# Patient Record
Sex: FEMALE | Race: BLACK OR AFRICAN AMERICAN | Employment: FULL TIME | URBAN - METROPOLITAN AREA
[De-identification: names, ages, dates, MRNs, and addresses within clinical notes are randomized per-mention and may not be internally consistent; named-entity substitution may affect disease eponyms.]

---

## 2017-07-14 ENCOUNTER — HOSPITAL ENCOUNTER (OUTPATIENT)
Dept: VASCULAR LAB | Age: 52
Discharge: HOME | End: 2017-07-14
Attending: EMERGENCY MEDICINE | Admitting: EMERGENCY MEDICINE

## 2017-07-15 PROBLEM — A41.9 SEPSIS (HCC): Status: ACTIVE | Noted: 2017-07-15

## 2018-09-13 ENCOUNTER — OFFICE VISIT (OUTPATIENT)
Dept: ORTHOPEDIC SURGERY | Age: 53
End: 2018-09-13

## 2018-09-13 VITALS
WEIGHT: 199 LBS | HEIGHT: 68 IN | SYSTOLIC BLOOD PRESSURE: 126 MMHG | DIASTOLIC BLOOD PRESSURE: 79 MMHG | HEART RATE: 65 BPM | RESPIRATION RATE: 16 BRPM | BODY MASS INDEX: 30.16 KG/M2 | TEMPERATURE: 98.2 F

## 2018-09-13 DIAGNOSIS — M25.561 ACUTE PAIN OF RIGHT KNEE: ICD-10-CM

## 2018-09-13 DIAGNOSIS — Z96.651 STATUS POST TOTAL RIGHT KNEE REPLACEMENT: Primary | ICD-10-CM

## 2018-09-13 PROCEDURE — 99213 OFFICE O/P EST LOW 20 MIN: CPT | Performed by: ORTHOPAEDIC SURGERY

## 2018-09-13 RX ORDER — METHYLPREDNISOLONE 4 MG/1
TABLET ORAL
Qty: 1 KIT | Refills: 0 | Status: SHIPPED | OUTPATIENT
Start: 2018-09-13 | End: 2018-09-19

## 2018-09-13 NOTE — PROGRESS NOTES
This dictation was done with [employer] dictation and may contain mechanical errors related to translation. Blood pressure 126/79, pulse 65, temperature 98.2 °F (36.8 °C), temperature source Temporal, resp. rate 16, height 5' 8\" (1.727 m), weight 199 lb (90.3 kg), not currently breastfeeding.

## 2018-09-17 NOTE — PROGRESS NOTES
Patient is a 48-year-old female who is now over 3 years postop right total knee arthroplasty. This patient had an uneventful postoperative recovery and at one point time states she was doing well without any significant issues. Recently she is complaining of increasing pain with weightbearing. She has no new history of injury. She has been using anti-inflammatories along with the oral narcotic agents for pain relief. She's here for further evaluation and follow-up. Patient Active Problem List   Diagnosis    Primary localized osteoarthrosis, lower leg    Status post total right knee replacement-8.19.2015-Dr. Marianna Santoyo    Edema    Excessive and frequent menstruation    Hyperlipidemia    Hypertrophy of uterus    Insomnia    Gonadoblastoma, female    Adynamic ileus ([facility] [address]    Other specified noninflammatory disorder of vagina    Sepsis ([facility] [address]     Prior to Visit Medications    Medication Sig Taking? Authorizing Provider   methylPREDNISolone (MEDROL, YONATHAN,) 4 MG tablet Pain, radicular Yes Cindy Monroy MD   ibuprofen (ADVIL;MOTRIN) 800 MG tablet Take 1 tablet by mouth every 8 hours as needed for Pain  Jeanie Nevarez PA-C   melatonin 3 MG TABS tablet Take 3 mg by mouth nightly  Historical Provider, MD   LORazepam (ATIVAN) 0.5 MG tablet Take 0.5 mg by mouth daily as needed for Anxiety  Historical Provider, MD   traMADol (ULTRAM) 50 MG tablet Take 1 tablet by mouth 2 times daily  Cindy Monroy MD   Compression Stockings MISC by Does not apply route Knee high 20-30 mmHg  Cindy Monroy MD   albuterol (VENTOLIN HFA) 108 (90 BASE) MCG/ACT inhaler Inhale 2 puffs into the lungs every 6 hours as needed for Wheezing  Historical Provider, MD     Physical examination 48-year-old female oriented ×3 temperature is 98.2. Examination of her back shows good range of motion no specific pain tenderness or instability.   Motor exam shows quadriceps hamstrings hip abductors and abductors foot plantar dorsiflexors are intact 4-5 over 5 to the right lower extremity. Sensation and perfusion are intact to the right foot and ankle. No other obvious cutaneous lesions lymphedema or cellulitic processes are noted in the right lower extremity. Examination of the right knee shows well-healed anterior scar full extension 130° of flexion with no obvious signs of instability or deep sepsis. Patient states that she has a knock knee deformity and on measuring of the eventual x-rays she had 5° of valgus measured on x-rays of her right total knee arthroplasty. X-rays obtained AP lateral patellofemoral view of the right knee show status post cemented right total knee arthroplasty. Stable overall orientation and alignment with no signs of acute failure or loosening. Good patellofemoral mechanics are noted and no other obvious fractures tumors or dislocations are seen on these x-rays. Impression 70-year-old female now over 3 years postop total knee arthroplasty. Patient is complaining of increasing pain with some swelling and at this point time we don't see any surgical indication. We would suggest continued conservative management. Plan we had a 15 minute face-to-face discussion with this patient of which greater than 50% time was spent on counseling her concerning her right knee arthroplasty. We suggest one a steroid Dosepak to see if we can't reduce some of the inflammation to physical therapy for strength increase in range of motion exercises. We did discuss with the patient her 5° of valgus that some seen both radiographically and on examination. We will follow her up in 4-6 weeks or p.r.n.

## 2018-11-26 ENCOUNTER — OFFICE VISIT (OUTPATIENT)
Dept: ORTHOPEDIC SURGERY | Age: 53
End: 2018-11-26
Payer: COMMERCIAL

## 2018-11-26 VITALS
BODY MASS INDEX: 30.01 KG/M2 | HEIGHT: 68 IN | DIASTOLIC BLOOD PRESSURE: 84 MMHG | HEART RATE: 77 BPM | WEIGHT: 198 LBS | SYSTOLIC BLOOD PRESSURE: 139 MMHG

## 2018-11-26 DIAGNOSIS — Z96.651 HISTORY OF TOTAL KNEE ARTHROPLASTY, RIGHT: Primary | ICD-10-CM

## 2018-11-26 PROCEDURE — 99203 OFFICE O/P NEW LOW 30 MIN: CPT | Performed by: ORTHOPAEDIC SURGERY

## 2019-05-08 ENCOUNTER — HOSPITAL ENCOUNTER (EMERGENCY)
Age: 54
Discharge: HOME | End: 2019-05-08
Payer: COMMERCIAL

## 2019-05-08 VITALS
HEART RATE: 69 BPM | OXYGEN SATURATION: 99 % | TEMPERATURE: 97.9 F | WEIGHT: 202.16 LBS | BODY MASS INDEX: 30.74 KG/M2 | SYSTOLIC BLOOD PRESSURE: 138 MMHG | DIASTOLIC BLOOD PRESSURE: 99 MMHG | RESPIRATION RATE: 16 BRPM

## 2019-05-08 DIAGNOSIS — S39.012A STRAIN OF LUMBAR REGION, INITIAL ENCOUNTER: Primary | ICD-10-CM

## 2019-05-08 PROCEDURE — 99283 EMERGENCY DEPT VISIT LOW MDM: CPT

## 2019-05-08 RX ORDER — IBUPROFEN 800 MG/1
800 TABLET ORAL EVERY 8 HOURS PRN
Qty: 20 TABLET | Refills: 0 | Status: SHIPPED | OUTPATIENT
Start: 2019-05-08 | End: 2019-09-09 | Stop reason: SDUPTHER

## 2019-05-08 RX ORDER — CYCLOBENZAPRINE HCL 10 MG
10 TABLET ORAL 3 TIMES DAILY PRN
Qty: 20 TABLET | Refills: 0 | Status: SHIPPED | OUTPATIENT
Start: 2019-05-08 | End: 2019-05-18

## 2019-05-08 ASSESSMENT — PAIN DESCRIPTION - PROGRESSION: CLINICAL_PROGRESSION: NOT CHANGED

## 2019-05-08 ASSESSMENT — PAIN DESCRIPTION - LOCATION: LOCATION: BACK

## 2019-05-08 ASSESSMENT — ENCOUNTER SYMPTOMS
NAUSEA: 0
BACK PAIN: 1

## 2019-05-08 ASSESSMENT — PAIN DESCRIPTION - FREQUENCY: FREQUENCY: CONTINUOUS

## 2019-05-08 ASSESSMENT — PAIN DESCRIPTION - ONSET: ONSET: GRADUAL

## 2019-05-08 ASSESSMENT — PAIN DESCRIPTION - ORIENTATION: ORIENTATION: LOWER

## 2019-05-08 ASSESSMENT — PAIN DESCRIPTION - DESCRIPTORS: DESCRIPTORS: ACHING

## 2019-05-08 ASSESSMENT — PAIN SCALES - GENERAL: PAINLEVEL_OUTOF10: 8

## 2019-05-08 ASSESSMENT — PAIN DESCRIPTION - PAIN TYPE: TYPE: ACUTE PAIN

## 2019-05-08 NOTE — LETTER
[facility] Emergency Department  [address] [place] [id number]  Phone: [phone]             May 8, 2019    Patient: Sudheer Hammond   YOB: 1965   Date of Visit: 5/8/2019       To Whom It May Concern:    Sudheer Hammond was seen and treated in our emergency department on 5/8/2019. She may return to work on 5/9/19.       Sincerely,             Signature:__________________________________

## 2019-05-09 NOTE — ED PROVIDER NOTES
[facility]  eMERGENCY dEPARTMENT eNCOUnter      Pt Name: Nancy Renteria  MRN: [id number]  [place] 1965  Date of evaluation: 5/8/2019  Provider: Marisol Brantley Dr       Chief Complaint   Patient presents with    Back Pain     low back pain \"spasms\" since yesterday         HISTORYOF PRESENT ILLNESS  (Location/Symptom, Timing/Onset, Context/Setting, Quality, Duration, Modifying Factors, Severity.)   Nancy Renteria is a 46 y.o. female who presents to the emergency department complaining of low back pain and spasming. Pain started yesterday after heavy lifting while at work. No direct trauma. Pain is constant and worse with movement. She applied ice, heat and icy hot with minimal relief. She rates pain 8 out of 10. It is nonradiating. No numbness, weakness, saddle anesthesia, incontinence or retention, fevers or chills or other symptoms. Nursing Notes were reviewedand I agree. REVIEW OF SYSTEMS    (2-9 systems for level 4, 10 or more forlevel 5)     Review of Systems   Constitutional: Negative for chills and fever. Gastrointestinal: Negative for nausea. Genitourinary: Negative for difficulty urinating. Musculoskeletal: Positive for back pain. Negative for arthralgias and neck pain. Skin: Negative for rash and wound. Neurological: Negative for weakness and numbness. All other systems reviewed and are negative. Except as noted above the remainder ofthe review of systems was reviewed and negative.        PAST MEDICALHISTORY         Diagnosis Date    Anxiety     Arthritis     Asthma        SURGICAL HISTORY           Procedure Laterality Date    HYSTERECTOMY  2012    KNEE ARTHROPLASTY Right 8-19-15    KNEE ARTHROSCOPY  2012    KNEE JOINT MANIPULATION Right 10/02/2015       CURRENT MEDICATIONS       Previous Medications    ALBUTEROL (VENTOLIN HFA) 108 (90 BASE) MCG/ACT INHALER    Inhale 2 puffs into the lungs every 6 hours as needed for Wheezing    COMPRESSION STOCKINGS MISC    by Does not apply route Knee high 20-30 mmHg    HOMEOPATHIC PRODUCTS (SPEEDGEL) GEL    Apply 1 inch topically daily    HOMEOPATHIC PRODUCTS (SPEEDGEL) GEL    Apply 1 inch topically daily    LORAZEPAM (ATIVAN) 0.5 MG TABLET    Take 0.5 mg by mouth daily as needed for Anxiety    MELATONIN 3 MG TABS TABLET    Take 3 mg by mouth nightly    TRAMADOL (ULTRAM) 50 MG TABLET    Take 1 tablet by mouth 2 times daily       ALLERGIES     Garlic; Penicillins; Demerol hcl [meperidine]; and Erythromycin    FAMILY HISTORY     History reviewed. No pertinent family history. No family status information on file. SOCIAL HISTORY    reports that she has never smoked. She has never used smokeless tobacco. She reports that she does not drink alcohol or use drugs. PHYSICAL EXAM    (up to 7 for level 4, 8 or more for level 5)     ED Triage Vitals   BP Temp Temp src Pulse Resp SpO2 Height Weight   05/08/19 2107 05/08/19 2107 -- 05/08/19 2107 05/08/19 2107 05/08/19 2107 -- 05/08/19 2106   (!) 138/99 97.9 °F (36.6 °C)  69 16 99 %  202 lb 2.6 oz (91.7 kg)       Physical Exam   Constitutional: She is oriented to person, place, and time. She appears well-developed and well-nourished. No distress. HENT:   Head: Normocephalic and atraumatic. Neck: Neck supple. Pulmonary/Chest: Effort normal. No respiratory distress. Musculoskeletal: Normal range of motion. Mild paraspinous musculature tenderness without midline tenderness bilateral lumbar spine   Neurological: She is alert and oriented to person, place, and time. Equal strength and sensation bilateral lower extremities   Skin: Skin is warm and dry. Psychiatric: She has a normal mood and affect. Her behavior is normal.   Nursing note and vitals reviewed.          EMERGENCY DEPARTMENT COURSE and DIFFERENTIAL DIAGNOSIS/MDM:   Vitals:    Vitals:    05/08/19 2106 05/08/19 2107   BP:  (!) 138/99   Pulse:  69   Resp:  16   Temp: toedit the dictations but occasionally words are mis-transcribed.)    JULIA Ramirez PA-C  05/08/19 2124

## 2019-08-08 ENCOUNTER — APPOINTMENT (OUTPATIENT)
Dept: GENERAL RADIOLOGY | Age: 54
End: 2019-08-08
Payer: COMMERCIAL

## 2019-08-08 ENCOUNTER — HOSPITAL ENCOUNTER (EMERGENCY)
Age: 54
Discharge: HOME | End: 2019-08-08
Payer: COMMERCIAL

## 2019-08-08 VITALS
DIASTOLIC BLOOD PRESSURE: 88 MMHG | HEIGHT: 68 IN | WEIGHT: 209.66 LBS | OXYGEN SATURATION: 99 % | HEART RATE: 60 BPM | BODY MASS INDEX: 31.78 KG/M2 | SYSTOLIC BLOOD PRESSURE: 145 MMHG | TEMPERATURE: 98.5 F | RESPIRATION RATE: 14 BRPM

## 2019-08-08 DIAGNOSIS — R07.9 CHEST PAIN, UNSPECIFIED TYPE: Primary | ICD-10-CM

## 2019-08-08 DIAGNOSIS — F41.1 ANXIETY STATE: ICD-10-CM

## 2019-08-08 LAB
A/G RATIO: 1.3 (ref 1.1–2.2)
ALBUMIN SERPL-MCNC: 4 G/DL (ref 3.4–5)
ALP BLD-CCNC: 67 U/L (ref 40–129)
ALT SERPL-CCNC: 12 U/L (ref 10–40)
ANION GAP SERPL CALCULATED.3IONS-SCNC: 12 MMOL/L (ref 3–16)
AST SERPL-CCNC: 15 U/L (ref 15–37)
BASOPHILS ABSOLUTE: 0 K/UL (ref 0–0.2)
BASOPHILS RELATIVE PERCENT: 0.7 %
BILIRUB SERPL-MCNC: <0.2 MG/DL (ref 0–1)
BUN BLDV-MCNC: 11 MG/DL (ref 7–20)
CALCIUM SERPL-MCNC: 9.5 MG/DL (ref 8.3–10.6)
CHLORIDE BLD-SCNC: 103 MMOL/L (ref 99–110)
CO2: 24 MMOL/L (ref 21–32)
CREAT SERPL-MCNC: 0.7 MG/DL (ref 0.6–1.1)
EKG ATRIAL RATE: 64 BPM
EKG DIAGNOSIS: NORMAL
EKG P AXIS: 36 DEGREES
EKG P-R INTERVAL: 144 MS
EKG Q-T INTERVAL: 394 MS
EKG QRS DURATION: 78 MS
EKG QTC CALCULATION (BAZETT): 406 MS
EKG R AXIS: 13 DEGREES
EKG T AXIS: 39 DEGREES
EKG VENTRICULAR RATE: 64 BPM
EOSINOPHILS ABSOLUTE: 0.2 K/UL (ref 0–0.6)
EOSINOPHILS RELATIVE PERCENT: 2.6 %
GFR AFRICAN AMERICAN: >60
GFR NON-AFRICAN AMERICAN: >60
GLOBULIN: 3.2 G/DL
GLUCOSE BLD-MCNC: 94 MG/DL (ref 70–99)
HCT VFR BLD CALC: 38.5 % (ref 36–48)
HEMOGLOBIN: 12.4 G/DL (ref 12–16)
LYMPHOCYTES ABSOLUTE: 2.5 K/UL (ref 1–5.1)
LYMPHOCYTES RELATIVE PERCENT: 40.6 %
MCH RBC QN AUTO: 26.7 PG (ref 26–34)
MCHC RBC AUTO-ENTMCNC: 32.3 G/DL (ref 31–36)
MCV RBC AUTO: 82.8 FL (ref 80–100)
MONOCYTES ABSOLUTE: 0.4 K/UL (ref 0–1.3)
MONOCYTES RELATIVE PERCENT: 6.7 %
NEUTROPHILS ABSOLUTE: 3 K/UL (ref 1.7–7.7)
NEUTROPHILS RELATIVE PERCENT: 49.4 %
PDW BLD-RTO: 14.4 % (ref 12.4–15.4)
PLATELET # BLD: 282 K/UL (ref 135–450)
PMV BLD AUTO: 7.8 FL (ref 5–10.5)
POTASSIUM SERPL-SCNC: 4.6 MMOL/L (ref 3.5–5.1)
RBC # BLD: 4.65 M/UL (ref 4–5.2)
SODIUM BLD-SCNC: 139 MMOL/L (ref 136–145)
TOTAL PROTEIN: 7.2 G/DL (ref 6.4–8.2)
TROPONIN: <0.01 NG/ML
WBC # BLD: 6.1 K/UL (ref 4–11)

## 2019-08-08 PROCEDURE — 99285 EMERGENCY DEPT VISIT HI MDM: CPT

## 2019-08-08 PROCEDURE — 80053 COMPREHEN METABOLIC PANEL: CPT

## 2019-08-08 PROCEDURE — 71046 X-RAY EXAM CHEST 2 VIEWS: CPT

## 2019-08-08 PROCEDURE — 84484 ASSAY OF TROPONIN QUANT: CPT

## 2019-08-08 PROCEDURE — 85025 COMPLETE CBC W/AUTO DIFF WBC: CPT

## 2019-08-08 PROCEDURE — 93005 ELECTROCARDIOGRAM TRACING: CPT | Performed by: PHYSICIAN ASSISTANT

## 2019-08-08 PROCEDURE — 93010 ELECTROCARDIOGRAM REPORT: CPT | Performed by: INTERNAL MEDICINE

## 2019-08-08 RX ORDER — HYDROXYZINE PAMOATE 25 MG/1
25-50 CAPSULE ORAL 3 TIMES DAILY PRN
Qty: 30 CAPSULE | Refills: 0 | Status: SHIPPED | OUTPATIENT
Start: 2019-08-08 | End: 2019-09-09

## 2019-08-08 ASSESSMENT — ENCOUNTER SYMPTOMS
VOMITING: 0
ABDOMINAL PAIN: 0
SHORTNESS OF BREATH: 0
NAUSEA: 0
CHOKING: 0
BACK PAIN: 0
FACIAL SWELLING: 0
EYE DISCHARGE: 0
EYE REDNESS: 0
APNEA: 0
SORE THROAT: 0

## 2019-08-08 ASSESSMENT — PAIN DESCRIPTION - PROGRESSION: CLINICAL_PROGRESSION: NOT CHANGED

## 2019-08-08 ASSESSMENT — HEART SCORE: ECG: 0

## 2019-08-08 ASSESSMENT — PAIN DESCRIPTION - PAIN TYPE: TYPE: ACUTE PAIN

## 2019-08-08 ASSESSMENT — PAIN DESCRIPTION - ORIENTATION: ORIENTATION: LEFT

## 2019-08-08 ASSESSMENT — PAIN DESCRIPTION - ONSET: ONSET: ON-GOING

## 2019-08-08 ASSESSMENT — PAIN DESCRIPTION - LOCATION: LOCATION: CHEST

## 2019-08-08 ASSESSMENT — PAIN DESCRIPTION - DESCRIPTORS: DESCRIPTORS: DULL

## 2019-08-08 ASSESSMENT — PAIN DESCRIPTION - FREQUENCY: FREQUENCY: INTERMITTENT

## 2019-08-08 ASSESSMENT — PAIN SCALES - GENERAL: PAINLEVEL_OUTOF10: 3

## 2019-08-08 NOTE — ED PROVIDER NOTES
**EVALUATED BY ADVANCED PRACTICE PROVIDER**        [address]      Pt Name: Shaun Richter  PEC:[id number]  [place] 1965  Date of evaluation: 8/8/2019  Provider: Adrien Mata PA-C      Chief Complaint:    Chief Complaint   Patient presents with    Chest Pain     started 3 weeks ago, pain comes and goes       Nursing Notes, Past Medical Hx, Past Surgical Hx, Social Hx, Allergies, and Family Hx were all reviewed and agreed with or any disagreements were addressed in the HPI.    HPI:  (Location, Duration, Timing, Severity,Quality, Assoc Sx, Context, Modifying factors)  This is a  46 y.o. female complain of chest pain for last 3 weeks. Says it comes and go. And she thinks is more related to anxiety from the stress she is been having at work. She denies diaphoresis, no abdominal pain, no radiation of pain down her arms or up in her neck. Pain does not radiate to her back. More to the upper left side chest wall. Says sometimes it feels sharp but dull. And she feels somewhat anxious. She says she has had this in the past and she is been on Lorazepam.  She denies nausea vomiting. No previous history of heart disease. Denies history of hypertension, diabetes, hyperlipidemia. Denies weakness. No other complaints.       PastMedical/Surgical History:      Diagnosis Date    Anxiety     Arthritis     Asthma          Procedure Laterality Date    HYSTERECTOMY  2012    KNEE ARTHROPLASTY Right 8-19-15    KNEE ARTHROSCOPY  2012    KNEE JOINT MANIPULATION Right 10/02/2015       Medications:  Previous Medications    ALBUTEROL (VENTOLIN HFA) 108 (90 BASE) MCG/ACT INHALER    Inhale 2 puffs into the lungs every 6 hours as needed for Wheezing    COMPRESSION STOCKINGS MISC    by Does not apply route Knee high 20-30 mmHg    HOMEOPATHIC PRODUCTS (SPEEDGEL) GEL    Apply 1 inch topically daily    HOMEOPATHIC PRODUCTS (SPEEDGEL) GEL    Apply 1 inch topically daily    IBUPROFEN (ADVIL;MOTRIN) 800 MG TABLET    Take 1 tablet by mouth every 8 hours as needed for Pain    LORAZEPAM (ATIVAN) 0.5 MG TABLET    Take 0.5 mg by mouth daily as needed for Anxiety    MELATONIN 3 MG TABS TABLET    Take 3 mg by mouth nightly    TRAMADOL (ULTRAM) 50 MG TABLET    Take 1 tablet by mouth 2 times daily         Review of Systems:  Review of Systems   Constitutional: Negative for chills and fever. HENT: Negative for congestion, facial swelling and sore throat. Eyes: Negative for discharge and redness. Respiratory: Negative for apnea, choking and shortness of breath. Cardiovascular: Positive for chest pain. Gastrointestinal: Negative for abdominal pain, nausea and vomiting. Genitourinary: Negative for dysuria. Musculoskeletal: Negative for back pain, neck pain and neck stiffness. Neurological: Negative for dizziness, tremors, seizures, weakness and headaches. Psychiatric/Behavioral: The patient is nervous/anxious. All other systems reviewed and are negative. Positives and Pertinent negatives as per HPI. Except as noted above in the ROS, problem specific ROS was completed and is negative. Physical Exam:  Physical Exam   Constitutional: She is oriented to person, place, and time. She appears well-developed and well-nourished. HENT:   Head: Normocephalic and atraumatic. Nose: Nose normal.   Eyes: Pupils are equal, round, and reactive to light. EOM are normal. Right eye exhibits no discharge. Left eye exhibits no discharge. Neck: Normal range of motion. Neck supple. Cardiovascular: Normal rate, regular rhythm and normal heart sounds. Exam reveals no gallop and no friction rub. No murmur heard. Pulmonary/Chest: Effort normal and breath sounds normal. No respiratory distress. She has no wheezes. She has no rales. She exhibits no tenderness. Abdominal: Soft. Bowel sounds are normal. She exhibits no distension and no mass. There is no tenderness. tenderness reproduced with palpation. Abdomen is soft nontender. No rebound or guarding noted. Normal bowel sounds all 4 quadrants. Bilateral lower extremities show no edema. Neurologically patient has no motor or sensory deficit noted. No focal deficits. She is alert and oriented x4. Does not appear to be in acute distress. Patient heart score is 1. Lab results from today CBC within normal limits with a white count 6.1. CMP sodium 139, potassium 4.6, chloride 103 with a BUN of 11 and creatinine 0.7. Normal transaminases. Troponin less than 0.01      X-ray chest on no acute abnormalities identified. At this time I did discuss patient discharge plan. I did not repeated out to try because his chest pain been off and on for 3 weeks now. If this was cardiac related I do believe it would have been elevated. She has a heart score 1. No risk factors. I discussed with her this could be her anxiety. I will recommend she follow back up with her primary care physician. Return for any worsening. Patient was okay with this plan. The patient tolerated their visit well. I evaluated the patient. The physician was available for consultation as needed. The patient and / or the family were informed of the results of anytests, a time was given to answer questions, a plan was proposed and they agreed with plan. CLINICAL IMPRESSION:  1. Chest pain, unspecified type    2.  Anxiety state        DISPOSITION  DISPOSITION Decision To Discharge 08/08/2019 03:50:19 PM          PATIENT REFERRED TO:  Macie Dewitt  [address] [address] [phone]    Call in 1 day        DISCHARGE MEDICATIONS:  New Prescriptions    HYDROXYZINE (VISTARIL) 25 MG CAPSULE    Take 1-2 capsules by mouth 3 times daily as needed for Anxiety       DISCONTINUED MEDICATIONS:  Discontinued Medications    No medications on file              (Please note the MDM and HPI

## 2019-08-20 ENCOUNTER — HOSPITAL ENCOUNTER (EMERGENCY)
Age: 54
Discharge: HOME | End: 2019-08-20
Payer: COMMERCIAL

## 2019-08-20 VITALS
OXYGEN SATURATION: 100 % | RESPIRATION RATE: 16 BRPM | SYSTOLIC BLOOD PRESSURE: 147 MMHG | TEMPERATURE: 98.9 F | HEART RATE: 58 BPM | DIASTOLIC BLOOD PRESSURE: 82 MMHG

## 2019-08-20 DIAGNOSIS — M25.561 ACUTE PAIN OF RIGHT KNEE: Primary | ICD-10-CM

## 2019-08-20 PROCEDURE — 99283 EMERGENCY DEPT VISIT LOW MDM: CPT

## 2019-08-20 ASSESSMENT — PAIN DESCRIPTION - PAIN TYPE
TYPE: CHRONIC PAIN
TYPE: ACUTE PAIN

## 2019-08-20 ASSESSMENT — PAIN DESCRIPTION - FREQUENCY
FREQUENCY: CONTINUOUS
FREQUENCY: CONTINUOUS

## 2019-08-20 ASSESSMENT — PAIN DESCRIPTION - PROGRESSION
CLINICAL_PROGRESSION: NOT CHANGED
CLINICAL_PROGRESSION: NOT CHANGED

## 2019-08-20 ASSESSMENT — PAIN DESCRIPTION - ORIENTATION
ORIENTATION: RIGHT
ORIENTATION: RIGHT

## 2019-08-20 ASSESSMENT — PAIN - FUNCTIONAL ASSESSMENT
PAIN_FUNCTIONAL_ASSESSMENT: 0-10
PAIN_FUNCTIONAL_ASSESSMENT: ACTIVITIES ARE NOT PREVENTED

## 2019-08-20 ASSESSMENT — PAIN DESCRIPTION - DESCRIPTORS
DESCRIPTORS: ACHING;CONSTANT
DESCRIPTORS: ACHING;CONSTANT

## 2019-08-20 ASSESSMENT — PAIN DESCRIPTION - LOCATION
LOCATION: KNEE
LOCATION: KNEE

## 2019-08-20 ASSESSMENT — PAIN SCALES - GENERAL
PAINLEVEL_OUTOF10: 10
PAINLEVEL_OUTOF10: 10

## 2019-08-20 ASSESSMENT — PAIN DESCRIPTION - ONSET
ONSET: ON-GOING
ONSET: ON-GOING

## 2019-08-20 NOTE — ED PROVIDER NOTES
[address]  [address] [address]  Dept: [phone]  Loc: [address] ENCOUNTER        This patient was not seen or evaluated by the attending physician. I evaluated this patient, the attending physician was available for consultation. CHIEF COMPLAINT    Chief Complaint   Patient presents with    Knee Pain     chronic knee pain  flaired up 3 days ago        HPI    Cherry aTvarez is a 46 y.o. female with history of chronic right knee pain who presents with right knee pain. Onset was 3 days ago. The duration has been constant since the onset. The context is that the patient is on her feet for 8 hours a day. The severity of the pain is 10/10. The pain worsens with ambulation. Patient was recently given prescription for Tramadol from her PCP, she has been taking this with minimal relief.     REVIEW OF SYSTEMS    General: No fever or chills  Skin: No Rashes or redness of the skin  Musculoskeletal: See HPI    PAST MEDICAL & SURGICAL HISTORY    Past Medical History:   Diagnosis Date    Anxiety     Arthritis     Asthma      Past Surgical History:   Procedure Laterality Date    HYSTERECTOMY  2012    KNEE ARTHROPLASTY Right 8-19-15    KNEE ARTHROSCOPY  2012    KNEE JOINT MANIPULATION Right 10/02/2015       CURRENT MEDICATIONS  (may include discharge medications prescribed in the ED)  Current Outpatient Rx   Medication Sig Dispense Refill    hydrOXYzine (VISTARIL) 25 MG capsule Take 1-2 capsules by mouth 3 times daily as needed for Anxiety 30 capsule 0    ibuprofen (ADVIL;MOTRIN) 800 MG tablet Take 1 tablet by mouth every 8 hours as needed for Pain 20 tablet 0    Homeopathic Products (SPEEDGEL) GEL Apply 1 inch topically daily 1 Tube 2    Homeopathic Products (SPEEDGEL) GEL Apply 1 inch topically daily 1 Tube 2    melatonin 3 MG TABS tablet Take 3 mg by mouth nightly      LORazepam Pulse 58   Temp 98.9 °F (37.2 °C) (Oral)   Resp 17   SpO2 100%   Constitutional:  Well developed, no acute distress  HENT:  Atraumatic, Moist mucous membranes  Neck: normal range of motion, supple   Respiratory:  No retractions   Cardiovascular:  No JVD   Musculoskeletal:  Exam of the affected knee reveals no tenderness to palpation, no edema or deformity. Extensor mechanism is intact (Patient is able to extend the leg against gravity, demonstrating that the quadriceps tendon and the patella tendon are intact.)  Vascular: DP pulses 2+ on the same side as the knee pain (distal to the affected knee). Integument:  Well hydrated, no erythema or warmth of the affected knee  Neurologic:  Awake and alert, no slurred speech   Psychiatric: Cooperative, pleasant affect    RADIOLOGY/PROCEDURES    Patient declined plain films. ED COURSE & MEDICAL DECISION MAKING    Pertinent Imaging studies reviewed and interpreted. (See EMR for details)  See electronic record for medications ordered. Differential diagnosis: includes but not limited to Meniscus tear, ACL tear, tibial plateau fracture, extensor mechanism rupture, dislocation, Arterial Injury/Ischemia, Infection, Neurologic Deficit/Injury. No evidence of neurovascular injury on exam.  Patient declined plain films. I explained to the patient that there may be ligamentous/meniscal injury not seen on plain films, and that they will require follow-up with orthopedics for further evaluation. She has seen Dr. Nita Maria in the past, but is requesting referral to a different orthopedist.    The patient was instructed to follow up as an outpatient in 3 days. The patient was instructed to return to the ED immediately for any new or worsening symptoms. The patient verbalized understanding. FINAL IMPRESSION    1.  Acute pain of right knee        PLAN  Discharge with close outpatient follow-up (see EMR)     (Please note that this note was completed with a voice recognition

## 2019-08-22 ENCOUNTER — OFFICE VISIT (OUTPATIENT)
Dept: ORTHOPEDIC SURGERY | Age: 54
End: 2019-08-22
Payer: COMMERCIAL

## 2019-08-22 VITALS
HEART RATE: 65 BPM | DIASTOLIC BLOOD PRESSURE: 77 MMHG | SYSTOLIC BLOOD PRESSURE: 123 MMHG | HEIGHT: 68 IN | BODY MASS INDEX: 31.4 KG/M2 | WEIGHT: 207.2 LBS | RESPIRATION RATE: 16 BRPM

## 2019-08-22 DIAGNOSIS — T84.84XA PAIN DUE TO TOTAL RIGHT KNEE REPLACEMENT, INITIAL ENCOUNTER (HCC): Primary | ICD-10-CM

## 2019-08-22 DIAGNOSIS — Z96.651 PAIN DUE TO TOTAL RIGHT KNEE REPLACEMENT, INITIAL ENCOUNTER (HCC): Primary | ICD-10-CM

## 2019-08-22 PROCEDURE — 99214 OFFICE O/P EST MOD 30 MIN: CPT | Performed by: ORTHOPAEDIC SURGERY

## 2019-08-22 NOTE — PROGRESS NOTES
NEW PATIENT ORTHOPAEDIC NOTE    Chief Complaint   Patient presents with    Knee Pain     right knee pain        HPI  46 y.o. female seen for evaluation of chronic right knee pain    Hx of R TKA with Dr Wang Panchal on 8/19/2015  Underwent right knee manipulation under anesthesia on 10/2/2015 for arthrofibrosis  She reports the knee never really improved after her surgery, has chronic pain in it  Denies wound healing problems postop  She takes tramadol chronically for this issue, prescribed by Dr Tiffany Almonte PCP  Las saw Dr Wang Panchal in Sept 2018  Then saw Dr Davey Guillen in November 2018, who recommended workup, patient declined    Onset chronic  Injury/trauma none  Pain is located diffuse, but mainly inferior and anterior  Worse with activity, pain is constant she reports  Better with tramadol  Associated with N/A        Review of Systems  Constitutional - denies fevers; + weight change  Cardiovascular - denies chest pain, palpitations, peripheral edema, blood clots  Respiratory - denies SOB, cough  Gastrointestinal - denies abdominal pain, nausea, vomiting  Genitourinary - denies dysuria, discharge  Musculoskeletal - per HPI  Integumentary - denies rash, sores  Neurologic - denies numbness, tingling, paresthesias  Hematologic - denies abnormal bleeding, blood clots  Allergic/Immunologic - denies metal allergies, recurrent infections    Allergies   Allergen Reactions    Garlic      Respiratory Distress    Penicillins Nausea Only     weak    Demerol Hcl [Meperidine] Nausea And Vomiting and Itching     weak    Erythromycin Nausea Only and Nausea And Vomiting        Current Outpatient Medications   Medication Sig Dispense Refill    hydrOXYzine (VISTARIL) 25 MG capsule Take 1-2 capsules by mouth 3 times daily as needed for Anxiety 30 capsule 0    ibuprofen (ADVIL;MOTRIN) 800 MG tablet Take 1 tablet by mouth every 8 hours as needed for Pain 20 tablet 0    Homeopathic Products (SPEEDGEL) GEL Apply 1 inch topically daily 1 Tube 2

## 2019-09-09 ENCOUNTER — OFFICE VISIT (OUTPATIENT)
Dept: PRIMARY CARE CLINIC | Age: 54
End: 2019-09-09
Payer: COMMERCIAL

## 2019-09-09 VITALS
SYSTOLIC BLOOD PRESSURE: 118 MMHG | BODY MASS INDEX: 31.67 KG/M2 | OXYGEN SATURATION: 99 % | HEIGHT: 68 IN | TEMPERATURE: 98.4 F | RESPIRATION RATE: 18 BRPM | WEIGHT: 209 LBS | DIASTOLIC BLOOD PRESSURE: 71 MMHG | HEART RATE: 60 BPM

## 2019-09-09 DIAGNOSIS — M25.561 CHRONIC PAIN OF RIGHT KNEE: ICD-10-CM

## 2019-09-09 DIAGNOSIS — F41.9 ANXIETY: Primary | ICD-10-CM

## 2019-09-09 DIAGNOSIS — M17.11 PRIMARY LOCALIZED OSTEOARTHROSIS OF RIGHT LOWER LEG: ICD-10-CM

## 2019-09-09 DIAGNOSIS — E78.5 HYPERLIPIDEMIA, UNSPECIFIED HYPERLIPIDEMIA TYPE: ICD-10-CM

## 2019-09-09 DIAGNOSIS — J45.20 MILD INTERMITTENT ASTHMA WITHOUT COMPLICATION: ICD-10-CM

## 2019-09-09 DIAGNOSIS — Z96.651 STATUS POST TOTAL RIGHT KNEE REPLACEMENT: ICD-10-CM

## 2019-09-09 DIAGNOSIS — G89.29 CHRONIC PAIN OF RIGHT KNEE: ICD-10-CM

## 2019-09-09 DIAGNOSIS — Z12.11 SCREENING FOR MALIGNANT NEOPLASM OF COLON: ICD-10-CM

## 2019-09-09 DIAGNOSIS — F51.01 PRIMARY INSOMNIA: ICD-10-CM

## 2019-09-09 PROBLEM — A41.9 SEPSIS (HCC): Status: RESOLVED | Noted: 2017-07-15 | Resolved: 2019-09-09

## 2019-09-09 PROCEDURE — 99203 OFFICE O/P NEW LOW 30 MIN: CPT | Performed by: FAMILY MEDICINE

## 2019-09-09 RX ORDER — LORAZEPAM 0.5 MG/1
0.5 TABLET ORAL DAILY PRN
Qty: 30 TABLET | Refills: 0 | Status: SHIPPED | OUTPATIENT
Start: 2019-09-09 | End: 2019-10-09

## 2019-09-09 RX ORDER — IBUPROFEN 800 MG/1
800 TABLET ORAL EVERY 8 HOURS PRN
Qty: 120 TABLET | Refills: 2 | Status: SHIPPED | OUTPATIENT
Start: 2019-09-09 | End: 2020-07-27 | Stop reason: ALTCHOICE

## 2019-09-09 ASSESSMENT — PATIENT HEALTH QUESTIONNAIRE - PHQ9
2. FEELING DOWN, DEPRESSED OR HOPELESS: 0
SUM OF ALL RESPONSES TO PHQ QUESTIONS 1-9: 0
SUM OF ALL RESPONSES TO PHQ9 QUESTIONS 1 & 2: 0
SUM OF ALL RESPONSES TO PHQ QUESTIONS 1-9: 0
DEPRESSION UNABLE TO ASSESS: FUNCTIONAL CAPACITY MOTIVATION LIMITS ACCURACY
1. LITTLE INTEREST OR PLEASURE IN DOING THINGS: 0

## 2019-09-09 NOTE — PATIENT INSTRUCTIONS
Incorporated. Care instructions adapted under license by [facility] ([facility]). If you have questions about a medical condition or this instruction, always ask your healthcare professional. [address] any warranty or liability for your use of this information. Patient Education        Colon Cancer Screening: Care Instructions  Your Care Instructions    Colorectal cancer occurs in the colon or rectum. That's the lower part of your digestive system. It is the second-leading cause of cancer deaths in the [place]. It often starts with small growths called polyps in the colon or rectum. Polyps are usually found with screening tests. Depending on the type of test, any polyps found may be removed during the tests. Colorectal cancer usually does not cause symptoms at first. But regular tests can help find it early, before it spreads and becomes harder to treat. Your risk for colorectal cancer gets higher as you get older. Some experts say that adults should start regular screening at age 48 and stop at age 76. Others say to start before age 48 or continue after age 76. Talk with your doctor about your risk and when to start and stop screening. You may have one of several tests. Follow-up care is a key part of your treatment and safety. Be sure to make and go to all appointments, and call your doctor if you are having problems. It's also a good idea to know your test results and keep a list of the medicines you take. What are the main screening tests for colon cancer? · Stool tests. These include the fecal immunochemical test (FIT) and the fecal occult blood test (FOBT). These tests check stool samples for signs of cancer. If your test is positive, you will need to have a colonoscopy. · Sigmoidoscopy. This test lets your doctor look at the lining of your rectum and the lowest part of your colon. Your doctor uses a lighted tube called a sigmoidoscope.  This test can't find cancers or polyps

## 2019-10-04 ENCOUNTER — TELEPHONE (OUTPATIENT)
Dept: PRIMARY CARE CLINIC | Age: 54
End: 2019-10-04

## 2019-10-04 DIAGNOSIS — J45.20 MILD INTERMITTENT ASTHMA WITHOUT COMPLICATION: Primary | ICD-10-CM

## 2019-10-04 RX ORDER — ALBUTEROL SULFATE 90 UG/1
2 AEROSOL, METERED RESPIRATORY (INHALATION) EVERY 6 HOURS PRN
Qty: 1 INHALER | Refills: 2 | Status: SHIPPED | OUTPATIENT
Start: 2019-10-04 | End: 2020-07-09 | Stop reason: SDUPTHER

## 2019-10-08 ENCOUNTER — TELEPHONE (OUTPATIENT)
Dept: PRIMARY CARE CLINIC | Age: 54
End: 2019-10-08

## 2019-10-09 ENCOUNTER — NURSE TRIAGE (OUTPATIENT)
Dept: OTHER | Facility: CLINIC | Age: 54
End: 2019-10-09

## 2019-10-10 ENCOUNTER — OFFICE VISIT (OUTPATIENT)
Dept: PRIMARY CARE CLINIC | Age: 54
End: 2019-10-10
Payer: COMMERCIAL

## 2019-10-10 VITALS
BODY MASS INDEX: 31.83 KG/M2 | DIASTOLIC BLOOD PRESSURE: 82 MMHG | WEIGHT: 210 LBS | HEIGHT: 68 IN | SYSTOLIC BLOOD PRESSURE: 150 MMHG | TEMPERATURE: 97.4 F | HEART RATE: 77 BPM | OXYGEN SATURATION: 98 %

## 2019-10-10 DIAGNOSIS — Z96.651 STATUS POST TOTAL RIGHT KNEE REPLACEMENT: ICD-10-CM

## 2019-10-10 DIAGNOSIS — M17.11 PRIMARY LOCALIZED OSTEOARTHROSIS OF RIGHT LOWER LEG: ICD-10-CM

## 2019-10-10 DIAGNOSIS — J45.20 MILD INTERMITTENT ASTHMA WITHOUT COMPLICATION: Primary | ICD-10-CM

## 2019-10-10 PROCEDURE — 99214 OFFICE O/P EST MOD 30 MIN: CPT | Performed by: INTERNAL MEDICINE

## 2019-10-10 RX ORDER — TRAMADOL HYDROCHLORIDE 50 MG/1
50 TABLET ORAL 2 TIMES DAILY
Qty: 60 TABLET | Refills: 0 | Status: SHIPPED | OUTPATIENT
Start: 2019-10-10 | End: 2019-11-09

## 2019-10-10 ASSESSMENT — ENCOUNTER SYMPTOMS
EYES NEGATIVE: 1
GASTROINTESTINAL NEGATIVE: 1
COUGH: 1

## 2019-11-22 ENCOUNTER — OFFICE VISIT (OUTPATIENT)
Dept: INTERNAL MEDICINE CLINIC | Age: 54
End: 2019-11-22
Payer: COMMERCIAL

## 2019-11-22 VITALS
WEIGHT: 212.2 LBS | HEART RATE: 78 BPM | OXYGEN SATURATION: 97 % | DIASTOLIC BLOOD PRESSURE: 76 MMHG | SYSTOLIC BLOOD PRESSURE: 117 MMHG | BODY MASS INDEX: 32.16 KG/M2 | HEIGHT: 68 IN

## 2019-11-22 DIAGNOSIS — M17.11 PRIMARY LOCALIZED OSTEOARTHROSIS OF RIGHT LOWER LEG: Primary | ICD-10-CM

## 2019-11-22 DIAGNOSIS — Z12.11 SCREEN FOR COLON CANCER: ICD-10-CM

## 2019-11-22 PROCEDURE — 99213 OFFICE O/P EST LOW 20 MIN: CPT | Performed by: NURSE PRACTITIONER

## 2019-11-22 RX ORDER — TRAMADOL HYDROCHLORIDE 50 MG/1
50 TABLET ORAL 2 TIMES DAILY
COMMUNITY
Start: 2019-10-09 | End: 2020-01-24 | Stop reason: SDUPTHER

## 2019-11-22 SDOH — SOCIAL STABILITY: SOCIAL INSECURITY: WITHIN THE LAST YEAR, HAVE YOU BEEN AFRAID OF YOUR PARTNER OR EX-PARTNER?: NO

## 2019-11-22 SDOH — SOCIAL STABILITY: SOCIAL INSECURITY: WITHIN THE LAST YEAR, HAVE YOU BEEN HUMILIATED OR EMOTIONALLY ABUSED IN OTHER WAYS BY YOUR PARTNER OR EX-PARTNER?: NO

## 2019-11-22 SDOH — SOCIAL STABILITY: SOCIAL INSECURITY
WITHIN THE LAST YEAR, HAVE TO BEEN RAPED OR FORCED TO HAVE ANY KIND OF SEXUAL ACTIVITY BY YOUR PARTNER OR EX-PARTNER?: NO

## 2019-11-22 SDOH — SOCIAL STABILITY: SOCIAL INSECURITY
WITHIN THE LAST YEAR, HAVE YOU BEEN KICKED, HIT, SLAPPED, OR OTHERWISE PHYSICALLY HURT BY YOUR PARTNER OR EX-PARTNER?: NO

## 2019-11-22 ASSESSMENT — ENCOUNTER SYMPTOMS
DIFFICULTY BREATHING: 1
SHORTNESS OF BREATH: 1

## 2019-11-26 ENCOUNTER — TELEPHONE (OUTPATIENT)
Dept: PAIN MANAGEMENT | Age: 54
End: 2019-11-26

## 2019-12-18 ENCOUNTER — HOSPITAL ENCOUNTER (OUTPATIENT)
Dept: MAMMOGRAPHY | Age: 54
Discharge: HOME | End: 2019-12-23
Payer: COMMERCIAL

## 2019-12-18 DIAGNOSIS — Z12.31 VISIT FOR SCREENING MAMMOGRAM: ICD-10-CM

## 2019-12-18 PROCEDURE — 77063 BREAST TOMOSYNTHESIS BI: CPT

## 2019-12-20 ENCOUNTER — OFFICE VISIT (OUTPATIENT)
Dept: INTERNAL MEDICINE CLINIC | Age: 54
End: 2019-12-20
Payer: COMMERCIAL

## 2019-12-20 VITALS
HEIGHT: 68 IN | HEART RATE: 55 BPM | OXYGEN SATURATION: 97 % | BODY MASS INDEX: 32.25 KG/M2 | DIASTOLIC BLOOD PRESSURE: 82 MMHG | SYSTOLIC BLOOD PRESSURE: 132 MMHG | WEIGHT: 212.8 LBS

## 2019-12-20 DIAGNOSIS — Z13.1 SCREENING FOR DIABETES MELLITUS: Primary | ICD-10-CM

## 2019-12-20 DIAGNOSIS — Z13.29 SCREENING FOR THYROID DISORDER: ICD-10-CM

## 2019-12-20 DIAGNOSIS — Z13.220 SCREENING FOR HYPERLIPIDEMIA: ICD-10-CM

## 2019-12-20 DIAGNOSIS — Z96.651 STATUS POST TOTAL RIGHT KNEE REPLACEMENT: ICD-10-CM

## 2019-12-20 DIAGNOSIS — Z12.11 SCREEN FOR COLON CANCER: ICD-10-CM

## 2019-12-20 DIAGNOSIS — J45.20 MILD INTERMITTENT ASTHMA WITHOUT COMPLICATION: ICD-10-CM

## 2019-12-20 LAB
CHOLESTEROL, TOTAL: 204 MG/DL (ref 0–199)
HDLC SERPL-MCNC: 73 MG/DL (ref 40–60)
LDL CHOLESTEROL CALCULATED: 112 MG/DL
TRIGL SERPL-MCNC: 95 MG/DL (ref 0–150)
TSH REFLEX: 1.2 UIU/ML (ref 0.27–4.2)
VLDLC SERPL CALC-MCNC: 19 MG/DL

## 2019-12-20 PROCEDURE — 99213 OFFICE O/P EST LOW 20 MIN: CPT | Performed by: NURSE PRACTITIONER

## 2019-12-20 SDOH — ECONOMIC STABILITY: TRANSPORTATION INSECURITY
IN THE PAST 12 MONTHS, HAS THE LACK OF TRANSPORTATION KEPT YOU FROM MEDICAL APPOINTMENTS OR FROM GETTING MEDICATIONS?: NO

## 2019-12-20 SDOH — ECONOMIC STABILITY: FOOD INSECURITY: WITHIN THE PAST 12 MONTHS, YOU WORRIED THAT YOUR FOOD WOULD RUN OUT BEFORE YOU GOT MONEY TO BUY MORE.: OFTEN TRUE

## 2019-12-20 SDOH — ECONOMIC STABILITY: FOOD INSECURITY: WITHIN THE PAST 12 MONTHS, THE FOOD YOU BOUGHT JUST DIDN'T LAST AND YOU DIDN'T HAVE MONEY TO GET MORE.: OFTEN TRUE

## 2019-12-20 SDOH — ECONOMIC STABILITY: TRANSPORTATION INSECURITY
IN THE PAST 12 MONTHS, HAS LACK OF TRANSPORTATION KEPT YOU FROM MEETINGS, WORK, OR FROM GETTING THINGS NEEDED FOR DAILY LIVING?: NO

## 2019-12-20 SDOH — ECONOMIC STABILITY: INCOME INSECURITY: HOW HARD IS IT FOR YOU TO PAY FOR THE VERY BASICS LIKE FOOD, HOUSING, MEDICAL CARE, AND HEATING?: SOMEWHAT HARD

## 2019-12-20 ASSESSMENT — ENCOUNTER SYMPTOMS
CHEST TIGHTNESS: 0
DIARRHEA: 0
SHORTNESS OF BREATH: 0
ABDOMINAL PAIN: 0
CONSTIPATION: 0

## 2019-12-21 LAB
ESTIMATED AVERAGE GLUCOSE: 134.1 MG/DL
HBA1C MFR BLD: 6.3 %

## 2020-01-22 ENCOUNTER — TELEPHONE (OUTPATIENT)
Dept: INTERNAL MEDICINE CLINIC | Age: 55
End: 2020-01-22

## 2020-01-22 RX ORDER — TRAMADOL HYDROCHLORIDE 50 MG/1
50 TABLET ORAL 2 TIMES DAILY PRN
Qty: 60 TABLET | Refills: 0 | Status: CANCELLED | OUTPATIENT
Start: 2020-01-22 | End: 2020-04-03

## 2020-01-22 NOTE — TELEPHONE ENCOUNTER
Would like to know if you can refill her tramadol. She takes this for her arthritis. Leg where knee was replaced. allergies penc. demerol and erythrocin,, [facility] [phone]. Any problems please call her back at [phone].

## 2020-01-24 ENCOUNTER — TELEPHONE (OUTPATIENT)
Dept: INTERNAL MEDICINE CLINIC | Age: 55
End: 2020-01-24

## 2020-01-24 RX ORDER — TRAMADOL HYDROCHLORIDE 50 MG/1
50 TABLET ORAL 2 TIMES DAILY
Qty: 30 TABLET | Refills: 0 | Status: SHIPPED | OUTPATIENT
Start: 2020-01-24 | End: 2020-02-21 | Stop reason: SDUPTHER

## 2020-01-24 NOTE — TELEPHONE ENCOUNTER
Has appt with Dr Martínez Berger on 01/28/2020. Mary sent RX for Tramadol. The patient has been notified of this information and all questions answered.

## 2020-01-28 ENCOUNTER — OFFICE VISIT (OUTPATIENT)
Dept: PAIN MANAGEMENT | Age: 55
End: 2020-01-28
Payer: COMMERCIAL

## 2020-01-28 VITALS
WEIGHT: 214 LBS | HEART RATE: 66 BPM | DIASTOLIC BLOOD PRESSURE: 89 MMHG | BODY MASS INDEX: 32.43 KG/M2 | SYSTOLIC BLOOD PRESSURE: 161 MMHG | HEIGHT: 68 IN

## 2020-01-28 PROBLEM — G89.4 CHRONIC PAIN SYNDROME: Status: ACTIVE | Noted: 2020-01-28

## 2020-01-28 PROBLEM — M79.7 FIBROMYALGIA: Status: ACTIVE | Noted: 2020-01-28

## 2020-01-28 PROBLEM — T84.84XA PAIN IN PROSTHETIC JOINT (HCC): Status: ACTIVE | Noted: 2020-01-28

## 2020-01-28 PROCEDURE — 99244 OFF/OP CNSLTJ NEW/EST MOD 40: CPT | Performed by: INTERNAL MEDICINE

## 2020-01-28 RX ORDER — LORAZEPAM 0.5 MG/1
0.5 TABLET ORAL EVERY 6 HOURS PRN
COMMUNITY
End: 2020-03-24

## 2020-01-31 NOTE — PROGRESS NOTES
information recorded by the MA was again reviewed on February 23, 2020   These forms have been scanned into the \"media\" tab of patients electronic medical record. PHYSICAL EXAM:  Please see the physical exam form for a detailed examination on this visit. This form has been completed and scanned into the  Media section of the chart  This is middle-aged female well-developed no apparent acute distress alert oriented x3 mood mood and affect is normal gait is normal gross motor coordination is normal is normal some Eren signs positive. Back and trunk exam shows tenderness paralumbar region SI joint region range of motion restricted pain on extension motor strength 4/5 sensory exam is grossly unremarkable reflexes show guarding of the right knee ankle is +1 the left knee is +2 and left ankle is +1. Straight leg raising is 60 degrees. Femoral stretch is negative Jessica's test is negative. BP (!) 161/89   Pulse 66   Ht 5' 8\" (1.727 m)   Wt 214 lb (97.1 kg)   BMI 32.54 kg/m²   Skin: Warm and dry. Good turgor. No rashes. No lesions or marks noted  Eyes:  PERRLA, cornea/ conjunctiva normal, no nystagmus  HENT:  Atraumatic, external ears normal, nose normal, oropharynx moist, no pharyngeal exudates. Neck- normal range of motion, no tenderness, supple. No bruit, no JVD, No lymph nodes appreciated. Thyroid is not enlarged  Respiratory: Lungs CTAP, No respiratory distress, normal breath sounds, no rales, no wheezing   Cardiovascular:  Normal S1,S2, Normal rate, normal rhythm, no murmurs, no gallops, no rubs   GI:  Soft, nondistended, normal bowel sounds, nontender, no organomegaly, no mass, no rebound, no guarding  :  No costovertebral angle tenderness   Musculoskeletal:  No clubbing, no edema, no tenderness, no deformities. There are no varicosities  Lymphatic:  No lymphadenopathy noted   Neurologic:  Alert & oriented x 3, CN 2-12 normal, normal motor function, normal sensory function, no focal deficits noted interfernce  of pain with ADL's. Ability to do home exercises independently. Ability to do household chores indoor and/or outdoor work and social and leisure activities. To increase flexibility/ROM, strength and endurance. Improve psychosocial and physical functioning. 2. Improving sleep to 6-7 hours a night. Improve mood/ anxiety and depression symptoms such as crying spells, low energy, problems with concentration, motivation. 3. Reduction of reliance on opioid analgesia/more appropriate opioid use. Risks and benefits of the medications and other alternative treatments have been/were  discussed with the patient. Any questions on the  common side effects of these medications were also answered. Informed verbal consent was obtained. The current treatment regimen is needed to decrease the patient's pain  symptoms, improve the quality of life and ability to function and improve the  sleep and mood symptoms. Patient was advised against drinking alcohol with the narcotic pain medicines, advised against driving or handling machinery when  starting or adjusting the dose of medicines, feeling groggy or drowsy, or if having any cognitive issues related to the current medications. Patient is fully aware of the risk of overdose and death, if medicines are misused and not taken as prescribed. If Patient develops new symptoms or if the symptoms worsen,  was told to call the office. Thank you for allowing me to participate in the care of this patient. Parag Hammond (Edmund Campa MD    Dragon disclaimer: This note was dictated utilizing voice recognition software. Minor errors in transcription may be present.     CC:  Vale Salazar, APRN - CNP

## 2020-02-05 ENCOUNTER — TELEPHONE (OUTPATIENT)
Dept: PAIN MANAGEMENT | Age: 55
End: 2020-02-05

## 2020-02-12 ENCOUNTER — TELEPHONE (OUTPATIENT)
Dept: PAIN MANAGEMENT | Age: 55
End: 2020-02-12

## 2020-02-21 ENCOUNTER — TELEPHONE (OUTPATIENT)
Dept: PAIN MANAGEMENT | Age: 55
End: 2020-02-21

## 2020-02-21 RX ORDER — TRAMADOL HYDROCHLORIDE 50 MG/1
50 TABLET ORAL DAILY
Qty: 6 TABLET | Refills: 0 | OUTPATIENT
Start: 2020-02-21 | End: 2020-02-26 | Stop reason: SDUPTHER

## 2020-02-21 NOTE — TELEPHONE ENCOUNTER
Patient states she needs refill for her Tramadol until comes in for her appt on Wen 2/26. Called to [facility] on [address]

## 2020-02-26 ENCOUNTER — OFFICE VISIT (OUTPATIENT)
Dept: PAIN MANAGEMENT | Age: 55
End: 2020-02-26
Payer: COMMERCIAL

## 2020-02-26 VITALS
SYSTOLIC BLOOD PRESSURE: 162 MMHG | HEART RATE: 55 BPM | WEIGHT: 214 LBS | DIASTOLIC BLOOD PRESSURE: 76 MMHG | BODY MASS INDEX: 32.54 KG/M2

## 2020-02-26 PROCEDURE — 99213 OFFICE O/P EST LOW 20 MIN: CPT | Performed by: INTERNAL MEDICINE

## 2020-02-26 RX ORDER — TRAMADOL HYDROCHLORIDE 50 MG/1
50 TABLET ORAL EVERY 6 HOURS PRN
Qty: 56 TABLET | Refills: 0 | Status: SHIPPED | OUTPATIENT
Start: 2020-02-26 | End: 2020-03-25 | Stop reason: SDUPTHER

## 2020-02-26 NOTE — PROGRESS NOTES
PLAN:  Informed verbal consent was obtained  -[place] record was obtained and reviewed  for the last one year and no indicators of drug misuse  were found. Any other controlled substance prescriptions  seen on the record have been accounted for, I am aware of the patient receiving these medications. Stan Barboza OAR[place] record will be rechecked as part of office protocol.    -continue with current opioid regimen, Ultram 1-2 per day along with Ibuprofen   -She was advised weight reduction, diet changes- 800-1200 matthew diet, diet diary, exercising, nutritional  consult increased physical activity as tolerated   -will f/u on lumbar spine xray  -Patient's urine drug screen results with GC/MS confirmation were obtained and reviewed and were negative for any illicit drugs. Prescribed medications were within acceptable range. Current Outpatient Medications   Medication Sig Dispense Refill    traMADol (ULTRAM) 50 MG tablet Take 1 tablet by mouth daily for 6 days. 6 tablet 0    LORazepam (ATIVAN) 0.5 MG tablet Take 0.5 mg by mouth every 6 hours as needed for Anxiety.  Fluticasone Propionate (FLONASE NA) by Nasal route      albuterol sulfate HFA (VENTOLIN HFA) 108 (90 Base) MCG/ACT inhaler Inhale 2 puffs into the lungs every 6 hours as needed for Wheezing 1 Inhaler 2    ibuprofen (ADVIL;MOTRIN) 800 MG tablet Take 1 tablet by mouth every 8 hours as needed for Pain 120 tablet 2    melatonin 3 MG TABS tablet Take 3 mg by mouth nightly       No current facility-administered medications for this visit. I will continue her current medication regimen  which is part of the above treatment schedule. It has been helping with Ms. 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    Risks and benefits of the medications and other alternative

## 2020-03-24 ENCOUNTER — TELEPHONE (OUTPATIENT)
Dept: INTERNAL MEDICINE CLINIC | Age: 55
End: 2020-03-24

## 2020-03-24 RX ORDER — LORAZEPAM 0.5 MG/1
0.5 TABLET ORAL 2 TIMES DAILY PRN
Qty: 60 TABLET | Refills: 0 | Status: SHIPPED | OUTPATIENT
Start: 2020-03-24 | End: 2020-04-23

## 2020-03-24 RX ORDER — LORAZEPAM 0.5 MG/1
0.5 TABLET ORAL EVERY 6 HOURS PRN
Status: CANCELLED | OUTPATIENT
Start: 2020-03-24

## 2020-03-25 ENCOUNTER — OFFICE VISIT (OUTPATIENT)
Dept: PAIN MANAGEMENT | Age: 55
End: 2020-03-25
Payer: COMMERCIAL

## 2020-03-25 VITALS
HEART RATE: 58 BPM | WEIGHT: 214 LBS | TEMPERATURE: 98.3 F | SYSTOLIC BLOOD PRESSURE: 155 MMHG | BODY MASS INDEX: 32.54 KG/M2 | DIASTOLIC BLOOD PRESSURE: 91 MMHG

## 2020-03-25 PROCEDURE — 99213 OFFICE O/P EST LOW 20 MIN: CPT | Performed by: INTERNAL MEDICINE

## 2020-03-25 RX ORDER — IBUPROFEN 800 MG/1
800 TABLET ORAL 2 TIMES DAILY PRN
Qty: 60 TABLET | Refills: 0 | Status: SHIPPED | OUTPATIENT
Start: 2020-03-25 | End: 2020-04-22 | Stop reason: SDUPTHER

## 2020-03-25 RX ORDER — TRAMADOL HYDROCHLORIDE 50 MG/1
50 TABLET ORAL EVERY 6 HOURS PRN
Qty: 56 TABLET | Refills: 0 | Status: SHIPPED | OUTPATIENT
Start: 2020-03-25 | End: 2020-04-22 | Stop reason: SDUPTHER

## 2020-03-25 NOTE — PROGRESS NOTES
Charan Chambers  1965  <[id number]>      HISTORY OF PRESENT ILLNESS:  Ms. Chu Conway is a 53 y.o. female returns for a follow up visit for pain management  She has a diagnosis of   1. Chronic pain syndrome    2. Fibromyalgia    3. 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 She complains of pain in the left shoulder, upper back, right hand, right knee, right leg, right foot She rates the pain 8/10 and describes it as aching, numbness, pins and needles. Current treatment regimen has helped relieve about 70% of the pain. She denies any side effects from the current pain regimen. Patient reports that since the last follow up visit the physical functioning is unchanged, family/social relationships are unchanged, mood is unchanged sleep patterns are unchanged, and that the overall functioning is unchanged. Patient denies misusing/abusing her narcotic pain medications or using any illegal drugs. There are No indicators for possible drug abuse, addiction or diversion problems. Ms. Chu Conway states she is having increase pain in the back and right hip and leg. She states she is using Ultram along with Motrin. Patient says she has not had any xray's done and refusing any injections. She says she does not want to take any other adjuvants. ALLERGIES: Patients list of allergies were reviewed     MEDICATIONS: Ms. Chu Conway list of medications were reviewed. Her current medications are   Outpatient Medications Prior to Visit   Medication Sig Dispense Refill    LORazepam (ATIVAN) 0.5 MG tablet Take 1 tablet by mouth 2 times daily as needed for Anxiety for up to 30 days. 60 tablet 0    traMADol (ULTRAM) 50 MG tablet Take 1 tablet by mouth every 6 hours as needed for Pain (Max 1-2 per day) for up to 28 days.  56 tablet 0    Fluticasone Propionate (FLONASE NA) by Nasal route      albuterol sulfate HFA (VENTOLIN HFA) 108 (90 Base) MCG/ACT inhaler Inhale 2 puffs into the lungs every 6 hours as needed for Wheezing 1 Inhaler 2    ibuprofen (ADVIL;MOTRIN) 800 MG tablet Take 1 tablet by mouth every 8 hours as needed for Pain 120 tablet 2    melatonin 3 MG TABS tablet Take 3 mg by mouth nightly       No facility-administered medications prior to visit. SOCIAL/FAMILY/PAST MEDICAL HISTORY: Ms. Craig Fonseca, family and past medical history was reviewed. REVIEW OF SYSTEMS:    Respiratory: Negative for apnea, chest tightness and shortness of breath or change in baseline breathing. Gastrointestinal: Negative for nausea, vomiting, abdominal pain, diarrhea, constipation, blood in stool and abdominal distention. PHYSICAL EXAM:   Nursing note and vitals reviewed. BP (!) 155/91   Pulse 58   Temp 98.3 °F (36.8 °C)   Wt 214 lb (97.1 kg)   BMI 32.54 kg/m²   Constitutional: She appears well-developed and well-nourished. No acute distress. Skin: Skin is warm and dry, good turgor. No rash noted. She is not diaphoretic. Cardiovascular: Normal rate, regular rhythm, normal heart sounds, and does not have murmur. Pulmonary/Chest: Effort normal. No respiratory distress. She does not have wheezes in the lung fields. She has no rales. Neurological/Psychiatric:She is alert and oriented to person, place, and time. Coordination is  normal.  Her mood isAppropriate and affect is Neutral/Euthymic(normal) . Her    IMPRESSION:   1. Chronic pain syndrome    2. Fibromyalgia    3. Primary localized osteoarthrosis of right lower leg    4.  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, sequela        PLAN:  Informed verbal consent was obtained  -continue with current opioid regimen, Ultram 1-2 per day  -She was advised to increase fluids ( 5-7  glasses of fluid daily), limit caffeine, avoid cheese products, increase dietary fiber, increase activity and exercise as tolerated and relax regularly and enjoy meals   -back stretching exercises as advised   -continue with Ibuprofen 800 BID PRN  -ORT score is 0 Current Outpatient Medications   Medication Sig Dispense Refill    LORazepam (ATIVAN) 0.5 MG tablet Take 1 tablet by mouth 2 times daily as needed for Anxiety for up to 30 days. 60 tablet 0    traMADol (ULTRAM) 50 MG tablet Take 1 tablet by mouth every 6 hours as needed for Pain (Max 1-2 per day) for up to 28 days. 56 tablet 0    Fluticasone Propionate (FLONASE NA) by Nasal route      albuterol sulfate HFA (VENTOLIN HFA) 108 (90 Base) MCG/ACT inhaler Inhale 2 puffs into the lungs every 6 hours as needed for Wheezing 1 Inhaler 2    ibuprofen (ADVIL;MOTRIN) 800 MG tablet Take 1 tablet by mouth every 8 hours as needed for Pain 120 tablet 2    melatonin 3 MG TABS tablet Take 3 mg by mouth nightly       No current facility-administered medications for this visit. I will continue her current medication regimen  which is part of the above treatment schedule. It has been helping with Ms. 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 Risks and benefits of the medications and other alternative treatments  including no treatment were discussed with the patient. The common side effects of these medications were also explained to the patient. Informed verbal consent was obtained. Goals of current treatment regimen include improvement in pain, restoration of functioning- with focus on improvement in physical performance, general activity, work or disability,emotional distress, health care utilization and  decreased medication consumption. Will continue to monitor progress towards achieving/maintaining therapeutic goals with special emphasis on  1. Improvement in perceived interfernce  of pain with ADL's. Ability to do home exercises independently.  Ability to do household chores indoor and/or outdoor work and social and leisure activities. Improve psychosocial and physical functioning. - she is showing progression towards this treatment goal with the current regimen. She was advised against drinking alcohol with the narcotic pain medicines, advised against driving or handling machinery while adjusting the dose of medicines or if having cognitive  issues related to the current medications. Risk of overdose and death, if medicines not taken as prescribed, were also discussed. If the patient develops new symptoms or if the symptoms worsen, the patient should call the office. While transcribing every attempt was made to maintain the accuracy of the note in terms of it's contents,there may have been some errors made inadvertently. Thank you for allowing me to participate in the care of this patient.     Rosaura Traore MD.    Cc: Roula Lopez, APRN - CNP

## 2020-04-22 ENCOUNTER — VIRTUAL VISIT (OUTPATIENT)
Dept: PAIN MANAGEMENT | Age: 55
End: 2020-04-22
Payer: COMMERCIAL

## 2020-04-22 PROCEDURE — 99213 OFFICE O/P EST LOW 20 MIN: CPT | Performed by: INTERNAL MEDICINE

## 2020-04-22 RX ORDER — TRAMADOL HYDROCHLORIDE 50 MG/1
50 TABLET ORAL EVERY 6 HOURS PRN
Qty: 56 TABLET | Refills: 0 | Status: SHIPPED | OUTPATIENT
Start: 2020-04-22 | End: 2020-06-01 | Stop reason: SDUPTHER

## 2020-04-22 RX ORDER — IBUPROFEN 800 MG/1
800 TABLET ORAL 2 TIMES DAILY PRN
Qty: 60 TABLET | Refills: 0 | Status: SHIPPED | OUTPATIENT
Start: 2020-04-22 | End: 2020-06-01 | Stop reason: SDUPTHER

## 2020-04-22 NOTE — PROGRESS NOTES
TELE HEALTH VISIT (AUDIO-VISUAL)    Pursuant to the emergency declaration under the [facility] waiver authority and the [employer] and [employer] Act, this Virtual  Visit was conducted, with patient's/legal guardian's consent, to reduce the patient's risk of exposure to COVID-19 and provide continuity of care for an established patient. Service is  provided through a video synchronous discussion virtually to substitute for in-person clinic visit. Due to this being a TeleHealth encounter (During HNZMS-14 public health emergency), evaluation of the following organ systems was limited: Vitals/Constitutional/EENT/Resp/CV/GI//MS/Neuro/Skin/Wbgc-Rruv-Tup. Gustavo Borges  1965  <[id number]>    Ms. Anne Hidalgo is being seen virtually for a follow up visit using one of the following techniques-Doxy. me/ATOMOOita Video visit/Google Duo or Face time. Informed verbal consent to the virtual visit was obtained from Ms. Anne Hidalgo. Risks associated with HIPPA compliance with the virtual visit was explained to the patient. Ms. Anne Hidalgo is at her residence and Dr. Angel Pérez is in his office. HISTORY OF PRESENT ILLNESS:  Ms. Anne Hidalgo is a 53 y.o. female  being assessed for a follow up visit for pain management for evaluation of ongoing care regarding her symptoms and monitoring of compliance with long term use high risk medications. She has a diagnosis of   1. Chronic pain syndrome    2. Fibromyalgia    3. 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 She complains of pain in the left leg  with radiation to the hips Left and knees Left . She rates the pain 6/10 and describes it as aching. Current treatment regimen has helped relieve about 20% of the pain. She denies any side effects from the current pain regimen.  Patient reports that since the last follow up visit the physical functioning is breath or change in baseline breathing. Gastrointestinal: Negative for nausea, vomiting, abdominal pain, diarrhea, constipation, blood in stool and abdominal distention. PHYSICAL EXAM:   Nursing note and vitals per patient reviewed. There were no vitals taken for this visit. Constitutional: She appears well-developed and well-nourished. No acute distress. No respiratory distress. Skin: Skin appears to be warm and dry. No rashes or any other marks noted. She is not diaphoretic. Neurological/Psychiatric:She is alert and oriented to person, place, and time. Coordination is  normal.  Her mood isAppropriate and affect is Neutral/Euthymic(normal). Musculoskeletal / Extremities: Range of motion is normal. Gait is normal, assistive devices use: none. IMPRESSION:   1. Chronic pain syndrome    2. Fibromyalgia    3. Primary localized osteoarthrosis of right lower leg    4. 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 Any other controlled substance prescriptions  seen on the record have been accounted for, I am aware of the patient receiving these medications. Kylie Lane OARRS record will be rechecked as part of office protocol. Current Outpatient Medications   Medication Sig Dispense Refill    traMADol (ULTRAM) 50 MG tablet Take 1 tablet by mouth every 6 hours as needed for Pain (Max 2 per day) for up to 28 days. 56 tablet 0    ibuprofen (ADVIL;MOTRIN) 800 MG tablet Take 1 tablet by mouth 2 times daily as needed for Pain 60 tablet 0    LORazepam (ATIVAN) 0.5 MG tablet Take 1 tablet by mouth 2 times daily as needed for Anxiety for up to 30 days.  60 tablet 0    Fluticasone Propionate (FLONASE NA) medications. Risk of overdose and death, if medicines not taken as prescribed, were also discussed. If the patient develops new symptoms or if the symptoms worsen, the patient should call the office. While transcribing every attempt was made to maintain the accuracy of the note in terms of it's contents,there may have been some errors made inadvertently. Thank you for allowing me to participate in the care of this patient.     Ev King MD.    Cc: MAGNOLIA Birmingham - CNP

## 2020-06-01 ENCOUNTER — VIRTUAL VISIT (OUTPATIENT)
Dept: PAIN MANAGEMENT | Age: 55
End: 2020-06-01
Payer: COMMERCIAL

## 2020-06-01 PROCEDURE — 99213 OFFICE O/P EST LOW 20 MIN: CPT | Performed by: INTERNAL MEDICINE

## 2020-06-01 RX ORDER — IBUPROFEN 800 MG/1
800 TABLET ORAL 2 TIMES DAILY PRN
Qty: 60 TABLET | Refills: 0 | Status: SHIPPED | OUTPATIENT
Start: 2020-06-01 | End: 2020-06-29 | Stop reason: SDUPTHER

## 2020-06-01 RX ORDER — TRAMADOL HYDROCHLORIDE 50 MG/1
50 TABLET ORAL EVERY 6 HOURS PRN
Qty: 56 TABLET | Refills: 0 | Status: SHIPPED | OUTPATIENT
Start: 2020-06-01 | End: 2020-06-29 | Stop reason: SDUPTHER

## 2020-06-01 NOTE — PROGRESS NOTES
TELE HEALTH VISIT (AUDIO-VISUAL)    Pursuant to the emergency declaration under the [facility] waiver authority and the [employer] and [employer] Act, this Virtual  Visit was conducted, with patient's/legal guardian's consent, to reduce the patient's risk of exposure to COVID-19 and provide continuity of care for an established patient. Service is  provided through a video synchronous discussion virtually to substitute for in-person clinic visit. Due to this being a TeleHealth encounter (During QJZIY-30 public health emergency), evaluation of the following organ systems was limited: Vitals/Constitutional/EENT/Resp/CV/GI//MS/Neuro/Skin/Dlif-Kqop-Pke. Ulysess Poster  1965  <[id number]>    Ms. Maye Fitzpatrick is being seen virtually for a follow up visit using one of the following techniques-Doxy. me/Res[employer] Video visit/Google Duo or Face time. Informed verbal consent to the virtual visit was obtained from Ms. Maye Fitzpatrick. Risks associated with HIPPA compliance with the virtual visit was explained to the patient. Ms. Maye Fitzpatrick is at her residence and Dr. Sheldon Weinberg is in his office. HISTORY OF PRESENT ILLNESS:  Ms. Maye Fitzpatrick is a 53 y.o. female  being assessed for a follow up visit for pain management for evaluation of ongoing care regarding her symptoms and monitoring of compliance with long term use high risk medications. She has a diagnosis of   1. Chronic pain syndrome    2. Fibromyalgia    3. Primary localized osteoarthrosis of right lower leg    . She complains of pain in the Neck, upper and low back   with radiation to the shoulders Bilateral, arms Bilateral, elbows Bilateral, hands Bilateral, buttocks, hips Bilateral, upper leg Bilateral, knees Bilateral, lower leg Bilateral, ankles Bilateral and feet Bilateral . She rates the pain 6/10 and describes it as sharp, aching, burning.  Current treatment regimen has helped relieve about were no vitals taken for this visit. Constitutional: She appears well-developed and well-nourished. No acute distress. No respiratory distress. Skin: Skin appears to be warm and dry. No rashes or any other marks noted. She is not diaphoretic. Respiratory/Pulmonary: NO conversational dyspnea, no accessory muscle use, no coughing during exam. She does not appear to be in labored breathing. Neurological/Psychiatric:She is alert and oriented to person, place, and time. Coordination is  normal.  Her mood isAppropriate and affect is Flat/blunted and Anxious. Musculoskeletal / Extremities: Range of motion is normal. Gait is normal, assistive devices use: none. IMPRESSION:   1. Chronic pain syndrome    2. Fibromyalgia    3. 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 Any other controlled substance prescriptions  seen on the record have been accounted for, I am aware of the patient receiving these medications. Ally Morrow OARRS record will be rechecked as part of office protocol.    -Continue with current opioid regimen Ultram 1-2 per day   -Adv Biofeedback, relaxation and meditation techniques.  Referral to psychologist for CBT was also discussed with patient   -She was advised to increase fluids ( 5-7  glasses of fluid daily), limit caffeine, avoid cheese products, increase dietary fiber, increase activity and exercise as tolerated and relax regularly and enjoy meals    Current Outpatient Medications   Medication Sig Dispense Refill    Fluticasone Propionate (FLONASE NA) by Nasal route      albuterol sulfate HFA (VENTOLIN HFA) 108 (90 Base) MCG/ACT inhaler Inhale 2 puffs into the lungs every 6 hours as needed for Wheezing 1 Inhaler 2    ibuprofen (ADVIL;MOTRIN) 800 MG tablet Take 1 tablet by

## 2020-06-29 ENCOUNTER — VIRTUAL VISIT (OUTPATIENT)
Dept: PAIN MANAGEMENT | Age: 55
End: 2020-06-29
Payer: COMMERCIAL

## 2020-06-29 PROCEDURE — 99213 OFFICE O/P EST LOW 20 MIN: CPT | Performed by: INTERNAL MEDICINE

## 2020-06-29 RX ORDER — IBUPROFEN 800 MG/1
800 TABLET ORAL 2 TIMES DAILY PRN
Qty: 60 TABLET | Refills: 0 | Status: SHIPPED | OUTPATIENT
Start: 2020-06-29 | End: 2020-07-27 | Stop reason: ALTCHOICE

## 2020-06-29 RX ORDER — TRAMADOL HYDROCHLORIDE 50 MG/1
50 TABLET ORAL EVERY 6 HOURS PRN
Qty: 70 TABLET | Refills: 0 | Status: SHIPPED | OUTPATIENT
Start: 2020-06-29 | End: 2020-07-27 | Stop reason: SDUPTHER

## 2020-07-08 ENCOUNTER — TELEPHONE (OUTPATIENT)
Dept: INTERNAL MEDICINE CLINIC | Age: 55
End: 2020-07-08

## 2020-07-09 RX ORDER — ALBUTEROL SULFATE 90 UG/1
2 AEROSOL, METERED RESPIRATORY (INHALATION) EVERY 6 HOURS PRN
Qty: 1 INHALER | Refills: 0 | Status: SHIPPED | OUTPATIENT
Start: 2020-07-09 | End: 2020-11-05 | Stop reason: SDUPTHER

## 2020-07-27 ENCOUNTER — VIRTUAL VISIT (OUTPATIENT)
Dept: PAIN MANAGEMENT | Age: 55
End: 2020-07-27
Payer: COMMERCIAL

## 2020-07-27 PROCEDURE — 99213 OFFICE O/P EST LOW 20 MIN: CPT | Performed by: INTERNAL MEDICINE

## 2020-07-27 RX ORDER — TRAMADOL HYDROCHLORIDE 50 MG/1
50 TABLET ORAL EVERY 6 HOURS PRN
Qty: 70 TABLET | Refills: 0 | Status: SHIPPED | OUTPATIENT
Start: 2020-07-27 | End: 2020-08-24 | Stop reason: SDUPTHER

## 2020-07-27 RX ORDER — CELECOXIB 200 MG/1
200 CAPSULE ORAL DAILY
Qty: 30 CAPSULE | Refills: 0 | Status: SHIPPED | OUTPATIENT
Start: 2020-07-27 | End: 2020-09-21

## 2020-07-27 NOTE — PROGRESS NOTES
TELE HEALTH VISIT (AUDIO-VISUAL)    Pursuant to the emergency declaration under the [facility] waiver authority and the [employer] Act, this Virtual  Visit was conducted, with patient's/legal guardian's consent, to reduce the patient's risk of exposure to COVID-19 and provide continuity of care for an established patient. Service is  provided through a video synchronous discussion virtually to substitute for in-person clinic visit. Due to this being a TeleHealth encounter (During JRJAR-74 public health emergency), evaluation of the following organ systems was limited: Vitals/Constitutional/EENT/Resp/CV/GI//MS/Neuro/Skin/Dwhw-Zcfn-Hdl. Darrell Huffman  1965  <[id number]>    Ms. Prince Noonan is being seen virtually for a follow up visit using one of the following techniques  Google Duo  Informed verbal consent to the virtual visit was obtained from Ms. Prince Noonan. Risks associated with HIPPA compliance with the virtual visit was explained to the patient. Ms. Prince Noonan is at her residence and Dr. Forrest Rios is in his office. HISTORY OF PRESENT ILLNESS:  Ms. Prince Noonan is a 53 y.o. female  being assessed for a follow up visit for pain management for evaluation of ongoing care regarding her symptoms and monitoring of compliance with long term use high risk medications. She has a diagnosis of   1. Chronic pain syndrome    2. Fibromyalgia    3. 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 She complains of pain in the bilateral knees  with radiation to the lower leg Bilateral, ankles Bilateral and feet Bilateral . She rates the pain 8/10 and describes it as aching, burning. Current treatment regimen has helped relieve about 60% of the pain. She denies any side effects from the current pain regimen.  Patient reports that since the last follow up visit the physical functioning is unchanged, family/social relationships are unchanged, mood is unchanged sleep patterns are unchanged, and that the overall functioning is unchanged. Patient denies misusing/abusing her narcotic pain medications or using any illegal drugs. There are No indicators for possible drug abuse, addiction or diversion problems. Ms. Samara Childs states she has been doing fair. She states she has been feeling tired. Patient reports she is working full time still. She states her knees hurt a lot, she has to stand 8 hours at work. Patient mentions she is using Ibuprofen and Ultram 2-3 per day. ALLERGIES: Patients list of allergies were reviewed     MEDICATIONS: Ms. Samara Childs list of medications were reviewed. Her current medications are   Outpatient Medications Prior to Visit   Medication Sig Dispense Refill    albuterol sulfate HFA (VENTOLIN HFA) 108 (90 Base) MCG/ACT inhaler Inhale 2 puffs into the lungs every 6 hours as needed for Wheezing 1 Inhaler 0    traMADol (ULTRAM) 50 MG tablet Take 1 tablet by mouth every 6 hours as needed for Pain (Max 2-3 per day) for up to 28 days. 70 tablet 0    ibuprofen (ADVIL;MOTRIN) 800 MG tablet Take 1 tablet by mouth 2 times daily as needed for Pain 60 tablet 0    Fluticasone Propionate (FLONASE NA) by Nasal route      ibuprofen (ADVIL;MOTRIN) 800 MG tablet Take 1 tablet by mouth every 8 hours as needed for Pain 120 tablet 2    melatonin 3 MG TABS tablet Take 3 mg by mouth nightly       No facility-administered medications prior to visit. SOCIAL/FAMILY/PAST MEDICAL HISTORY: Ms. Narcisa Huertacon, family and past medical history was reviewed. REVIEW OF SYSTEMS:    Respiratory: Negative for apnea, chest tightness and shortness of breath or change in baseline breathing. Gastrointestinal: Negative for nausea, vomiting, abdominal pain, diarrhea, constipation, blood in stool and abdominal distention. PHYSICAL EXAM:   Nursing note and vitals per patient reviewed.  There were nightly       No current facility-administered medications for this visit. I will continue her current medication regimen  which is part of the above treatment schedule. It has been helping with Ms. 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 Risks and benefits of the medications and other alternative treatments  including no treatment were discussed with the patient. The common side effects of these medications were also explained to the patient. Informed verbal consent was obtained. Goals of current treatment regimen include improvement in pain, restoration of functioning- with focus on improvement in physical performance, general activity, work or disability,emotional distress, health care utilization and  decreased medication consumption. Will continue to monitor progress towards achieving/maintaining therapeutic goals with special emphasis on  1. Improvement in perceived interfernce  of pain with ADL's. Ability to do home exercises independently. Ability to do household chores indoor and/or outdoor work and social and leisure activities. Improve psychosocial and physical functioning. - she is showing progression towards this treatment goal with the current regimen. She was advised against drinking alcohol with the narcotic pain medicines, advised against driving or handling machinery while adjusting the dose of medicines or if having cognitive  issues related to the current medications. Risk of overdose and death, if medicines not taken as prescribed, were also discussed. If the patient develops new symptoms or if the symptoms worsen, the patient should call the office.     While transcribing every attempt was made to maintain the accuracy of the note in terms of it's contents,there may have been some errors made inadvertently. Thank you for allowing me to participate in the care of this patient.     Bonita Mahmood MD.    Cc: Justice Andino, APRN - CNP

## 2020-08-24 ENCOUNTER — VIRTUAL VISIT (OUTPATIENT)
Dept: PAIN MANAGEMENT | Age: 55
End: 2020-08-24
Payer: COMMERCIAL

## 2020-08-24 PROCEDURE — 99213 OFFICE O/P EST LOW 20 MIN: CPT | Performed by: INTERNAL MEDICINE

## 2020-08-24 RX ORDER — IBUPROFEN 800 MG/1
800 TABLET ORAL 2 TIMES DAILY PRN
Qty: 60 TABLET | Refills: 1 | Status: SHIPPED | OUTPATIENT
Start: 2020-08-24 | End: 2020-09-21 | Stop reason: SDUPTHER

## 2020-08-24 RX ORDER — TRAMADOL HYDROCHLORIDE 50 MG/1
50 TABLET ORAL EVERY 6 HOURS PRN
Qty: 70 TABLET | Refills: 0 | Status: SHIPPED | OUTPATIENT
Start: 2020-08-24 | End: 2020-09-21 | Stop reason: ALTCHOICE

## 2020-08-24 NOTE — PROGRESS NOTES
TELE HEALTH VISIT (AUDIO-VISUAL)    Pursuant to the emergency declaration under the [facility], [facility] waiver authority and the [employer] and [employer] Act, this Virtual  Visit was conducted, with patient's/legal guardian's consent, to reduce the patient's risk of exposure to COVID-19 and provide continuity of care for an established patient. Service is  provided through a video synchronous discussion virtually to substitute for in-person clinic visit. Due to this being a TeleHealth encounter (During PVSFX-06 public health emergency), evaluation of the following organ systems was limited: Vitals/Constitutional/EENT/Resp/CV/GI//MS/Neuro/Skin/Gduc-Ptdl-Vib. Glenda Kumar  1965  <[id number]>    Ms. Bronson Garcia is being seen virtually for a follow up visit using one of the following techniques  Google Duo  Informed verbal consent to the virtual visit was obtained from Ms. Bronson Garcia. Risks associated with HIPPA compliance with the virtual visit was explained to the patient. Ms. Bronson Garcia is at her residence and Dr. Mika Benson is in his office. HISTORY OF PRESENT ILLNESS:  Ms. Bronson Garcia is a 53 y.o. female  being assessed for a follow up visit for pain management for evaluation of ongoing care regarding her symptoms and monitoring of compliance with long term use high risk medications. She has a diagnosis of   1. Chronic pain syndrome    2. Fibromyalgia    3. 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 She complains of pain in the lower back  with radiation to the buttocks, hips Bilateral, upper leg Bilateral, knees Bilateral, lower leg Bilateral and ankles Bilateral . She rates the pain 5/10 and describes it as aching. Current treatment regimen has helped relieve about 20% of the pain. She denies any side effects from the current pain regimen.  Patient reports that since the last follow acute distress. No respiratory distress. Skin: Skin appears to be warm and dry. No rashes or any other marks noted. She is not diaphoretic. Respiratory/Pulmonary: NO conversational dyspnea, no accessory muscle use, no coughing during exam. She does not appear to be in labored breathing. Neurological/Psychiatric:She is alert and oriented to person, place, and time. Coordination is  normal.  Her mood isAppropriate and affect is Neutral/Euthymic(normal). Musculoskeletal / Extremities: Range of motion is normal. Gait is normal, assistive devices use: none. IMPRESSION:   1. Chronic pain syndrome    2. Fibromyalgia    3. 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 Any other controlled substance prescriptions  seen on the record have been accounted for, I am aware of the patient receiving these medications. Marcelina Arredondo [place] record will be rechecked as part of office protocol.    -continue with current opioid regimen Ultram 2-3 per day  -Adv Biofeedback, relaxation and meditation techniques. Referral to psychologist for CBT was also discussed with patient  -She was advised to increase fluids ( 5-7  glasses of fluid daily), limit caffeine, avoid cheese products, increase dietary fiber, increase activity and exercise as tolerated and relax regularly and enjoy meals   -She was advised weight reduction, diet changes- 800-1200 matthew diet, diet diary, exercising, nutritional  consult increased physical activity as tolerated   -will retry Celebrex PRN     Current Outpatient Medications   Medication Sig Dispense Refill    traMADol (ULTRAM) 50 MG tablet Take 1 tablet by mouth every 6 hours as needed for Pain (Max 2-3 per day) for up to 28 days.  70 tablet 0    celecoxib (CELEBREX) 200 MG capsule Take 1 capsule by mouth daily 30 capsule 0    albuterol sulfate HFA (VENTOLIN HFA) 108 (90 Base) MCG/ACT inhaler Inhale 2 puffs into the lungs every 6 hours as needed for Wheezing 1 Inhaler 0    Fluticasone Propionate (FLONASE NA) by Nasal route      melatonin 3 MG TABS tablet Take 3 mg by mouth nightly       No current facility-administered medications for this visit. I will continue her current medication regimen  which is part of the above treatment schedule. It has been helping with Ms. 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 Risks and benefits of the medications and other alternative treatments  including no treatment were discussed with the patient. The common side effects of these medications were also explained to the patient. Informed verbal consent was obtained. Goals of current treatment regimen include improvement in pain, restoration of functioning- with focus on improvement in physical performance, general activity, work or disability,emotional distress, health care utilization and  decreased medication consumption. Will continue to monitor progress towards achieving/maintaining therapeutic goals with special emphasis on  1. Improvement in perceived interfernce  of pain with ADL's. Ability to do home exercises independently. Ability to do household chores indoor and/or outdoor work and social and leisure activities. Improve psychosocial and physical functioning. - she is showing progression towards this treatment goal with the current regimen. She was advised against drinking alcohol with the narcotic pain medicines, advised against driving or handling machinery while adjusting the dose of medicines or if having cognitive  issues related to the current medications. Risk of overdose and death, if medicines not taken as prescribed, were also discussed. If the patient develops new symptoms or if the symptoms worsen, the patient should call the office. While transcribing every attempt was made to maintain the accuracy of the note in terms of it's contents,there may have been some errors made inadvertently. Thank you for allowing me to participate in the care of this patient.     Kary Dahl MD.    Cc: Tutu Fishman, MAGNOLIA - SHELBY

## 2020-09-21 ENCOUNTER — VIRTUAL VISIT (OUTPATIENT)
Dept: PAIN MANAGEMENT | Age: 55
End: 2020-09-21
Payer: COMMERCIAL

## 2020-09-21 PROCEDURE — 99213 OFFICE O/P EST LOW 20 MIN: CPT | Performed by: INTERNAL MEDICINE

## 2020-09-21 RX ORDER — IBUPROFEN 800 MG/1
800 TABLET ORAL 2 TIMES DAILY PRN
Qty: 60 TABLET | Refills: 1 | Status: SHIPPED | OUTPATIENT
Start: 2020-09-21 | End: 2020-10-19 | Stop reason: SDUPTHER

## 2020-09-21 RX ORDER — TRAMADOL HYDROCHLORIDE 50 MG/1
50 TABLET ORAL EVERY 6 HOURS PRN
Qty: 70 TABLET | Refills: 0 | Status: SHIPPED | OUTPATIENT
Start: 2020-09-21 | End: 2020-10-19 | Stop reason: SDUPTHER

## 2020-09-21 NOTE — PROGRESS NOTES
family/social relationships are unchanged, mood is unchanged sleep patterns are unchanged, and that the overall functioning is unchanged. Patient denies misusing/abusing her narcotic pain medications or using any illegal drugs. There are No indicators for possible drug abuse, addiction or diversion problems. Ms. Guanako Tran states she has been doing fair, pain medications are helping. She says she is standing at work mostly. Patient mentions she is using all her Ibuprofen along with Ultram. She states she is not using much Celebrex. Patient denies any constipation or GERD symptoms. ALLERGIES: Patients list of allergies were reviewed     MEDICATIONS: Ms. Guanako Tran list of medications were reviewed. Her current medications are   Outpatient Medications Prior to Visit   Medication Sig Dispense Refill    ibuprofen (ADVIL;MOTRIN) 800 MG tablet Take 1 tablet by mouth 2 times daily as needed for Pain 60 tablet 1    celecoxib (CELEBREX) 200 MG capsule Take 1 capsule by mouth daily 30 capsule 0    albuterol sulfate HFA (VENTOLIN HFA) 108 (90 Base) MCG/ACT inhaler Inhale 2 puffs into the lungs every 6 hours as needed for Wheezing 1 Inhaler 0    Fluticasone Propionate (FLONASE NA) by Nasal route      melatonin 3 MG TABS tablet Take 3 mg by mouth nightly      traMADol (ULTRAM) 50 MG tablet Take 1 tablet by mouth every 6 hours as needed for Pain (Max 2-3 per day) for up to 28 days. 70 tablet 0     No facility-administered medications prior to visit. SOCIAL/FAMILY/PAST MEDICAL HISTORY: Ms. Stef Miner, family and past medical history was reviewed. REVIEW OF SYSTEMS:    Respiratory: Negative for apnea, chest tightness and shortness of breath or change in baseline breathing. Gastrointestinal: Negative for nausea, vomiting, abdominal pain, diarrhea, constipation, blood in stool and abdominal distention. PHYSICAL EXAM:   Nursing note and vitals per patient reviewed. There were no vitals taken for this visit. Constitutional: She appears well-developed and well-nourished. No acute distress. No respiratory distress. Skin: Skin appears to be warm and dry. No rashes or any other marks noted. She is not diaphoretic. Respiratory/Pulmonary: NO conversational dyspnea, no accessory muscle use, no coughing during exam. She does not appear to be in labored breathing. Neurological/Psychiatric:She is alert and oriented to person, place, and time. Coordination is  normal.  Her mood isAppropriate and affect is Neutral/Euthymic(normal). Musculoskeletal / Extremities: Range of motion is normal. Gait is normal, assistive devices use: none. IMPRESSION:   1. Chronic pain syndrome    2. Fibromyalgia    3. 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 Any other controlled substance prescriptions  seen on the record have been accounted for, I am aware of the patient receiving these medications. Gerry Levin [place] record will be rechecked as part of office protocol.    -continue with current opioid regimen Ultram 2-3 per day  -Adv Biofeedback, relaxation and meditation techniques.  Referral to psychologist for CBT was also discussed with patient  -She was advised to increase fluids ( 5-7  glasses of fluid daily), limit caffeine, avoid cheese products, increase dietary fiber, increase activity and exercise as tolerated and relax regularly and enjoy meals   -walking/stretching exercises as advised       Current Outpatient Medications   Medication Sig Dispense Refill    ibuprofen (ADVIL;MOTRIN) 800 MG tablet Take 1 tablet by mouth 2 times daily as needed for Pain 60 tablet 1    celecoxib (CELEBREX) 200 MG capsule Take 1 capsule by mouth daily 30 capsule 0    albuterol sulfate HFA (VENTOLIN HFA) 108 (90 Base) MCG/ACT inhaler Inhale 2 puffs into the lungs every 6 hours as needed for Wheezing 1 Inhaler 0    Fluticasone Propionate (FLONASE NA) by Nasal route      melatonin 3 MG TABS tablet Take 3 mg by mouth nightly       No current facility-administered medications for this visit. I will continue her current medication regimen  which is part of the above treatment schedule. It has been helping with Ms. 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 Risks and benefits of the medications and other alternative treatments  including no treatment were discussed with the patient. The common side effects of these medications were also explained to the patient. Informed verbal consent was obtained. Goals of current treatment regimen include improvement in pain, restoration of functioning- with focus on improvement in physical performance, general activity, work or disability,emotional distress, health care utilization and  decreased medication consumption. Will continue to monitor progress towards achieving/maintaining therapeutic goals with special emphasis on  1. Improvement in perceived interfernce  of pain with ADL's. Ability to do home exercises independently. Ability to do household chores indoor and/or outdoor work and social and leisure activities. Improve psychosocial and physical functioning. - she is showing progression towards this treatment goal with the current regimen. She was advised against drinking alcohol with the narcotic pain medicines, advised against driving or handling machinery while adjusting the dose of medicines or if having cognitive  issues related to the current medications. Risk of overdose and death, if medicines not taken as prescribed, were also discussed.  If the patient develops new symptoms or if the symptoms worsen, the patient should call the office. While transcribing every attempt was made to maintain the accuracy of the note in terms of it's contents,there may have been some errors made inadvertently. Thank you for allowing me to participate in the care of this patient.     Kary Dahl MD.    Cc: MAGNOLIA Sanchez - SHELBY

## 2020-10-19 ENCOUNTER — VIRTUAL VISIT (OUTPATIENT)
Dept: PAIN MANAGEMENT | Age: 55
End: 2020-10-19
Payer: COMMERCIAL

## 2020-10-19 PROCEDURE — 99213 OFFICE O/P EST LOW 20 MIN: CPT | Performed by: INTERNAL MEDICINE

## 2020-10-19 RX ORDER — TRAMADOL HYDROCHLORIDE 50 MG/1
50 TABLET ORAL EVERY 6 HOURS PRN
Qty: 70 TABLET | Refills: 0 | Status: SHIPPED | OUTPATIENT
Start: 2020-10-19 | End: 2020-11-16 | Stop reason: SDUPTHER

## 2020-10-19 RX ORDER — IBUPROFEN 800 MG/1
800 TABLET ORAL 2 TIMES DAILY PRN
Qty: 60 TABLET | Refills: 1 | Status: SHIPPED | OUTPATIENT
Start: 2020-10-19 | End: 2020-11-16 | Stop reason: SDUPTHER

## 2020-10-19 NOTE — PROGRESS NOTES
TELE HEALTH VISIT (AUDIO-VISUAL)    Pursuant to the emergency declaration under the [facility], [facility] waiver authority and the [employer] Act, this Virtual  Visit was conducted, with patient's/legal guardian's consent, to reduce the patient's risk of exposure to COVID-19 and provide continuity of care for an established patient. Service is  provided through a video synchronous discussion virtually to substitute for in-person clinic visit. Due to this being a TeleHealth encounter (During OVGXQ-68 public health emergency), evaluation of the following organ systems was limited: Vitals/Constitutional/EENT/Resp/CV/GI//MS/Neuro/Skin/Srsr-Xsnx-Ziq. Sravanthi Rockford  1965  <[id number]>    Ms. Darian Flores is being seen virtually for a follow up visit using one of the following techniques  Google Duo  Informed verbal consent to the virtual visit was obtained from Ms. Darian Flores. Risks associated with HIPPA compliance with the virtual visit was explained to the patient. Ms. Darian Flores is at her residence and Dr. Margarito Cornejo is in his office. HISTORY OF PRESENT ILLNESS:  Ms. Darian Flores is a 63 y.o. female  being assessed for a follow up visit for pain management for evaluation of ongoing care regarding her symptoms and monitoring of compliance with long term use high risk medications. She has a diagnosis of   1. Chronic pain syndrome    2. 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 She complains of pain in the bilateral legs  with radiation to the hips Bilateral, knees Bilateral, ankles Bilateral and feet Bilateral . She rates the pain 7/10 and describes it as aching, throbbing. Current treatment regimen has helped relieve about 50% of the pain. She denies any side effects from the current pain regimen.  Patient reports that since the last follow up visit the physical functioning is unchanged, family/social relationships are unchanged, mood is unchanged sleep patterns are unchanged, and that the overall functioning is unchanged. Patient denies misusing/abusing her narcotic pain medications or using any illegal drugs. There are No indicators for possible drug abuse, addiction or diversion problems. Ms. Trey Lozoya states she has been doing fair, she says she is sore and in pain. Patient says her back and legs hurt the most. She complains of increased pain with changes in weather. Extreme temperatures- cold and damp weather causes increased pain. She states she is working nights. Patient mentions she is using Motrin along with Ultram. Patient denies any side effects with the medications. She reports she is managing her ADL's. ALLERGIES: Patients list of allergies were reviewed     MEDICATIONS: Ms. Trey Lozoya list of medications were reviewed. Her current medications are   Outpatient Medications Prior to Visit   Medication Sig Dispense Refill    traMADol (ULTRAM) 50 MG tablet Take 1 tablet by mouth every 6 hours as needed for Pain (Max 2-3 per day) for up to 28 days. 70 tablet 0    ibuprofen (ADVIL;MOTRIN) 800 MG tablet Take 1 tablet by mouth 2 times daily as needed for Pain 60 tablet 1    albuterol sulfate HFA (VENTOLIN HFA) 108 (90 Base) MCG/ACT inhaler Inhale 2 puffs into the lungs every 6 hours as needed for Wheezing 1 Inhaler 0    Fluticasone Propionate (FLONASE NA) by Nasal route      melatonin 3 MG TABS tablet Take 3 mg by mouth nightly       No facility-administered medications prior to visit. SOCIAL/FAMILY/PAST MEDICAL HISTORY: Ms. Radha Ceballos, family and past medical history was reviewed. REVIEW OF SYSTEMS:    Respiratory: Negative for apnea, chest tightness and shortness of breath or change in baseline breathing. Gastrointestinal: Negative for nausea, vomiting, abdominal pain, diarrhea, constipation, blood in stool and abdominal distention. PHYSICAL EXAM:   Nursing note and vitals per patient reviewed. There were no vitals taken for this visit. Constitutional: She appears well-developed and well-nourished. No acute distress. No respiratory distress. Skin: Skin appears to be warm and dry. No rashes or any other marks noted. She is not diaphoretic. Respiratory/Pulmonary: NO conversational dyspnea, no accessory muscle use, no coughing during exam. She does not appear to be in labored breathing. Neurological/Psychiatric:She is alert and oriented to person, place, and time. Coordination is  normal.  Her mood isAppropriate and affect is Neutral/Euthymic(normal). Musculoskeletal / Extremities: Range of motion is normal. Gait is normal, assistive devices use: none. IMPRESSION:   1. Chronic pain syndrome    2. 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 Primary localized osteoarthrosis of right lower leg        PLAN:  Informed verbal consent regarding treatment was obtained  -[place] record was obtained and reviewed  for the last one year and no indicators of drug misuse  were found. Any other controlled substance prescriptions  seen on the record have been accounted for, I am aware of the patient receiving these medications. Dylan Hein [place] record will be rechecked as part of office protocol.    -continue with current opioid regimen Ultram 2-3 per day  -walking/stretching exercises as advised    -continue with Ibuprofen PRN     Current Outpatient Medications   Medication Sig Dispense Refill    traMADol (ULTRAM) 50 MG tablet Take 1 tablet by mouth every 6 hours as needed for Pain (Max 2-3 per day) for up to 28 days.  70 tablet 0    ibuprofen (ADVIL;MOTRIN) 800 MG tablet Take 1 tablet by mouth 2 times daily as needed for Pain 60 tablet 1    albuterol sulfate HFA (VENTOLIN HFA) 108 (90 Base) MCG/ACT inhaler Inhale 2 puffs into the lungs every 6 hours as needed for Wheezing 1 Inhaler 0    Fluticasone Propionate (FLONASE NA) by Nasal route      melatonin 3 MG TABS tablet Take 3 mg by mouth nightly       No current facility-administered medications for this visit. I will continue her current medication regimen  which is part of the above treatment schedule. It has been helping with Ms. 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 Risks and benefits of the medications and other alternative treatments  including no treatment were discussed with the patient. The common side effects of these medications were also explained to the patient. Informed verbal consent was obtained. Goals of current treatment regimen include improvement in pain, restoration of functioning- with focus on improvement in physical performance, general activity, work or disability,emotional distress, health care utilization and  decreased medication consumption. Will continue to monitor progress towards achieving/maintaining therapeutic goals with special emphasis on  1. Improvement in perceived interfernce  of pain with ADL's. Ability to do home exercises independently. Ability to do household chores indoor and/or outdoor work and social and leisure activities. Improve psychosocial and physical functioning. - she is showing progression towards this treatment goal with the current regimen. She was advised against drinking alcohol with the narcotic pain medicines, advised against driving or handling machinery while adjusting the dose of medicines or if having cognitive  issues related to the current medications. Risk of overdose and death, if medicines not taken as prescribed, were also discussed. If the patient develops new symptoms or if the symptoms worsen, the patient should call the office. While transcribing every attempt was made to maintain the accuracy of the note in terms of it's contents,there may have been some errors made inadvertently.   Thank you for allowing me to participate in the care of this patient.     Barbie Da Silva MD.    Cc: MAGNOLIA Jackson - CNP

## 2020-10-31 ENCOUNTER — HOSPITAL ENCOUNTER (EMERGENCY)
Age: 55
Discharge: HOME | End: 2020-10-31
Payer: COMMERCIAL

## 2020-10-31 VITALS
HEIGHT: 68 IN | SYSTOLIC BLOOD PRESSURE: 177 MMHG | BODY MASS INDEX: 33.11 KG/M2 | OXYGEN SATURATION: 100 % | RESPIRATION RATE: 18 BRPM | DIASTOLIC BLOOD PRESSURE: 101 MMHG | TEMPERATURE: 98.6 F | HEART RATE: 71 BPM | WEIGHT: 218.48 LBS

## 2020-10-31 PROCEDURE — 99282 EMERGENCY DEPT VISIT SF MDM: CPT

## 2020-10-31 RX ORDER — METHYLPREDNISOLONE 4 MG/1
TABLET ORAL
Qty: 1 KIT | Refills: 0 | Status: SHIPPED | OUTPATIENT
Start: 2020-10-31 | End: 2020-11-16

## 2020-10-31 RX ORDER — HYDROCODONE BITARTRATE AND ACETAMINOPHEN 5; 325 MG/1; MG/1
1 TABLET ORAL EVERY 6 HOURS PRN
Qty: 12 TABLET | Refills: 0 | Status: SHIPPED | OUTPATIENT
Start: 2020-10-31 | End: 2020-11-03

## 2020-10-31 RX ORDER — CYCLOBENZAPRINE HCL 10 MG
10 TABLET ORAL 3 TIMES DAILY PRN
Qty: 21 TABLET | Refills: 0 | Status: SHIPPED | OUTPATIENT
Start: 2020-10-31 | End: 2020-11-07

## 2020-10-31 ASSESSMENT — PAIN - FUNCTIONAL ASSESSMENT
PAIN_FUNCTIONAL_ASSESSMENT: 0-10
PAIN_FUNCTIONAL_ASSESSMENT: PREVENTS OR INTERFERES WITH MANY ACTIVE NOT PASSIVE ACTIVITIES

## 2020-10-31 ASSESSMENT — PAIN SCALES - GENERAL
PAINLEVEL_OUTOF10: 8
PAINLEVEL_OUTOF10: 9

## 2020-10-31 ASSESSMENT — PAIN DESCRIPTION - FREQUENCY: FREQUENCY: CONTINUOUS

## 2020-10-31 ASSESSMENT — PAIN DESCRIPTION - ORIENTATION: ORIENTATION: LOWER

## 2020-10-31 ASSESSMENT — PAIN DESCRIPTION - PAIN TYPE: TYPE: ACUTE PAIN

## 2020-10-31 ASSESSMENT — PAIN DESCRIPTION - LOCATION: LOCATION: BACK

## 2020-10-31 ASSESSMENT — PAIN DESCRIPTION - PROGRESSION: CLINICAL_PROGRESSION: GRADUALLY WORSENING

## 2020-11-01 NOTE — ED PROVIDER NOTES
HYDROcodone-acetaminophen (NORCO) 5-325 MG per tablet Take 1 tablet by mouth every 6 hours as needed for Pain for up to 3 days. 12 tablet 0    cyclobenzaprine (FLEXERIL) 10 MG tablet Take 1 tablet by mouth 3 times daily as needed for Muscle spasms 21 tablet 0    traMADol (ULTRAM) 50 MG tablet Take 1 tablet by mouth every 6 hours as needed for Pain (Max 2-3 per day) for up to 28 days.  70 tablet 0    ibuprofen (ADVIL;MOTRIN) 800 MG tablet Take 1 tablet by mouth 2 times daily as needed for Pain 60 tablet 1    albuterol sulfate HFA (VENTOLIN HFA) 108 (90 Base) MCG/ACT inhaler Inhale 2 puffs into the lungs every 6 hours as needed for Wheezing 1 Inhaler 0    Fluticasone Propionate (FLONASE NA) by Nasal route      melatonin 3 MG TABS tablet Take 3 mg by mouth nightly         ALLERGIES    Allergies   Allergen Reactions    Garlic      Respiratory Distress    Penicillins Nausea Only     weak    Demerol Hcl [Meperidine] Nausea And Vomiting and Itching     weak    Erythromycin Nausea Only and Nausea And Vomiting       SOCIAL HISTORY    Social History     Socioeconomic History    Marital status: Single     Spouse name: Not on file    Number of children: 0    Years of education: Not on file    Highest education level: Not on file   Occupational History    Occupation: [employer]    Social Needs    Financial resource strain: Somewhat hard    Food insecurity     Worry: Often true     Inability: Often true    Transportation needs     Medical: No     Non-medical: No   Tobacco Use    Smoking status: Never Smoker    Smokeless tobacco: Never Used   Substance and Sexual Activity    Alcohol use: No     Alcohol/week: 0.0 standard drinks    Drug use: No    Sexual activity: Not Currently     Partners: Male   Lifestyle    Physical activity     Days per week: Not on file     Minutes per session: Not on file    Stress: Not on file   Relationships    Social connections     Talks on phone: Not on file     Gets together: Not on file     Attends Bahai service: Not on file     Active member of club or organization: Not on file     Attends meetings of clubs or organizations: Not on file     Relationship status: Not on file    Intimate partner violence     Fear of current or ex partner: No     Emotionally abused: No     Physically abused: No     Forced sexual activity: No   Other Topics Concern    Not on file   Social History Narrative    Lives alone       PHYSICAL EXAM    VITAL SIGNS: BP (!) 177/101   Pulse 71   Temp 98.6 °F (37 °C) (Temporal)   Resp 18   Ht 5' 8\" (1.727 m)   Wt 218 lb 7.6 oz (99.1 kg)   SpO2 100%   BMI 33.22 kg/m²    Constitutional:  Well developed, well nourished, no acute distress, non-toxic appearing  HENT:  Atraumatic, moist mucus membranes  Neck: No JVD, supple   Respiratory:  No respiratory distress, normal breath sounds  Cardiovascular:  regular rate, no murmurs  GI:  Soft, nontender, no pulsatile masses or bruits of the abdomen  Musculoskeletal:  No edema, no acute deformities    Back: + right lumbar paraspinous tenderness to palpation, no bony midline tenderness, no CVA tenderness  Integument:  Well hydrated, no rash  Vascular: Dorsalis pedis pulses are 2+ and equal bilaterally  Neurologic:  Motor testing reveals: intact thigh adduction, knee flexion and extension, ankle dorsiflexion, toe plantarflexion, and great toe dorsiflexion bilaterally, patellar reflexes are 2+ and equal bilaterally, sensation to light touch is intact in the groin and lower extremities bilaterally    RADIOLOGY  No orders to display       ED COURSE & MEDICAL DECISION MAKING    Please see EMR for medications administered in the ED.     Differential Diagnosis: Spinal Epidural Abscess, Vertebral Osteomyelitis, Cauda Equina Syndrome, Spinal Cord Compression, Conus Medullaris Syndrome, ruptured/dissecting Abdominal Aortic Aneurysm, Metastases to the back, Herpes Zoster, Kidney Stone, Pyelonephritis, Fracture or dislocation, other    Patient seen and examined today for atraumatic right low back pain. See HPI for patient presentation. Patient is hemodynamically stable, nontoxic, afebrile, and without tachycardia, tachypnea, and hypoxia. Physical exam as above. 48-year-old sitting in a chair in no acute distress. Reproducible tenderness to the right paralumbar spine. No reproducible midline spine tenderness. Abdomen soft nontender. Lungs CTA bilaterally. Cardiac exam normal.  No neurovascular deficits. She has no history of significant trauma so plain films are not indicated. Should be treated with symptomatic medication and advised to follow back up with her PCP on Monday. Due to the unremarkable history and lack of systemic complaints or atypical features, as well as the absence of neurological deficit, I have low suspicion at this time for epidural compression syndrome or infectious etiology. Patient's pain is most likely muscle strain. I believe the patient is safe for discharge at this time. I'll prescribe symptomatic medications. At this time, the evidence for any other entities in the differential is insufficient to justify any further testing. This was explained to the patient. The patient was advised that persistent or worsening symptoms will require further evaluation. The patient tolerated their visit well. I saw the patient independently with physician available for consultation as needed. The patient and / or the family were informed of the results of any tests, a time was given to answer questions, a plan was proposed and they agreed with plan. FINAL IMPRESSION    1.  Acute right-sided low back pain with right-sided sciatica        PLAN  Discharge with outpatient follow-up (see EMR)      (Please note that this note was completed with a voice recognition program.  Every attempt was made to edit the dictations, but inevitably there remain words that are mis-transcribed.)            Juan Lewis, MAGNOLIA - CNP  10/31/20 2031

## 2020-11-01 NOTE — ED NOTES
Pt discharged home with prescriptions, verbalized understanding of discharge instructions. Denies w/c out, states will use crutch that \"I just can't bend. \" Pt A&OX4 at discharge.       Lisa Brown RN  10/31/20 4690

## 2020-11-05 ENCOUNTER — OFFICE VISIT (OUTPATIENT)
Dept: INTERNAL MEDICINE CLINIC | Age: 55
End: 2020-11-05
Payer: COMMERCIAL

## 2020-11-05 ENCOUNTER — HOSPITAL ENCOUNTER (OUTPATIENT)
Dept: GENERAL RADIOLOGY | Age: 55
Discharge: HOME | End: 2020-11-05
Payer: COMMERCIAL

## 2020-11-05 ENCOUNTER — HOSPITAL ENCOUNTER (OUTPATIENT)
Age: 55
Discharge: HOME | End: 2020-11-05
Payer: COMMERCIAL

## 2020-11-05 VITALS
RESPIRATION RATE: 18 BRPM | WEIGHT: 215.4 LBS | DIASTOLIC BLOOD PRESSURE: 76 MMHG | TEMPERATURE: 96.8 F | BODY MASS INDEX: 32.75 KG/M2 | SYSTOLIC BLOOD PRESSURE: 136 MMHG

## 2020-11-05 PROBLEM — M54.50 ACUTE RIGHT-SIDED LOW BACK PAIN WITHOUT SCIATICA: Status: ACTIVE | Noted: 2020-11-05

## 2020-11-05 PROCEDURE — 1111F DSCHRG MED/CURRENT MED MERGE: CPT | Performed by: FAMILY MEDICINE

## 2020-11-05 PROCEDURE — 72100 X-RAY EXAM L-S SPINE 2/3 VWS: CPT

## 2020-11-05 PROCEDURE — 99214 OFFICE O/P EST MOD 30 MIN: CPT | Performed by: FAMILY MEDICINE

## 2020-11-05 RX ORDER — PREDNISONE 20 MG/1
20 TABLET ORAL DAILY
Qty: 10 TABLET | Refills: 0 | Status: SHIPPED | OUTPATIENT
Start: 2020-11-05 | End: 2020-11-15

## 2020-11-05 RX ORDER — ALBUTEROL SULFATE 90 UG/1
2 AEROSOL, METERED RESPIRATORY (INHALATION) EVERY 6 HOURS PRN
Qty: 1 INHALER | Refills: 0 | Status: SHIPPED | OUTPATIENT
Start: 2020-11-05 | End: 2022-04-26 | Stop reason: SDUPTHER

## 2020-11-05 ASSESSMENT — PATIENT HEALTH QUESTIONNAIRE - PHQ9
SUM OF ALL RESPONSES TO PHQ9 QUESTIONS 1 & 2: 0
SUM OF ALL RESPONSES TO PHQ QUESTIONS 1-9: 0
2. FEELING DOWN, DEPRESSED OR HOPELESS: 0
SUM OF ALL RESPONSES TO PHQ QUESTIONS 1-9: 0
1. LITTLE INTEREST OR PLEASURE IN DOING THINGS: 0
SUM OF ALL RESPONSES TO PHQ QUESTIONS 1-9: 0

## 2020-11-05 ASSESSMENT — ENCOUNTER SYMPTOMS
DIARRHEA: 0
SHORTNESS OF BREATH: 0
BLOOD IN STOOL: 0
BACK PAIN: 1
CONSTIPATION: 0
ABDOMINAL PAIN: 0

## 2020-11-05 NOTE — PROGRESS NOTES
2020     Talia West (:  1965) is a 63 y.o. female, here for evaluation of the following medical concerns:    HPI  Patient is a 63years old female with a history of fibromyalgia and chronic  pain syndrome. About a week ago while at work she bent over to  a 10 pound tray and when she stood up complain of back pain. The pain is  on the right side of the back radiating down to the right hip  She tried the tramadol and ibuprofen as prescribed by the pain management without significant improvement. She presented to emergency room on 2020 and was prescribed Medrol Dosepak and a muscle relaxant. Patient is here for follow-up from ED visit. She reported that she is not getting any better and still have significant pain in the back    Review of Systems   Constitutional: Negative for activity change and appetite change. Eyes: Negative for visual disturbance. Respiratory: Negative for shortness of breath. Cardiovascular: Negative for chest pain and leg swelling. Gastrointestinal: Negative for abdominal pain, blood in stool, constipation and diarrhea. Genitourinary: Negative for difficulty urinating, frequency, hematuria, menstrual problem and urgency. Musculoskeletal: Positive for back pain. Neurological: Negative for dizziness and syncope. Psychiatric/Behavioral: Negative for behavioral problems. Prior to Visit Medications    Medication Sig Taking? Authorizing Provider   albuterol sulfate HFA (VENTOLIN HFA) 108 (90 Base) MCG/ACT inhaler Inhale 2 puffs into the lungs every 6 hours as needed for Wheezing Yes Parish Oropeza MD   predniSONE (DELTASONE) 20 MG tablet Take 1 tablet by mouth daily for 10 days Yes Parish Oropeza MD   methylPREDNISolone (MEDROL, YONATHAN,) 4 MG tablet By mouth.   MAGNOLIA Huynh CNP   cyclobenzaprine (FLEXERIL) 10 MG tablet Take 1 tablet by mouth 3 times daily as needed for Muscle spasms  MAGNOLIA Huynh CNP   traMADol (ULTRAM) 50 MG tablet Take 1 tablet by mouth every 6 hours as needed for Pain (Max 2-3 per day) for up to 28 days. Ambar Aguilar MD   ibuprofen (ADVIL;MOTRIN) 800 MG tablet Take 1 tablet by mouth 2 times daily as needed for Pain  Ambar Aguilar MD   Fluticasone Propionate (FLONASE NA) by Nasal route  Historical Provider, MD   melatonin 3 MG TABS tablet Take 3 mg by mouth nightly  Historical Provider, MD        Social History     Tobacco Use    Smoking status: Never Smoker    Smokeless tobacco: Never Used   Substance Use Topics    Alcohol use: No     Alcohol/week: 0.0 standard drinks        Vitals:    11/05/20 0906   BP: 136/76   Resp: 18   Temp: 96.8 °F (36 °C)   TempSrc: Temporal   Weight: 215 lb 6.4 oz (97.7 kg)     Estimated body mass index is 32.75 kg/m² as calculated from the following:    Height as of 10/31/20: 5' 8\" (1.727 m). Weight as of this encounter: 215 lb 6.4 oz (97.7 kg). Physical Exam  Constitutional:       General: She is not in acute distress. Appearance: She is well-developed. HENT:      Head: Normocephalic. Eyes:      Conjunctiva/sclera: Conjunctivae normal.   Neck:      Musculoskeletal: Neck supple. Thyroid: No thyromegaly. Cardiovascular:      Rate and Rhythm: Normal rate and regular rhythm. Heart sounds: Normal heart sounds. No murmur. Pulmonary:      Effort: No respiratory distress. Breath sounds: Normal breath sounds. No wheezing or rales. Abdominal:      General: There is no distension. Palpations: Abdomen is soft. Musculoskeletal: Normal range of motion. Skin:     General: Skin is warm. Findings: No rash. Neurological:      Mental Status: She is alert and oriented to person, place, and time. Psychiatric:         Behavior: Behavior normal.         ASSESSMENT/PLAN:  1. Acute right-sided low back pain without sciatica  Will order prednisone 20 mg daily for 10 days.   Continue muscle relaxant Flexeril 10 mg 3 times a day ibuprofen 800 mg twice a day and tramadol 50 mg every 6 hours. Will order x-ray of the lumbar spine. Patient can go back to work with the restriction not lifting more than 10 pounds. - XR LUMBAR SPINE (2-3 VIEWS); Future    2. Fibromyalgia  Continue current medicine. Consider Cymbalta or Lyrica    3. Chronic pain syndrome  She goes to pain management c/o Dr Eb Rincon. She takes tramadol and ibuprofen.       Keep up regular appointment    An electronic signature was used to authenticate this note.    --Erasmo Trejo MD on 11/5/2020 at 10:06 AM

## 2020-11-05 NOTE — LETTER
[address] [place] [address]  Phone: [phone]  Fax: [phone]    Margarita Ferguson MD        November 5, 2020     Patient: Roman Hollis   YOB: 1965   Date of Visit: 11/5/2020       To Whom It May Concern: It is my medical opinion that Roman Hollis may return to work on Saturday, 11/07/2020 with the following restrictions: lifting/carrying not to exceed 20 lbs. .    If you have any questions or concerns, please don't hesitate to call.     Sincerely,        Margarita Ferguson MD

## 2020-11-11 ENCOUNTER — TELEPHONE (OUTPATIENT)
Dept: EMERGENCY DEPT | Age: 55
End: 2020-11-11

## 2020-11-11 NOTE — TELEPHONE ENCOUNTER
[facility] ([facility]) reached out to patient to complete wellness check. However, the patient did not answer the phone. [facility] will try to reach out to patient again.

## 2020-11-16 ENCOUNTER — VIRTUAL VISIT (OUTPATIENT)
Dept: PAIN MANAGEMENT | Age: 55
End: 2020-11-16
Payer: COMMERCIAL

## 2020-11-16 PROCEDURE — 99213 OFFICE O/P EST LOW 20 MIN: CPT | Performed by: INTERNAL MEDICINE

## 2020-11-16 RX ORDER — TRAMADOL HYDROCHLORIDE 50 MG/1
50 TABLET ORAL EVERY 6 HOURS PRN
Qty: 90 TABLET | Refills: 0 | Status: SHIPPED | OUTPATIENT
Start: 2020-11-16 | End: 2020-12-22 | Stop reason: SDUPTHER

## 2020-11-16 RX ORDER — IBUPROFEN 800 MG/1
800 TABLET ORAL 2 TIMES DAILY PRN
Qty: 60 TABLET | Refills: 1 | Status: SHIPPED | OUTPATIENT
Start: 2020-11-16 | End: 2020-12-22 | Stop reason: SDUPTHER

## 2020-11-16 NOTE — PROGRESS NOTES
TELE HEALTH VISIT (AUDIO-VISUAL)    Pursuant to the emergency declaration under the [facility], [facility] waiver authority and the [employer] Act, this Virtual  Visit was conducted, with patient's/legal guardian's consent, to reduce the patient's risk of exposure to COVID-19 and provide continuity of care for an established patient. Service is  provided through a video synchronous discussion virtually to substitute for in-person clinic visit. Due to this being a TeleHealth encounter (During EVQRH-51 public health emergency), evaluation of the following organ systems was limited: Vitals/Constitutional/EENT/Resp/CV/GI//MS/Neuro/Skin/Vrub-Jxud-Fgh. [facility]  1965  <[id number]>    Ms. Edmar Solorio is being seen virtually for a follow up visit using one of the following techniques  Doxy. me   Informed verbal consent to the virtual visit was obtained from Ms. Edmar Solorio. Risks associated with HIPPA compliance with the virtual visit was explained to the patient. Ms. Edmar Solorio is at her residence and Dr. Allan Enriquez is in his office. HISTORY OF PRESENT ILLNESS:  Ms. Edmar Solorio is a 63 y.o. female  being assessed for a follow up visit for pain management for evaluation of ongoing care regarding her symptoms and monitoring of compliance with long term use high risk medications. She has a diagnosis of   1. Chronic pain syndrome    2. 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 Primary localized osteoarthrosis of right lower leg    . She complains of pain in the Low back   with radiation to the buttocks, hips Bilateral, upper leg Bilateral, knees Bilateral, lower leg Bilateral, ankles Bilateral and feet Bilateral . She rates the pain 8/10 and describes it as sharp, aching. Current treatment regimen has helped relieve about 70% of the pain. She denies any side effects from the current pain regimen.  Patient reports that since the last follow up visit the physical functioning is unchanged, family/social relationships are unchanged, mood is unchanged sleep patterns are unchanged, and that the overall functioning is unchanged. Patient denies misusing/abusing her narcotic pain medications or using any illegal drugs. There are No indicators for possible drug abuse, addiction or diversion problems. Patient states she has been doing fair. She mentions she has to go to the ER because her back has locked up at work. She says she was on crutches for  A week. She complains she has pain going into the right leg and it feels like something laying on the nerve. She mentions she was given Vicodin, but was not much help, but is getting better. She reports it hurts to bend and twist when she returned to work. ALLERGIES: Patients list of allergies were reviewed     MEDICATIONS: Ms. Vallecillo Sender list of medications were reviewed. Her current medications are   Outpatient Medications Prior to Visit   Medication Sig Dispense Refill    albuterol sulfate HFA (VENTOLIN HFA) 108 (90 Base) MCG/ACT inhaler Inhale 2 puffs into the lungs every 6 hours as needed for Wheezing 1 Inhaler 0    methylPREDNISolone (MEDROL, YONATHAN,) 4 MG tablet By mouth. 1 kit 0    traMADol (ULTRAM) 50 MG tablet Take 1 tablet by mouth every 6 hours as needed for Pain (Max 2-3 per day) for up to 28 days. 70 tablet 0    ibuprofen (ADVIL;MOTRIN) 800 MG tablet Take 1 tablet by mouth 2 times daily as needed for Pain 60 tablet 1    Fluticasone Propionate (FLONASE NA) by Nasal route      melatonin 3 MG TABS tablet Take 3 mg by mouth nightly       No facility-administered medications prior to visit. SOCIAL/FAMILY/PAST MEDICAL HISTORY: Ms. Rushing Come, family and past medical history was reviewed. REVIEW OF SYSTEMS:    Respiratory: Negative for apnea, chest tightness and shortness of breath or change in baseline breathing.     Gastrointestinal: Negative for nausea, vomiting, Current Outpatient Medications   Medication Sig Dispense Refill    albuterol sulfate HFA (VENTOLIN HFA) 108 (90 Base) MCG/ACT inhaler Inhale 2 puffs into the lungs every 6 hours as needed for Wheezing 1 Inhaler 0    methylPREDNISolone (MEDROL, YONATHAN,) 4 MG tablet By mouth. 1 kit 0    traMADol (ULTRAM) 50 MG tablet Take 1 tablet by mouth every 6 hours as needed for Pain (Max 2-3 per day) for up to 28 days. 70 tablet 0    ibuprofen (ADVIL;MOTRIN) 800 MG tablet Take 1 tablet by mouth 2 times daily as needed for Pain 60 tablet 1    Fluticasone Propionate (FLONASE NA) by Nasal route      melatonin 3 MG TABS tablet Take 3 mg by mouth nightly       No current facility-administered medications for this visit. I will continue her current medication regimen  which is part of the above treatment schedule. It has been helping with Ms. 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 Risks and benefits of the medications and other alternative treatments  including no treatment were discussed with the patient. The common side effects of these medications were also explained to the patient. Informed verbal consent was obtained. Goals of current treatment regimen include improvement in pain, restoration of functioning- with focus on improvement in physical performance, general activity, work or disability,emotional distress, health care utilization and  decreased medication consumption. Will continue to monitor progress towards achieving/maintaining therapeutic goals with special emphasis on  1. Improvement in perceived interfernce  of pain with ADL's. Ability to do home exercises independently. Ability to do household chores indoor and/or outdoor work and social and leisure activities. Improve psychosocial and physical

## 2020-12-22 ENCOUNTER — OFFICE VISIT (OUTPATIENT)
Dept: PAIN MANAGEMENT | Age: 55
End: 2020-12-22
Payer: COMMERCIAL

## 2020-12-22 VITALS
DIASTOLIC BLOOD PRESSURE: 87 MMHG | HEART RATE: 81 BPM | TEMPERATURE: 98.5 F | BODY MASS INDEX: 33.45 KG/M2 | SYSTOLIC BLOOD PRESSURE: 152 MMHG | WEIGHT: 220 LBS

## 2020-12-22 PROCEDURE — 99213 OFFICE O/P EST LOW 20 MIN: CPT | Performed by: INTERNAL MEDICINE

## 2020-12-22 RX ORDER — IBUPROFEN 800 MG/1
800 TABLET ORAL 2 TIMES DAILY PRN
Qty: 60 TABLET | Refills: 1 | Status: SHIPPED | OUTPATIENT
Start: 2020-12-22 | End: 2021-03-30

## 2020-12-22 RX ORDER — TRAMADOL HYDROCHLORIDE 50 MG/1
50 TABLET ORAL EVERY 6 HOURS PRN
Qty: 90 TABLET | Refills: 0 | Status: SHIPPED | OUTPATIENT
Start: 2020-12-22 | End: 2021-01-26 | Stop reason: SDUPTHER

## 2020-12-22 NOTE — PROGRESS NOTES
Agnieszka Alexx  1965  <[id number]>      HISTORY OF PRESENT ILLNESS:  Ms. Arturo Howell is a 63 y.o. female returns for a follow up visit for pain management  She has a diagnosis of No diagnosis found. .      She complains of pain in the lower back, bilateral wrist, bilateral knees She rates the pain 7/10 and describes it as aching, burning. Current treatment regimen has helped relieve about 60% of the pain. She denies any side effects from the current pain regimen. Patient reports that since the last follow up visit the physical functioning is unchanged, family/social relationships are unchanged, mood is unchanged sleep patterns are unchanged, and that the overall functioning is unchanged. Patient denies misusing/abusing her narcotic pain medications or using any illegal drugs. There are No indicators for possible drug abuse, addiction or diversion problems. Ms. Arturo Howell states she has been doing fair. Patient mentions she is using Tramadol 2-3 per day along with Ibuprofen. Patient denies any constipation symptoms. Patient reports she is working 40 hours per week, she is standing mostly at work and is working night shift. Patient reports her weight has been stable. She is complaining of her legs hurting worse than her back. ALLERGIES: Patients list of allergies were reviewed     MEDICATIONS: Ms. Arturo Howell list of medications were reviewed. Her current medications are   Outpatient Medications Prior to Visit   Medication Sig Dispense Refill    ibuprofen (ADVIL;MOTRIN) 800 MG tablet Take 1 tablet by mouth 2 times daily as needed for Pain 60 tablet 1    albuterol sulfate HFA (VENTOLIN HFA) 108 (90 Base) MCG/ACT inhaler Inhale 2 puffs into the lungs every 6 hours as needed for Wheezing 1 Inhaler 0    Fluticasone Propionate (FLONASE NA) by Nasal route      melatonin 3 MG TABS tablet Take 3 mg by mouth nightly       No facility-administered medications prior to visit.          REVIEW OF SYSTEMS: Respiratory: Negative for apnea, chest tightness and shortness of breath or change in baseline breathing. PHYSICAL EXAM:   Nursing note and vitals reviewed. BP (!) 152/87   Pulse 81   Temp 98.5 °F (36.9 °C) (Infrared)   Wt 220 lb (99.8 kg)   BMI 33.45 kg/m²   Constitutional: She appears well-developed and well-nourished. No acute distress. Cardiovascular: Normal rate, regular rhythm, normal heart sounds, and does not have murmur. Pulmonary/Chest: Effort normal. No respiratory distress. She does not have wheezes in the lung fields. She has no rales. Neurological/Psychiatric:She is alert and oriented to person, place, and time. Coordination is  normal.  Her mood isAppropriate and affect is Anxious . Her    IMPRESSION:   1. Chronic pain syndrome    2. 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 Primary localized osteoarthrosis of right lower leg        PLAN:  Informed verbal consent was obtained  -[place] record was obtained and reviewed  for the last one year and no indicators of drug misuse  were found. Any other controlled substance prescriptions  seen on the record have been accounted for, I am aware of the patient receiving these medications. Laci Salinas  [place] record will be rechecked as part of office protocol.    -She was advised weight reduction, diet changes- 800-1200 matthew diet, diet diary, exercising, nutritional  consult increased physical activity as tolerated   -She was advised to increase fluids ( 5-7  glasses of fluid daily), limit caffeine, avoid cheese products, increase dietary fiber, increase activity and exercise as tolerated and relax regularly and enjoy meals   -continue with Ultram 2-3 per day and Motrin PRN  -ROM/Stretching exercises as advised   -Urine drug screen with GC/MS for opiates and drugs of abuse was ordered and will follow up on results.   -labs still pending, advised to get done     Current Outpatient Medications   Medication Sig Dispense Refill Sit 30-60 minutes  Without having to stand up frequently. - she is maintaining/progressing towards her work related goals with the current regimen. She was advised against drinking alcohol with the narcotic pain medicines, advised against driving or handling machinery while adjusting the dose of medicines or if having cognitive  issues related to the current medications. Risk of overdose and death, if medicines not taken as prescribed, were also discussed. If the patient develops new symptoms or if the symptoms worsen, the patient should call the office. While transcribing every attempt was made to maintain the accuracy of the note in terms of it's contents,there may have been some errors made inadvertently. Thank you for allowing me to participate in the care of this patient.     Tony Quinones MD.    Cc: MAGNOLIA Hassan - CNP

## 2021-01-26 ENCOUNTER — VIRTUAL VISIT (OUTPATIENT)
Dept: PAIN MANAGEMENT | Age: 56
End: 2021-01-26
Payer: COMMERCIAL

## 2021-01-26 DIAGNOSIS — G89.4 CHRONIC PAIN SYNDROME: ICD-10-CM

## 2021-01-26 DIAGNOSIS — T84.84XS PAIN IN PROSTHETIC JOINT, SEQUELA: ICD-10-CM

## 2021-01-26 DIAGNOSIS — M17.11 PRIMARY LOCALIZED OSTEOARTHROSIS OF RIGHT LOWER LEG: ICD-10-CM

## 2021-01-26 DIAGNOSIS — M79.7 FIBROMYALGIA: ICD-10-CM

## 2021-01-26 PROCEDURE — 99213 OFFICE O/P EST LOW 20 MIN: CPT | Performed by: INTERNAL MEDICINE

## 2021-01-26 RX ORDER — TRAMADOL HYDROCHLORIDE 50 MG/1
50 TABLET ORAL EVERY 6 HOURS PRN
Qty: 90 TABLET | Refills: 0 | Status: SHIPPED | OUTPATIENT
Start: 2021-01-26 | End: 2021-03-02 | Stop reason: SDUPTHER

## 2021-01-26 NOTE — PROGRESS NOTES
TELE HEALTH VISIT (AUDIO-VISUAL)    Pursuant to the emergency declaration under the [facility], [facility] waiver authority and the [employer] Act, this Virtual  Visit was conducted, with patient's/legal guardian's consent, to reduce the patient's risk of exposure to COVID-19 and provide continuity of care for an established patient. Service is  provided through a video synchronous discussion virtually to substitute for in-person clinic visit. Due to this being a TeleHealth encounter (During IDYBY-79 public health emergency), evaluation of the following organ systems was limited: Vitals/Constitutional/EENT/Resp/CV/GI//MS/Neuro/Skin/Fjya-Cqas-Oxz. [place]  1965  <[id number]>    Ms. Phoenix Wild is being seen virtually for a follow up visit using one of the following techniques   Doxy. me  Informed verbal consent to the virtual visit was obtained from Ms. Phoenix Wild. Risks associated with HIPPA compliance with the virtual visit was explained to the patient. Ms. Phoenix Wild is at her residence and Dr. Bernadine Sam is in his office. HISTORY OF PRESENT ILLNESS:  Ms. Phoenix Wild is a 63 y.o. female returns for a follow up visit for multiple medical problems. Her current presenting problems are   1. Chronic pain syndrome    2. 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 Primary localized osteoarthrosis of right lower leg    . As per information/history obtained from the PADT(patient assessment and documentation tool) - She complains of pain in the lower back with radiation to the upper leg Bilateral, knees Bilateral and lower leg Bilateral She rates the pain 5/10 and describes it as cramping. Pain is made worse by: walking, standing. Current treatment regimen has helped relieve about 80% of the pain. She denies side effects from the current pain regimen. Patient reports that since the last follow up visit the physical functioning is unchanged, family/social relationships are unchanged, mood is unchanged and sleep patterns are unchanged, and that the overall functioning is unchanged. Patient denies neurological bowel or bladder. Patient denies misusing/abusing her narcotic pain medications or using any illegal drugs. There are No indicators for possible drug abuse, addiction or diversion problems. Upon obtaining the medical history from Ms. Beatriz Avila regarding today's office visit for her presenting problems, Ms. Beatriz Avila states she has been doing fair. She says her leg is hurting pretty bad, right is worse than her left. Patient mentions she is using Tramadol along with Motrin. Patient reports she is working full time still. Patient denies any constipation symptoms. ALLERGIES: Patients list of allergies were reviewed     MEDICATIONS: Ms. Beatriz Avila list of medications were reviewed. Her current medications are   Outpatient Medications Prior to Visit   Medication Sig Dispense Refill    traMADol (ULTRAM) 50 MG tablet Take 1 tablet by mouth every 6 hours as needed for Pain (Max 2-3 per day) for up to 35 days.  90 tablet 0    albuterol sulfate HFA (VENTOLIN HFA) 108 (90 Base) MCG/ACT inhaler Inhale 2 puffs into the lungs every 6 hours as needed for Wheezing 1 Inhaler 0    Fluticasone Propionate (FLONASE NA) by Nasal route      melatonin 3 MG TABS tablet Take 3 mg by mouth nightly  ibuprofen (ADVIL;MOTRIN) 800 MG tablet Take 1 tablet by mouth 2 times daily as needed for Pain 60 tablet 1     No facility-administered medications prior to visit. REVIEW OF SYSTEMS:    Respiratory: Negative for apnea, chest tightness and shortness of breath or change in baseline breathing. PHYSICAL EXAM:   Nursing note and vitals reviewed. There were no vitals taken for this visit. Constitutional: She appears well-developed and well-nourished. No acute distress. Cardiovascular: Normal rate, regular rhythm, normal heart sounds, and does not have murmur. Pulmonary/Chest: Effort normal. No respiratory distress. She does not have wheezes in the lung fields. She has no rales. Neurological/Psychiatric:She is alert and oriented to person, place, and time. Coordination is  normal.  Her mood isAppropriate and affect is Neutral/Euthymic(normal) . Her    IMPRESSION:   1. Chronic pain syndrome    2. 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 Primary localized osteoarthrosis of right lower leg        PLAN:  Informed verbal consent was obtained  -[place] record was obtained and reviewed  for the last one year and no indicators of drug misuse  were found. Any other controlled substance prescriptions  seen on the record have been accounted for, I am aware of the patient receiving these medications. Olga Boone [place] record will be rechecked as part of office protocol.    -Patient's urine drug screen results with GC/MS confirmation were obtained and reviewed and were negative for any illicit drugs.  Prescribed medications were within acceptable range.   -She was advised to increase fluids ( 5-7  glasses of fluid daily), limit caffeine, avoid cheese products, increase dietary fiber, increase activity and exercise as tolerated and relax regularly and enjoy meals   -continue with Ultram 2-3 per day  -ROM/Stretching exercises as advised      Current Outpatient Medications   Medication Sig Dispense Refill  traMADol (ULTRAM) 50 MG tablet Take 1 tablet by mouth every 6 hours as needed for Pain (Max 2-3 per day) for up to 35 days. 90 tablet 0    albuterol sulfate HFA (VENTOLIN HFA) 108 (90 Base) MCG/ACT inhaler Inhale 2 puffs into the lungs every 6 hours as needed for Wheezing 1 Inhaler 0    Fluticasone Propionate (FLONASE NA) by Nasal route      melatonin 3 MG TABS tablet Take 3 mg by mouth nightly      ibuprofen (ADVIL;MOTRIN) 800 MG tablet Take 1 tablet by mouth 2 times daily as needed for Pain 60 tablet 1     No current facility-administered medications for this visit. I will continue her current medication regimen  which is part of the above treatment schedule. It has been helping with Ms. 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 Risks and benefits of the medications and other alternative treatments  including no treatment were discussed with the patient. The common side effects of these medications were also explained to the patient. Informed verbal consent was obtained. Goals of current treatment regimen include improvement in pain, restoration of functioning- with focus on improvement in physical performance, general activity, work or disability,emotional distress, health care utilization and  decreased medication consumption. Will continue to monitor progress towards achieving/maintaining therapeutic goals with special emphasis on  1. Improvement in perceived interfernce  of pain with ADL's. Ability to do home exercises independently. Ability to do household chores indoor and/or outdoor work and social and leisure activities. Improve psychosocial and physical functioning. - she is showing progression towards this treatment goal with the current regimen. She was advised against drinking alcohol with the narcotic pain medicines, advised against driving or handling machinery while adjusting the dose of medicines or if having cognitive  issues related to the current medications. Risk of overdose and death, if medicines not taken as prescribed, were also discussed. If the patient develops new symptoms or if the symptoms worsen, the patient should call the office. While transcribing every attempt was made to maintain the accuracy of the note in terms of it's contents,there may have been some errors made inadvertently. Thank you for allowing me to participate in the care of this patient.     Oracio Jacobsen MD.    Cc: MAGNOLIA Aranda - CNP

## 2021-02-09 ENCOUNTER — OFFICE VISIT (OUTPATIENT)
Dept: PRIMARY CARE CLINIC | Age: 56
End: 2021-02-09
Payer: COMMERCIAL

## 2021-02-09 VITALS
BODY MASS INDEX: 31.72 KG/M2 | TEMPERATURE: 97 F | SYSTOLIC BLOOD PRESSURE: 148 MMHG | DIASTOLIC BLOOD PRESSURE: 90 MMHG | WEIGHT: 208.6 LBS | RESPIRATION RATE: 18 BRPM | HEART RATE: 88 BPM

## 2021-02-09 DIAGNOSIS — M54.50 ACUTE RIGHT-SIDED LOW BACK PAIN WITHOUT SCIATICA: Primary | ICD-10-CM

## 2021-02-09 PROCEDURE — 99212 OFFICE O/P EST SF 10 MIN: CPT | Performed by: NURSE PRACTITIONER

## 2021-02-09 RX ORDER — GABAPENTIN 100 MG/1
100 CAPSULE ORAL 2 TIMES DAILY
Qty: 60 CAPSULE | Refills: 0 | Status: SHIPPED | OUTPATIENT
Start: 2021-02-09 | End: 2021-03-30

## 2021-02-09 RX ORDER — PREDNISONE 10 MG/1
10 TABLET ORAL DAILY
Qty: 10 TABLET | Refills: 0 | Status: SHIPPED | OUTPATIENT
Start: 2021-02-09 | End: 2021-02-19

## 2021-02-09 ASSESSMENT — PATIENT HEALTH QUESTIONNAIRE - PHQ9
SUM OF ALL RESPONSES TO PHQ QUESTIONS 1-9: 0
1. LITTLE INTEREST OR PLEASURE IN DOING THINGS: 0
SUM OF ALL RESPONSES TO PHQ QUESTIONS 1-9: 0

## 2021-02-09 ASSESSMENT — ENCOUNTER SYMPTOMS: BACK PAIN: 1

## 2021-02-09 NOTE — PROGRESS NOTES
[address]  [address]  [address] [facility] [address]  Dept: [phone]       2021     Susu Linda (:  )TG a 63 y.o. female, here for evaluation of the following medical concerns:    Back Pain  This is a chronic problem. Pertinent negatives include no abdominal pain, chest pain, dysuria, fever or headaches. Reports she is receiving care from Pain management since 2020 (Dr. Barbara Dumont). She is being followed by pain management for chronic pain syndrome, fibromyalgia. Was evaluated in the Emergency department in October for acute back pain. History of TKA right knee and continues to have pain. Reports taking medication as prescribed but medication is not effective. Reports she bent over to reach something on Saturday and her lingering pain increased significantly. Reports she has been unable to sleep. She is pacing in the room during exam and sitting for a limited time. Describes the pain as fire radiating down right leg pass her knee. Denies nausea, vomiting, urinary or bowel incontinence. Evaluated and treated in the emergency department in October for sciatica.         Health Care Maintentnance  Vaccines: declined  Lab Results   Component Value Date    CHOL 204 (H) 2019     Lab Results   Component Value Date    TRIG 95 2019     Lab Results   Component Value Date    HDL 73 (H) 2019     Lab Results   Component Value Date    LDLCALC 112 (H) 2019     Lab Results   Component Value Date    LABVLDL 19 2019     No results found for: [facility]  Lab Results   Component Value Date    WBC 6.1 2019    HGB 12.4 2019    HCT 38.5 2019    MCV 82.8 2019     2019     Lab Results   Component Value Date     2019    K 4.6 2019     2019    CO2 24 2019    BUN 11 2019    CREATININE 0.7 2019    GLUCOSE 94 2019    CALCIUM 9.5 2019 PROT 7.2 08/08/2019    LABALBU 4.0 08/08/2019    BILITOT <0.2 08/08/2019    ALKPHOS 67 08/08/2019    AST 15 08/08/2019    ALT 12 08/08/2019    LABGLOM >60 08/08/2019    GFRAA >60 08/08/2019    AGRATIO 1.3 08/08/2019    GLOB 3.2 08/08/2019       Review of Systems   Constitutional: Negative for activity change and fever. HENT: Negative for congestion. Eyes: Negative for visual disturbance. Respiratory: Negative for chest tightness and shortness of breath. Cardiovascular: Negative for chest pain, palpitations and leg swelling. Gastrointestinal: Negative for abdominal pain, constipation and diarrhea. Endocrine: Negative for polyuria. Genitourinary: Negative for dysuria. Musculoskeletal: Positive for back pain. Negative for arthralgias and myalgias. Skin: Negative for rash. Neurological: Negative for dizziness, light-headedness and headaches. Psychiatric/Behavioral: Negative for agitation, decreased concentration and sleep disturbance. The patient is not nervous/anxious. Prior to Visit Medications    Medication Sig Taking? Authorizing Provider   gabapentin (NEURONTIN) 100 MG capsule Take 1 capsule by mouth 2 times daily for 30 days. Intended supply: 30 days Yes MAGNOLIA Lewis CNP   predniSONE (DELTASONE) 10 MG tablet Take 1 tablet by mouth daily for 10 days Yes MAGNOLIA Lewis CNP   traMADol (ULTRAM) 50 MG tablet Take 1 tablet by mouth every 6 hours as needed for Pain (Max 2-3 per day) for up to 35 days.  Yes Cornell Lamas MD   melatonin 3 MG TABS tablet Take 3 mg by mouth nightly Yes Historical Provider, MD   ibuprofen (ADVIL;MOTRIN) 800 MG tablet Take 1 tablet by mouth 2 times daily as needed for Pain  Cornell Lamas MD   albuterol sulfate HFA (VENTOLIN HFA) 108 (90 Base) MCG/ACT inhaler Inhale 2 puffs into the lungs every 6 hours as needed for Wheezing  Janna Gallardo MD   Fluticasone Propionate (FLONASE NA) by Nasal route  Historical Provider, MD Social History     Tobacco Use    Smoking status: Never Smoker    Smokeless tobacco: Never Used   Substance Use Topics    Alcohol use: No     Alcohol/week: 0.0 standard drinks        Vitals:    02/09/21 1409   BP: (!) 148/90   Pulse: 88   Resp: 18   Temp: 97 °F (36.1 °C)   TempSrc: Temporal   Weight: 208 lb 9.6 oz (94.6 kg)     Estimated body mass index is 31.72 kg/m² as calculated from the following:    Height as of 10/31/20: 5' 8\" (1.727 m). Weight as of this encounter: 208 lb 9.6 oz (94.6 kg). Physical Exam  Vitals signs reviewed. Constitutional:       Appearance: She is well-developed. HENT:      Head: Normocephalic. Right Ear: Hearing and external ear normal.      Left Ear: Hearing and external ear normal.      Nose: Nose normal.   Eyes:      General: Lids are normal.   Cardiovascular:      Rate and Rhythm: Normal rate and regular rhythm. Heart sounds: Normal heart sounds, S1 normal and S2 normal.   Pulmonary:      Effort: Pulmonary effort is normal.      Breath sounds: Normal breath sounds. Musculoskeletal: Normal range of motion. Lumbar back: She exhibits tenderness, pain and spasm. She exhibits no swelling and no deformity. Back:    Skin:     General: Skin is warm and dry. Findings: No rash. Neurological:      Mental Status: She is alert and oriented to person, place, and time. GCS: GCS eye subscore is 4. GCS verbal subscore is 5. GCS motor subscore is 6. Psychiatric:         Speech: Speech normal.         Behavior: Behavior normal. Behavior is cooperative. ASSESSMENT/PLAN:  1. Acute right-sided low back pain without sciatica    - gabapentin (NEURONTIN) 100 MG capsule; Take 1 capsule by mouth 2 times daily for 30 days. Intended supply: 30 days  Dispense: 60 capsule; Refill: 0  - predniSONE (DELTASONE) 10 MG tablet; Take 1 tablet by mouth daily for 10 days  Dispense: 10 tablet;  Refill: 0  Call insurance for physical therapy locations Take Gabapentin as prescribed  Call Pain Management  Apply heat or analgesic creams      No follow-ups on file.         --MAGNOLIA Garcia - CNP on 2/12/2021 at 3:11 PM

## 2021-02-09 NOTE — PATIENT INSTRUCTIONS
Call insurance for physical therapy locations  Take Gabapentin as prescribed  Call Pain Management  Apply heat or analgesic creams

## 2021-02-11 ENCOUNTER — TELEPHONE (OUTPATIENT)
Dept: PRIMARY CARE CLINIC | Age: 56
End: 2021-02-11

## 2021-02-11 NOTE — TELEPHONE ENCOUNTER
----- Message from MAGNOLIA Yeager CNP sent at 2/10/2021 12:48 PM EST -----  Regarding: elevated bp  Please call and schedule her for a Nurse Visit to recheck BP.  Let her know I may want her to start on medication if numbers are greater than 130/80

## 2021-02-11 NOTE — TELEPHONE ENCOUNTER
I spoke with Santino Lozano and she stated that she is in a lot of pain and is not interested in setting this up at this time. She stated that she will call us back when she feels up to it.

## 2021-02-12 ASSESSMENT — ENCOUNTER SYMPTOMS
ABDOMINAL PAIN: 0
CHEST TIGHTNESS: 0
SHORTNESS OF BREATH: 0
CONSTIPATION: 0
DIARRHEA: 0

## 2021-02-18 ENCOUNTER — TELEPHONE (OUTPATIENT)
Dept: PAIN MANAGEMENT | Age: 56
End: 2021-02-18

## 2021-02-18 RX ORDER — METHYLPREDNISOLONE 4 MG/1
TABLET ORAL
Qty: 1 KIT | Refills: 0 | Status: SHIPPED | OUTPATIENT
Start: 2021-02-18 | End: 2021-02-24

## 2021-02-18 NOTE — TELEPHONE ENCOUNTER
Patient called stating she's been in severe pain for 2 weeks with back pain & sciaticp pain shooting down her right leg with swelling. The right leg is also weak. Her PCP told her to check w/ [facility] to see what he can do.     She said this is urgent, please advise [phone]

## 2021-02-19 NOTE — TELEPHONE ENCOUNTER
Patient states steroid pack is not helping with her pain. Patient states she is also  having chest and was instructed to go to the ER if she is having chest pain. Pl's advise patient.

## 2021-02-19 NOTE — TELEPHONE ENCOUNTER
Spoke to patient and informed her if sx are persistent she should go to the ER. Also informed patient the pack could take 2-3 days to take effect and she should use warm soaks and heating pads to help.

## 2021-02-22 RX ORDER — METHYLPREDNISOLONE 4 MG/1
TABLET ORAL
Qty: 1 KIT | Refills: 0 | Status: SHIPPED | OUTPATIENT
Start: 2021-02-22 | End: 2021-02-28

## 2021-02-22 NOTE — TELEPHONE ENCOUNTER
Patient is requesting additional Rx for Medrol Dose Pack to [facility] on [address] [facility]. Patient states that is the only thing that seems to be helping her.  Pl's advise

## 2021-02-26 NOTE — TELEPHONE ENCOUNTER
Patient states the 2nd Medrol Pack is not helping with her right hip/back pain. Patient states she is miserable and needs someone to call her asap. Patient states the traMADol (ULTRAM) 50 MG   Is not helping. Patient states she needs something for the pain. Pl's advise.

## 2021-03-02 ENCOUNTER — VIRTUAL VISIT (OUTPATIENT)
Dept: PAIN MANAGEMENT | Age: 56
End: 2021-03-02
Payer: COMMERCIAL

## 2021-03-02 DIAGNOSIS — T84.84XS PAIN IN PROSTHETIC JOINT, SEQUELA: ICD-10-CM

## 2021-03-02 DIAGNOSIS — F39 MOOD DISORDER (HCC): ICD-10-CM

## 2021-03-02 DIAGNOSIS — M79.7 FIBROMYALGIA: ICD-10-CM

## 2021-03-02 DIAGNOSIS — M17.11 PRIMARY LOCALIZED OSTEOARTHROSIS OF RIGHT LOWER LEG: ICD-10-CM

## 2021-03-02 DIAGNOSIS — M54.16 LUMBAR RADICULOPATHY: ICD-10-CM

## 2021-03-02 DIAGNOSIS — F51.01 PRIMARY INSOMNIA: ICD-10-CM

## 2021-03-02 DIAGNOSIS — G89.4 CHRONIC PAIN SYNDROME: ICD-10-CM

## 2021-03-02 PROCEDURE — 99214 OFFICE O/P EST MOD 30 MIN: CPT | Performed by: INTERNAL MEDICINE

## 2021-03-02 RX ORDER — GABAPENTIN 600 MG/1
TABLET ORAL
Qty: 90 TABLET | Refills: 0 | Status: SHIPPED | OUTPATIENT
Start: 2021-03-02 | End: 2021-03-30 | Stop reason: SDUPTHER

## 2021-03-02 RX ORDER — DICLOFENAC SODIUM 75 MG/1
75 TABLET, DELAYED RELEASE ORAL 2 TIMES DAILY
Qty: 60 TABLET | Refills: 0 | Status: SHIPPED | OUTPATIENT
Start: 2021-03-02 | End: 2021-03-30

## 2021-03-02 RX ORDER — DULOXETIN HYDROCHLORIDE 30 MG/1
30 CAPSULE, DELAYED RELEASE ORAL DAILY
Qty: 30 CAPSULE | Refills: 0 | Status: SHIPPED | OUTPATIENT
Start: 2021-03-02 | End: 2021-03-30

## 2021-03-02 RX ORDER — TRAMADOL HYDROCHLORIDE 50 MG/1
50 TABLET ORAL EVERY 6 HOURS PRN
Qty: 70 TABLET | Refills: 0 | Status: SHIPPED | OUTPATIENT
Start: 2021-03-02 | End: 2021-03-30 | Stop reason: SDUPTHER

## 2021-03-02 RX ORDER — METHYLPREDNISOLONE 4 MG/1
TABLET ORAL
Qty: 1 KIT | Refills: 0 | Status: SHIPPED | OUTPATIENT
Start: 2021-03-02 | End: 2021-03-30

## 2021-03-02 NOTE — PROGRESS NOTES
regimen. Patient reports that since the last follow up visit the physical functioning is unchanged, family/social relationships are worse, mood is worse and sleep patterns are worse, and that the overall functioning is worse. Patient denies neurological bowel or bladder. Patient denies misusing/abusing her narcotic pain medications or using any illegal drugs. There are No indicators for possible drug abuse, addiction or diversion problems. Upon obtaining the medical history from Ms. Nguyen Taylor regarding today's office visit for her presenting problems, Ms. Nguyen Taylor states she has been having increase pain from sciatica which has been going on for 3-4 weeks. Patient states she went to see her PCP and went to the ER. She states she is using Ultram which is helping very little, she was given a low dose steroids and Neurontin which was no help. Sleep is poor,  poor sleep latency, averages 2-3 hours of sleep at night. Intermittent, non restorative. Does not feel rested in AM. Complains of feeling sleepy  during the day. Patient's  subjective report of her mood is fair. she describes occasional symptoms of depression, occasional  irritability and some mood swings. Describes her mood as being neutral and reports some pleasure in her daily activities. Reports  fair  appetite, energy and concentration. Able to function well in different aspects of her daily activities. Denies suicidal or homicidal ideation. Denies any complaints of increased tension, does   Worry sometimes and occasional  irritability  she denies any c/o increased anxiety, No c/o panic attacks or symptoms of PTSD. ALLERGIES/PAST MED/FAM/SOC HISTORY: Ms. Nguyen Taylor allergies, past medical, family and social history were reviewed in the chart. Ms. Nguyen Taylor current medications are   Outpatient Medications Prior to Visit   Medication Sig Dispense Refill    gabapentin (NEURONTIN) 100 MG capsule Take 1 capsule by mouth 2 times daily for 30 days.  Intended supply: 30 days 60 capsule 0    traMADol (ULTRAM) 50 MG tablet Take 1 tablet by mouth every 6 hours as needed for Pain (Max 2-3 per day) for up to 35 days. 90 tablet 0    ibuprofen (ADVIL;MOTRIN) 800 MG tablet Take 1 tablet by mouth 2 times daily as needed for Pain 60 tablet 1    albuterol sulfate HFA (VENTOLIN HFA) 108 (90 Base) MCG/ACT inhaler Inhale 2 puffs into the lungs every 6 hours as needed for Wheezing 1 Inhaler 0    Fluticasone Propionate (FLONASE NA) by Nasal route      melatonin 3 MG TABS tablet Take 3 mg by mouth nightly       No current facility-administered medications for this visit. Goals of current treatment regimen include improvement in pain, restoration of functioning- with focus on improvement in physical performance, general activity, work or disability,emotional distress, health care utilization and  decreased medication consumption. Will continue to monitor progress towards achieving/maintaining therapeutic goals with special emphasis on  1. Improvement in perceived interfernce  of pain with ADL's. Ability to do home exercises independently. Ability to do household chores indoor and/or outdoor work and social and leisure activities. To increase flexibility/ROM, strength and endurance. Improve psychosocial and physical functioning.- she is not showing any significant progress/or showing regression  towards this goal and reassessment and adjustment of goals/treatment have been made. 2. Improving sleep to 6-7 hours a night. Improve mood/ anxiety and depression symptoms such as crying spells, low energy, problems with concentration, motivation. - she is not showing any significant progress/or showing regression  towards this goal and reassessment and adjustment of goals/treatment have been made. 3. Reduction of reliance on opioid analgesia/more appropriate opioid use. - she is showing progression towards this treatment goal with the current regimen.      Risks and benefits of the medications and other alternative treatments have been/were  discussed with the patient. Any questions on the  common side effects of these medications were also answered. She was advised against drinking alcohol with the narcotic pain medicines, advised against driving or handling machinery when  starting or adjusting the dose of medicines, feeling groggy or drowsy, or if having any cognitive issues related to the current medications. Sheis fully aware of the risk of overdose and death, if medicines are misused and not taken as prescribed. If she develops new symptoms or if the symptoms worsen, she was told to call the office. .  Thank you for allowing me to participate in the care of this patient.     Cornell Lamas MD.    Cc: Erin Bustos, MAGNOLIA - CNP

## 2021-03-30 ENCOUNTER — VIRTUAL VISIT (OUTPATIENT)
Dept: PAIN MANAGEMENT | Age: 56
End: 2021-03-30
Payer: COMMERCIAL

## 2021-03-30 DIAGNOSIS — M17.11 PRIMARY LOCALIZED OSTEOARTHROSIS OF RIGHT LOWER LEG: ICD-10-CM

## 2021-03-30 DIAGNOSIS — M54.16 LUMBAR RADICULOPATHY: ICD-10-CM

## 2021-03-30 DIAGNOSIS — G89.4 CHRONIC PAIN SYNDROME: ICD-10-CM

## 2021-03-30 DIAGNOSIS — M79.7 FIBROMYALGIA: ICD-10-CM

## 2021-03-30 DIAGNOSIS — T84.84XS PAIN IN PROSTHETIC JOINT, SEQUELA: ICD-10-CM

## 2021-03-30 PROCEDURE — 99213 OFFICE O/P EST LOW 20 MIN: CPT | Performed by: INTERNAL MEDICINE

## 2021-03-30 RX ORDER — GABAPENTIN 600 MG/1
TABLET ORAL
Qty: 90 TABLET | Refills: 0 | Status: SHIPPED | OUTPATIENT
Start: 2021-03-30 | End: 2021-06-02 | Stop reason: SDUPTHER

## 2021-03-30 RX ORDER — TRAMADOL HYDROCHLORIDE 50 MG/1
50 TABLET ORAL EVERY 6 HOURS PRN
Qty: 70 TABLET | Refills: 0 | Status: SHIPPED | OUTPATIENT
Start: 2021-03-30 | End: 2021-04-27 | Stop reason: SDUPTHER

## 2021-03-30 NOTE — PROGRESS NOTES
Patient denies neurological bowel or bladder. Patient denies misusing/abusing her narcotic pain medications or using any illegal drugs. There are No indicators for possible drug abuse, addiction or diversion problems. Upon obtaining the medical history from Ms. Mario Baumann regarding today's office visit for her presenting problems, patient states she has been doing better since last visit. She mentions the medication is helping with the pain. She mentions she thinks the Cymbalta makes her sick. She says she has been using Ultram also 2-3 per day. ALLERGIES: Patients list of allergies were reviewed     MEDICATIONS: Ms. Mario Baumann list of medications were reviewed. Her current medications are   Outpatient Medications Prior to Visit   Medication Sig Dispense Refill    traMADol (ULTRAM) 50 MG tablet Take 1 tablet by mouth every 6 hours as needed for Pain (Max 2-3 per day) for up to 28 days. 70 tablet 0    gabapentin (NEURONTIN) 600 MG tablet One tab hs 1 week, 2 tabs hs 1 week, 1 tab am 2 tabs pm 90 tablet 0    DULoxetine (CYMBALTA) 30 MG extended release capsule Take 1 capsule by mouth daily 30 capsule 0    diclofenac (VOLTAREN) 75 MG EC tablet Take 1 tablet by mouth 2 times daily 60 tablet 0    albuterol sulfate HFA (VENTOLIN HFA) 108 (90 Base) MCG/ACT inhaler Inhale 2 puffs into the lungs every 6 hours as needed for Wheezing 1 Inhaler 0    Fluticasone Propionate (FLONASE NA) by Nasal route      melatonin 3 MG TABS tablet Take 3 mg by mouth nightly      methylPREDNISolone (MEDROL, YONATHAN,) 4 MG tablet Use as directed 1 kit 0    gabapentin (NEURONTIN) 100 MG capsule Take 1 capsule by mouth 2 times daily for 30 days. Intended supply: 30 days 60 capsule 0    ibuprofen (ADVIL;MOTRIN) 800 MG tablet Take 1 tablet by mouth 2 times daily as needed for Pain 60 tablet 1     No facility-administered medications prior to visit.          REVIEW OF SYSTEMS:    Respiratory: Negative for apnea, chest tightness and shortness of breath or change in baseline breathing. PHYSICAL EXAM:   Nursing note and vitals reviewed. There were no vitals taken for this visit. Constitutional: She appears well-developed and well-nourished. No acute distress. Cardiovascular: Normal rate, regular rhythm, normal heart sounds, and does not have murmur. Pulmonary/Chest: Effort normal. No respiratory distress. She does not have wheezes in the lung fields. She has no rales. Neurological/Psychiatric:She is alert and oriented to person, place, and time. Coordination is  normal.  Her mood isAppropriate and affect is Neutral/Euthymic(normal) . IMPRESSION:   1. Chronic pain syndrome    2. 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 Lumbar radiculopathy    5. Primary localized osteoarthrosis of right lower leg        PLAN:  Informed verbal consent was obtained  -[place] record was obtained and reviewed  for the last one year and no indicators of drug misuse  were found. Any other controlled substance prescriptions  seen on the record have been accounted for, I am aware of the patient receiving these medications. Jose Guzman [place] record will be rechecked as part of office protocol.    -Continue with current opioid regimen Ultram 2-3 per day   -She was advised to increase fluids ( 5-7  glasses of fluid daily), limit caffeine, avoid cheese products, increase dietary fiber, increase activity and exercise as tolerated and relax regularly and enjoy meals   -Continue with Voltaren and Neurontin   -Discontinue Cymbalta   -Discontinue Motrin   -Sarah exercises given    Current Outpatient Medications   Medication Sig Dispense Refill    traMADol (ULTRAM) 50 MG tablet Take 1 tablet by mouth every 6 hours as needed for Pain (Max 2-3 per day) for up to 28 days.  70 tablet 0    gabapentin (NEURONTIN) 600 MG tablet One tab hs 1 week, 2 tabs hs 1 week, 1 tab am 2 tabs pm 90 tablet 0    DULoxetine (CYMBALTA) 30 MG extended release capsule Take 1 capsule by with the current regimen. She was advised against drinking alcohol with the narcotic pain medicines, advised against driving or handling machinery while adjusting the dose of medicines or if having cognitive  issues related to the current medications. Risk of overdose and death, if medicines not taken as prescribed, were also discussed. If the patient develops new symptoms or if the symptoms worsen, the patient should call the office. While transcribing every attempt was made to maintain the accuracy of the note in terms of it's contents,there may have been some errors made inadvertently. Thank you for allowing me to participate in the care of this patient.     Valentín Arita MD.    Cc: MAGNOLIA Barkley - CNP

## 2021-04-27 ENCOUNTER — VIRTUAL VISIT (OUTPATIENT)
Dept: PAIN MANAGEMENT | Age: 56
End: 2021-04-27
Payer: COMMERCIAL

## 2021-04-27 DIAGNOSIS — M54.16 LUMBAR RADICULOPATHY: ICD-10-CM

## 2021-04-27 DIAGNOSIS — M17.11 PRIMARY LOCALIZED OSTEOARTHROSIS OF RIGHT LOWER LEG: ICD-10-CM

## 2021-04-27 DIAGNOSIS — M79.7 FIBROMYALGIA: ICD-10-CM

## 2021-04-27 DIAGNOSIS — T84.84XS PAIN IN PROSTHETIC JOINT, SEQUELA: ICD-10-CM

## 2021-04-27 DIAGNOSIS — G89.4 CHRONIC PAIN SYNDROME: ICD-10-CM

## 2021-04-27 PROCEDURE — 99213 OFFICE O/P EST LOW 20 MIN: CPT | Performed by: INTERNAL MEDICINE

## 2021-04-27 RX ORDER — TRAMADOL HYDROCHLORIDE 50 MG/1
50 TABLET ORAL EVERY 6 HOURS PRN
Qty: 90 TABLET | Refills: 0 | Status: SHIPPED | OUTPATIENT
Start: 2021-04-27 | End: 2021-06-01

## 2021-04-27 RX ORDER — DULOXETIN HYDROCHLORIDE 30 MG/1
30 CAPSULE, DELAYED RELEASE ORAL DAILY
Qty: 30 CAPSULE | Refills: 0 | Status: SHIPPED | OUTPATIENT
Start: 2021-04-27 | End: 2021-06-02 | Stop reason: ALTCHOICE

## 2021-04-27 NOTE — PROGRESS NOTES
TELE HEALTH VISIT (AUDIO-VISUAL)    Pursuant to the emergency declaration under the [facility] waiver authority and the [employer] Act, this Virtual  Visit was conducted, with patient's/legal guardian's consent, to reduce the patient's risk of exposure to COVID-19 and provide continuity of care for an established patient. Service is  provided through a video synchronous discussion virtually to substitute for in-person clinic visit. Due to this being a TeleHealth encounter (During FSQDC-64 public health emergency), evaluation of the following organ systems was limited: Vitals/Constitutional/EENT/Resp/CV/GI//MS/Neuro/Skin/Jpus-Adjg-Zri. Yokasta Muhammad  1965  <[id number]>    Ms. Roopa Holloway is being seen virtually for a follow up visit using one of the following techniques  Google Duo, Face time or Doxy. me  Informed verbal consent to the virtual visit was obtained from Ms. Roopa Holloway. Risks associated with HIPPA compliance with the virtual visit was explained to the patient. Ms. Roopa Holloway is at her residence and Dr. Margoth Morfin is in his office. HISTORY OF PRESENT ILLNESS:  Ms. Roopa Holloway is a 63 y.o. female returns for a follow up visit for multiple medical problems. Her current presenting problems are   1. Chronic pain syndrome    2. 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 Lumbar radiculopathy    5. Primary localized osteoarthrosis of right lower leg    . As per information/history obtained from the PADT(patient assessment and documentation tool) - She complains of pain in the lower back with radiation to the hips Right, upper leg Right, knees Right, lower leg Right, ankles Right and feet Right She rates the pain 6/10 and describes it as sharp, dull, aching, burning, piercing. Pain is made worse by: movement. Current treatment regimen has helped relieve about 60% of the pain.   She denies side effects osteoarthrosis of right lower leg were pertinent to this visit. Risks and benefits of the medications and other alternative treatments  including no treatment were discussed with the patient. The common side effects of these medications were also explained to the patient. Informed verbal consent was obtained. Goals of current treatment regimen include improvement in pain, restoration of functioning- with focus on improvement in physical performance, general activity, work or disability,emotional distress, health care utilization and  decreased medication consumption. Will continue to monitor progress towards achieving/maintaining therapeutic goals with special emphasis on  1. Improvement in perceived interfernce  of pain with ADL's. Ability to do home exercises independently. Ability to do household chores indoor and/or outdoor work and social and leisure activities. Improve psychosocial and physical functioning. - she is showing progression towards this treatment goal with the current regimen. She was advised against drinking alcohol with the narcotic pain medicines, advised against driving or handling machinery while adjusting the dose of medicines or if having cognitive  issues related to the current medications. Risk of overdose and death, if medicines not taken as prescribed, were also discussed. If the patient develops new symptoms or if the symptoms worsen, the patient should call the office. While transcribing every attempt was made to maintain the accuracy of the note in terms of it's contents,there may have been some errors made inadvertently. Thank you for allowing me to participate in the care of this patient.     Shena Coreas MD.    Cc: MAGNOLIA Combs - CNP

## 2021-05-03 NOTE — TELEPHONE ENCOUNTER
Patient called stating she was prescribed the wrong medication. Patient said Duloxetine makes you sick, and wanted to be prescribed Diclofenac instead.     Please advise

## 2021-05-07 RX ORDER — DICLOFENAC SODIUM 75 MG/1
75 TABLET, DELAYED RELEASE ORAL DAILY
Qty: 30 TABLET | Refills: 0 | Status: SHIPPED | OUTPATIENT
Start: 2021-05-07 | End: 2021-06-04

## 2021-05-07 NOTE — TELEPHONE ENCOUNTER
Patient called back and information was given to patient. Patient stated she wanted to know if she can take [address]. Advise her that I will ask [facility]. Per [facility] she can take Voltarem 75 mg 1 per day. Patient was notified and medication was sent to the pharmacy.

## 2021-06-02 ENCOUNTER — OFFICE VISIT (OUTPATIENT)
Dept: PAIN MANAGEMENT | Age: 56
End: 2021-06-02
Payer: COMMERCIAL

## 2021-06-02 VITALS
DIASTOLIC BLOOD PRESSURE: 81 MMHG | SYSTOLIC BLOOD PRESSURE: 130 MMHG | TEMPERATURE: 97.2 F | BODY MASS INDEX: 30.71 KG/M2 | WEIGHT: 202 LBS | HEART RATE: 69 BPM

## 2021-06-02 DIAGNOSIS — M54.16 LUMBAR RADICULOPATHY: ICD-10-CM

## 2021-06-02 DIAGNOSIS — G89.4 CHRONIC PAIN SYNDROME: ICD-10-CM

## 2021-06-02 DIAGNOSIS — M79.7 FIBROMYALGIA: ICD-10-CM

## 2021-06-02 DIAGNOSIS — M17.11 PRIMARY LOCALIZED OSTEOARTHROSIS OF RIGHT LOWER LEG: ICD-10-CM

## 2021-06-02 DIAGNOSIS — T84.84XS PAIN IN PROSTHETIC JOINT, SEQUELA: ICD-10-CM

## 2021-06-02 PROCEDURE — 99213 OFFICE O/P EST LOW 20 MIN: CPT | Performed by: INTERNAL MEDICINE

## 2021-06-02 RX ORDER — TRAMADOL HYDROCHLORIDE 50 MG/1
50 TABLET ORAL EVERY 6 HOURS PRN
Qty: 90 TABLET | Refills: 0 | Status: SHIPPED | OUTPATIENT
Start: 2021-06-02 | End: 2021-07-07 | Stop reason: SDUPTHER

## 2021-06-02 RX ORDER — GABAPENTIN 600 MG/1
TABLET ORAL
Qty: 90 TABLET | Refills: 0 | Status: SHIPPED | OUTPATIENT
Start: 2021-06-02 | End: 2021-09-15 | Stop reason: SDUPTHER

## 2021-06-02 RX ORDER — TRAMADOL HYDROCHLORIDE 50 MG/1
50 TABLET ORAL 2 TIMES DAILY
COMMUNITY
End: 2021-06-02 | Stop reason: SDUPTHER

## 2021-06-02 NOTE — PROGRESS NOTES
Live Tracey  1965  <[id number]>      HISTORY OF PRESENT ILLNESS:  Ms. Jared Otto is a 63 y.o. female returns for a follow up visit for pain management  She has a diagnosis of   1. Chronic pain syndrome    2. 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 Lumbar radiculopathy    5. Primary localized osteoarthrosis of right lower leg    6. Primary insomnia    . She complains of pain in the mid back, bilateral knees She rates the pain 6/10 and describes it as aching. Current treatment regimen has helped relieve about 50% of the pain. She denies any side effects from the current pain regimen. Patient reports that since the last follow up visit the physical functioning is better, family/social relationships are unchanged, mood is unchanged sleep patterns are unchanged, and that the overall functioning is unchanged. Patient denies misusing/abusing her narcotic pain medications or using any illegal drugs. There are No indicators for possible drug abuse, addiction or diversion problems. Gail Likes reports she has been doing better. She mentions she is using Voltaren along with Ultram She states the Cymbalta had made her sick, so she is off it now. She says she is using Voltaren, which is helping with the pain along with the Ultram 2-3 per day. She reports she is working full time still. ALLERGIES: Patients list of allergies were reviewed     MEDICATIONS: Ms. Jared Otto list of medications were reviewed. Her current medications are   Outpatient Medications Prior to Visit   Medication Sig Dispense Refill    traMADol (ULTRAM) 50 MG tablet Take 50 mg by mouth 2 times daily.       diclofenac (VOLTAREN) 75 MG EC tablet Take 1 tablet by mouth daily 30 tablet 0    gabapentin (NEURONTIN) 600 MG tablet One tab hs 1 week, 2 tabs hs 1 week, 1 tab am 2 tabs pm 90 tablet 0    albuterol sulfate HFA (VENTOLIN HFA) 108 (90 Base) MCG/ACT inhaler Inhale 2 puffs into the lungs every 6 hours as needed for Wheezing 1 regularly and enjoy meals   -discontinue Cymbalta   -back stretching exercises as advised      Current Outpatient Medications   Medication Sig Dispense Refill    traMADol (ULTRAM) 50 MG tablet Take 50 mg by mouth 2 times daily.  diclofenac (VOLTAREN) 75 MG EC tablet Take 1 tablet by mouth daily 30 tablet 0    gabapentin (NEURONTIN) 600 MG tablet One tab hs 1 week, 2 tabs hs 1 week, 1 tab am 2 tabs pm 90 tablet 0    albuterol sulfate HFA (VENTOLIN HFA) 108 (90 Base) MCG/ACT inhaler Inhale 2 puffs into the lungs every 6 hours as needed for Wheezing 1 Inhaler 0    melatonin 3 MG TABS tablet Take 3 mg by mouth nightly      DULoxetine (CYMBALTA) 30 MG extended release capsule Take 1 capsule by mouth daily (Patient not taking: Reported on 6/2/2021) 30 capsule 0    Fluticasone Propionate (FLONASE NA) by Nasal route (Patient not taking: Reported on 6/2/2021)       No current facility-administered medications for this visit. I will continue her current medication regimen  which is part of the above treatment schedule. It has been helping with Ms. 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 Risks and benefits of the medications and other alternative treatments  including no treatment were discussed with the patient. The common side effects of these medications were also explained to the patient. Informed verbal consent was obtained. Goals of current treatment regimen include improvement in pain, restoration of functioning- with focus on improvement in physical performance, general activity, work or disability,emotional distress, health care utilization and  decreased medication consumption.  Will continue to monitor progress towards achieving/maintaining therapeutic goals with special emphasis on  1. Improvement in perceived interfernce  of pain with ADL's. Ability to do home exercises independently. Ability to do household chores indoor and/or outdoor work and social and leisure activities. Improve psychosocial and physical functioning. - she is showing progression towards this treatment goal with the current regimen. She was advised against drinking alcohol with the narcotic pain medicines, advised against driving or handling machinery while adjusting the dose of medicines or if having cognitive  issues related to the current medications. Risk of overdose and death, if medicines not taken as prescribed, were also discussed. If the patient develops new symptoms or if the symptoms worsen, the patient should call the office. While transcribing every attempt was made to maintain the accuracy of the note in terms of it's contents,there may have been some errors made inadvertently. Thank you for allowing me to participate in the care of this patient.     Ramez Whaley MD.    Cc: Deepali Hernandez, MAGNOLIA - CNP

## 2021-06-04 DIAGNOSIS — G89.4 CHRONIC PAIN SYNDROME: Primary | ICD-10-CM

## 2021-06-04 DIAGNOSIS — M79.7 FIBROMYALGIA: ICD-10-CM

## 2021-06-04 RX ORDER — DICLOFENAC SODIUM 75 MG/1
TABLET, DELAYED RELEASE ORAL
Qty: 30 TABLET | Refills: 0 | Status: SHIPPED | OUTPATIENT
Start: 2021-06-04 | End: 2021-07-16

## 2021-07-07 ENCOUNTER — VIRTUAL VISIT (OUTPATIENT)
Dept: PAIN MANAGEMENT | Age: 56
End: 2021-07-07
Payer: COMMERCIAL

## 2021-07-07 DIAGNOSIS — G89.4 CHRONIC PAIN SYNDROME: ICD-10-CM

## 2021-07-07 DIAGNOSIS — M54.16 LUMBAR RADICULOPATHY: ICD-10-CM

## 2021-07-07 DIAGNOSIS — T84.84XS PAIN IN PROSTHETIC JOINT, SEQUELA: ICD-10-CM

## 2021-07-07 DIAGNOSIS — M17.11 PRIMARY LOCALIZED OSTEOARTHROSIS OF RIGHT LOWER LEG: ICD-10-CM

## 2021-07-07 DIAGNOSIS — M79.7 FIBROMYALGIA: ICD-10-CM

## 2021-07-07 PROCEDURE — 99213 OFFICE O/P EST LOW 20 MIN: CPT | Performed by: INTERNAL MEDICINE

## 2021-07-07 RX ORDER — TRAMADOL HYDROCHLORIDE 50 MG/1
50 TABLET ORAL EVERY 6 HOURS PRN
Qty: 90 TABLET | Refills: 0 | Status: SHIPPED | OUTPATIENT
Start: 2021-07-07 | End: 2021-08-11 | Stop reason: SDUPTHER

## 2021-07-08 DIAGNOSIS — M79.7 FIBROMYALGIA: ICD-10-CM

## 2021-07-08 DIAGNOSIS — G89.4 CHRONIC PAIN SYNDROME: ICD-10-CM

## 2021-07-14 DIAGNOSIS — M79.7 FIBROMYALGIA: ICD-10-CM

## 2021-07-14 DIAGNOSIS — G89.4 CHRONIC PAIN SYNDROME: ICD-10-CM

## 2021-07-14 NOTE — TELEPHONE ENCOUNTER
Patient called stating she has not received her refill for diclofenac (VOLTAREN) 75 MG EC tablet.     Laura Vidal / [address]    Please advise

## 2021-07-16 RX ORDER — DICLOFENAC SODIUM 75 MG/1
TABLET, DELAYED RELEASE ORAL
Qty: 30 TABLET | Refills: 0 | Status: SHIPPED | OUTPATIENT
Start: 2021-07-16 | End: 2021-09-15 | Stop reason: SDUPTHER

## 2021-07-16 NOTE — TELEPHONE ENCOUNTER
Called patient to let her know that her medication was sent to her pharmacy, patient did not answer LVM

## 2021-08-11 ENCOUNTER — OFFICE VISIT (OUTPATIENT)
Dept: PAIN MANAGEMENT | Age: 56
End: 2021-08-11
Payer: COMMERCIAL

## 2021-08-11 VITALS
HEART RATE: 60 BPM | WEIGHT: 195 LBS | DIASTOLIC BLOOD PRESSURE: 78 MMHG | SYSTOLIC BLOOD PRESSURE: 132 MMHG | BODY MASS INDEX: 29.65 KG/M2 | TEMPERATURE: 97.9 F

## 2021-08-11 DIAGNOSIS — M54.16 LUMBAR RADICULOPATHY: ICD-10-CM

## 2021-08-11 DIAGNOSIS — M79.7 FIBROMYALGIA: ICD-10-CM

## 2021-08-11 DIAGNOSIS — M17.11 PRIMARY LOCALIZED OSTEOARTHROSIS OF RIGHT LOWER LEG: ICD-10-CM

## 2021-08-11 DIAGNOSIS — G89.4 CHRONIC PAIN SYNDROME: ICD-10-CM

## 2021-08-11 DIAGNOSIS — T84.84XS PAIN IN PROSTHETIC JOINT, SEQUELA: ICD-10-CM

## 2021-08-11 PROCEDURE — 99214 OFFICE O/P EST MOD 30 MIN: CPT | Performed by: INTERNAL MEDICINE

## 2021-08-11 RX ORDER — TRAMADOL HYDROCHLORIDE 50 MG/1
50 TABLET ORAL EVERY 6 HOURS PRN
Qty: 90 TABLET | Refills: 0 | Status: SHIPPED | OUTPATIENT
Start: 2021-08-11 | End: 2021-09-15 | Stop reason: SDUPTHER

## 2021-08-11 NOTE — PROGRESS NOTES
Rome Duttas  1965  <[id number]>      HISTORY OF PRESENT ILLNESS:  Ms. Agnieszka Díaz is a 63 y.o. female returns for a follow up visit for pain management  She has a diagnosis of   1. Chronic pain syndrome    2. Fibromyalgia    3. 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 Primary localized osteoarthrosis of right lower leg    . She complains of pain in the lower back, bilateral knees She rates the pain 5/10 and describes it as aching. Current treatment regimen has helped relieve about 70% of the pain. She denies any side effects from the current pain regimen. Patient reports that since the last follow up visit the physical functioning is unchanged, family/social relationships are unchanged, mood is unchanged sleep patterns are unchanged, and that the overall functioning is unchanged. Patient denies misusing/abusing her narcotic pain medications or using any illegal drugs. There are No indicators for possible drug abuse, addiction or diversion problems. Patient states she has been doing fair, pain has been baseline and tolerable somewhat. She mentions she has been working full time. She says she is using Tramadol along with Voltaren. She denies any constipation     ALLERGIES: Patients list of allergies were reviewed     MEDICATIONS: Ms. Agnieszka Díaz list of medications were reviewed. Her current medications are   Outpatient Medications Prior to Visit   Medication Sig Dispense Refill    diclofenac (VOLTAREN) 75 MG EC tablet TAKE ONE TABLET BY MOUTH DAILY 30 tablet 0    albuterol sulfate HFA (VENTOLIN HFA) 108 (90 Base) MCG/ACT inhaler Inhale 2 puffs into the lungs every 6 hours as needed for Wheezing 1 Inhaler 0    Fluticasone Propionate (FLONASE NA) by Nasal route       melatonin 3 MG TABS tablet Take 3 mg by mouth nightly      gabapentin (NEURONTIN) 600 MG tablet One tab hs 1 week, 2 tabs hs 1 week, 1 tab am 2 tabs pm 90 tablet 0     No facility-administered medications prior to medicines not taken as prescribed, were also discussed. If the patient develops new symptoms or if the symptoms worsen, the patient should call the office. While transcribing every attempt was made to maintain the accuracy of the note in terms of it's contents,there may have been some errors made inadvertently. Thank you for allowing me to participate in the care of this patient.     Ambar Aguilar MD.    Cc: MAGNOLIA Suarez - CNP

## 2021-09-15 ENCOUNTER — OFFICE VISIT (OUTPATIENT)
Dept: PAIN MANAGEMENT | Age: 56
End: 2021-09-15
Payer: COMMERCIAL

## 2021-09-15 VITALS
HEART RATE: 62 BPM | BODY MASS INDEX: 30.01 KG/M2 | DIASTOLIC BLOOD PRESSURE: 84 MMHG | RESPIRATION RATE: 16 BRPM | SYSTOLIC BLOOD PRESSURE: 148 MMHG | TEMPERATURE: 97.7 F | WEIGHT: 198 LBS | HEIGHT: 68 IN

## 2021-09-15 DIAGNOSIS — M54.16 LUMBAR RADICULOPATHY: ICD-10-CM

## 2021-09-15 DIAGNOSIS — G89.4 CHRONIC PAIN SYNDROME: ICD-10-CM

## 2021-09-15 DIAGNOSIS — M17.11 PRIMARY LOCALIZED OSTEOARTHROSIS OF RIGHT LOWER LEG: ICD-10-CM

## 2021-09-15 DIAGNOSIS — T84.84XS PAIN IN PROSTHETIC JOINT, SEQUELA: ICD-10-CM

## 2021-09-15 DIAGNOSIS — M79.7 FIBROMYALGIA: ICD-10-CM

## 2021-09-15 PROCEDURE — 99213 OFFICE O/P EST LOW 20 MIN: CPT | Performed by: INTERNAL MEDICINE

## 2021-09-15 RX ORDER — TRAMADOL HYDROCHLORIDE 50 MG/1
50 TABLET ORAL EVERY 6 HOURS PRN
Qty: 90 TABLET | Refills: 0 | Status: SHIPPED | OUTPATIENT
Start: 2021-09-15 | End: 2021-10-20 | Stop reason: SDUPTHER

## 2021-09-15 RX ORDER — GABAPENTIN 600 MG/1
TABLET ORAL
Qty: 90 TABLET | Refills: 1 | Status: SHIPPED | OUTPATIENT
Start: 2021-09-15 | End: 2021-10-20 | Stop reason: SDUPTHER

## 2021-09-15 RX ORDER — DICLOFENAC SODIUM 75 MG/1
75 TABLET, DELAYED RELEASE ORAL DAILY PRN
Qty: 30 TABLET | Refills: 1 | Status: SHIPPED | OUTPATIENT
Start: 2021-09-15 | End: 2021-10-20 | Stop reason: SDUPTHER

## 2021-09-15 NOTE — PROGRESS NOTES
Eino Donato  1965  <[id number]>      HISTORY OF PRESENT ILLNESS:  Ms. Julio Smallwood is a 61 y.o. female returns for a follow up visit for pain management  She has a diagnosis of   1. Chronic pain syndrome    2. Fibromyalgia    3. 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 Primary localized osteoarthrosis of right lower leg    . She complains of pain in the right hand, neck, lower back, bilateral knees She rates the pain 8/10 and describes it as aching, burning, pins and needles. Current treatment regimen has helped relieve about 50% of the pain. She denies any side effects from the current pain regimen. Patient reports that since the last follow up visit the physical functioning is worse, family/social relationships are unchanged, mood is unchanged sleep patterns are unchanged, and that the overall functioning is worse. Patient denies misusing/abusing her narcotic pain medications or using any illegal drugs. There are No indicators for possible drug abuse, addiction or diversion problems. Patient states has been having increase pain since the pharmacy switched the manufacture. She complains she has bene having a lot pain and the legs are hurting bad. She says her whole body has been aching. She reports she is working full time. She denies any constipation symptoms. She states she is managing her ADLs. ALLERGIES: Patients list of allergies were reviewed     MEDICATIONS: Ms. Julio Smallwood list of medications were reviewed. Her current medications are   Outpatient Medications Prior to Visit   Medication Sig Dispense Refill    diclofenac (VOLTAREN) 75 MG EC tablet TAKE ONE TABLET BY MOUTH DAILY 30 tablet 0    albuterol sulfate HFA (VENTOLIN HFA) 108 (90 Base) MCG/ACT inhaler Inhale 2 puffs into the lungs every 6 hours as needed for Wheezing 1 Inhaler 0    Fluticasone Propionate (FLONASE NA) by Nasal route       melatonin 3 MG TABS tablet Take 3 mg by mouth nightly      gabapentin (NEURONTIN) 600 MG tablet One tab hs 1 week, 2 tabs hs 1 week, 1 tab am 2 tabs pm 90 tablet 0     No facility-administered medications prior to visit. REVIEW OF SYSTEMS:    Respiratory: Negative for apnea, chest tightness and shortness of breath or change in baseline breathing. PHYSICAL EXAM:   Nursing note and vitals reviewed. BP (!) 148/84   Pulse 62   Temp 97.7 °F (36.5 °C) (Temporal)   Resp 16   Ht 5' 8\" (1.727 m)   Wt 198 lb (89.8 kg)   Breastfeeding No   BMI 30.11 kg/m²   Constitutional: She appears well-developed and well-nourished. No acute distress. Cardiovascular: Normal rate, regular rhythm, normal heart sounds, and does not have murmur. Pulmonary/Chest: Effort normal. No respiratory distress. She does not have wheezes in the lung fields. She has no rales. Neurological/Psychiatric:She is alert and oriented to person, place, and time. Coordination is  normal.  Her mood isAppropriate and affect is Neutral/Euthymic(normal) . Other: + skin tenderness   IMPRESSION:   1. Chronic pain syndrome    2. Fibromyalgia    3. 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 Primary localized osteoarthrosis of right lower leg        PLAN:  Informed verbal consent was obtained  - [place] was obtained and reviewed  for the last one year and no indicators of drug misuse  were found. Any other controlled substance prescriptions  seen on the record have been accounted for, I am aware of the patient receiving these medications. Madalyn Salazar [place] record will be rechecked as part of office protocol.    -Will try getting Tramadol at a different pharmacy   -She was advised to increase fluids ( 5-7  glasses of fluid daily), limit caffeine, avoid cheese products, increase dietary fiber, increase activity and exercise as tolerated and relax regularly and enjoy meals   -Continue with all other adjuvants as before.    Current Outpatient Medications   Medication Sig Dispense Refill    diclofenac (VOLTAREN) 75 MG EC tablet TAKE ONE TABLET BY MOUTH DAILY 30 tablet 0    albuterol sulfate HFA (VENTOLIN HFA) 108 (90 Base) MCG/ACT inhaler Inhale 2 puffs into the lungs every 6 hours as needed for Wheezing 1 Inhaler 0    Fluticasone Propionate (FLONASE NA) by Nasal route       melatonin 3 MG TABS tablet Take 3 mg by mouth nightly      gabapentin (NEURONTIN) 600 MG tablet One tab hs 1 week, 2 tabs hs 1 week, 1 tab am 2 tabs pm 90 tablet 0     No current facility-administered medications for this visit. I will continue her current medication regimen  which is part of the above treatment schedule. It has been helping with Ms. 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 Risks and benefits of the medications and other alternative treatments  including no treatment were discussed with the patient. The common side effects of these medications were also explained to the patient. Informed verbal consent was obtained. Goals of current treatment regimen include improvement in pain, restoration of functioning- with focus on improvement in physical performance, general activity, work or disability,emotional distress, health care utilization and  decreased medication consumption. Will continue to monitor progress towards achieving/maintaining therapeutic goals with special emphasis on  1. Improvement in perceived interfernce  of pain with ADL's. Ability to do home exercises independently. Ability to do household chores indoor and/or outdoor work and social and leisure activities. Improve psychosocial and physical functioning. - she is showing progression towards this treatment goal with the current regimen.     She was advised against drinking alcohol with the narcotic pain medicines, advised against driving or handling machinery while adjusting the dose of medicines or if having cognitive  issues related to the current medications. Risk of overdose and death, if medicines not taken as prescribed, were also discussed. If the patient develops new symptoms or if the symptoms worsen, the patient should call the office. While transcribing every attempt was made to maintain the accuracy of the note in terms of it's contents,there may have been some errors made inadvertently. Thank you for allowing me to participate in the care of this patient.     Pema Barrera MD.    Cc: MAGNOLIA Carreno - CNP

## 2021-10-20 ENCOUNTER — OFFICE VISIT (OUTPATIENT)
Dept: PAIN MANAGEMENT | Age: 56
End: 2021-10-20
Payer: COMMERCIAL

## 2021-10-20 VITALS
WEIGHT: 197 LBS | BODY MASS INDEX: 29.95 KG/M2 | SYSTOLIC BLOOD PRESSURE: 139 MMHG | DIASTOLIC BLOOD PRESSURE: 84 MMHG | HEART RATE: 51 BPM

## 2021-10-20 DIAGNOSIS — M79.7 FIBROMYALGIA: ICD-10-CM

## 2021-10-20 DIAGNOSIS — M54.16 LUMBAR RADICULOPATHY: ICD-10-CM

## 2021-10-20 DIAGNOSIS — T84.84XS PAIN IN PROSTHETIC JOINT, SEQUELA: ICD-10-CM

## 2021-10-20 DIAGNOSIS — G89.4 CHRONIC PAIN SYNDROME: ICD-10-CM

## 2021-10-20 DIAGNOSIS — M17.11 PRIMARY LOCALIZED OSTEOARTHROSIS OF RIGHT LOWER LEG: ICD-10-CM

## 2021-10-20 PROCEDURE — 99213 OFFICE O/P EST LOW 20 MIN: CPT | Performed by: INTERNAL MEDICINE

## 2021-10-20 RX ORDER — TRAMADOL HYDROCHLORIDE 50 MG/1
50 TABLET ORAL EVERY 6 HOURS PRN
Qty: 90 TABLET | Refills: 0 | Status: SHIPPED | OUTPATIENT
Start: 2021-10-20 | End: 2021-11-24 | Stop reason: SDUPTHER

## 2021-10-20 RX ORDER — DICLOFENAC SODIUM 75 MG/1
75 TABLET, DELAYED RELEASE ORAL DAILY PRN
Qty: 30 TABLET | Refills: 1 | Status: SHIPPED | OUTPATIENT
Start: 2021-10-20 | End: 2021-11-24 | Stop reason: SDUPTHER

## 2021-10-20 RX ORDER — GABAPENTIN 600 MG/1
TABLET ORAL
Qty: 90 TABLET | Refills: 1 | Status: SHIPPED | OUTPATIENT
Start: 2021-10-20 | End: 2021-11-24 | Stop reason: SDUPTHER

## 2021-10-20 NOTE — PROGRESS NOTES
Christine Colin  1965  <[id number]>    HISTORY OF PRESENT ILLNESS:  Ms. Shad Santiago is a 61 y.o. female returns for a follow up visit for multiple medical problems. Her current presenting problems are   1. Chronic pain syndrome    2. Fibromyalgia    3. 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 Primary localized osteoarthrosis of right lower leg    . As per information/history obtained from the PADT(patient assessment and documentation tool) - She complains of pain in the hands Right, mid back, lower back and knees Bilateral  She rates the pain 8/10 and describes it as sharp, aching, pins and needles. Pain is made worse by: movement, walking, standing, bending. Current treatment regimen has helped relieve about 40% of the pain. She denies side effects from the current pain regimen. Patient reports that since the last follow up visit the physical functioning is worse, family/social relationships are unchanged, mood is unchanged and sleep patterns are unchanged, and that the overall functioning is worse. Patient denies neurological bowel or bladder. Patient denies misusing/abusing her narcotic pain medications or using any illegal drugs. There are No indicators for possible drug abuse, addiction or diversion problems. Upon obtaining the medical history from Ms. Shad Santiago regarding today's office visit for her presenting problems, Jose Martin Boles reports she has been doing fair, She says the Ultram 2-3 per day is not helping, ( change in ). She says she is using Neurontin along with Voltaren. She mentions she is working full time. She denies any constipation symptoms. She reports her weight has been stable. ALLERGIES: Patients list of allergies were reviewed     MEDICATIONS: Ms. Shad Santiago list of medications were reviewed. Her current medications are   Outpatient Medications Prior to Visit   Medication Sig Dispense Refill    diclofenac (VOLTAREN) 75 MG EC tablet Take 1 tablet by mouth daily as needed for Pain 30 tablet 1    gabapentin (NEURONTIN) 600 MG tablet Take 1 tablet po in Am, take 2 tablets po Pm 90 tablet 1    albuterol sulfate HFA (VENTOLIN HFA) 108 (90 Base) MCG/ACT inhaler Inhale 2 puffs into the lungs every 6 hours as needed for Wheezing 1 Inhaler 0    Fluticasone Propionate (FLONASE NA) by Nasal route       melatonin 3 MG TABS tablet Take 3 mg by mouth nightly       No facility-administered medications prior to visit. REVIEW OF SYSTEMS:    Respiratory: Negative for apnea, chest tightness and shortness of breath or change in baseline breathing. PHYSICAL EXAM:   Nursing note and vitals reviewed. /84   Pulse 51   Wt 197 lb (89.4 kg)   Breastfeeding No   BMI 29.95 kg/m²   Constitutional: She appears well-developed and well-nourished. No acute distress. Cardiovascular: Normal rate, regular rhythm, normal heart sounds, and does not have murmur. Pulmonary/Chest: Effort normal. No respiratory distress. She does not have wheezes in the lung fields. She has no rales. Neurological/Psychiatric:She is alert and oriented t  IMPRESSION:   1. Chronic pain syndrome    2. Fibromyalgia    3. 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 Primary localized osteoarthrosis of right lower leg        PLAN:  Informed verbal consent was obtained  -[place] record was obtained and reviewed  for the last one year and no indicators of drug misuse  were found. Any other controlled substance prescriptions  seen on the record have been accounted for, I am aware of the patient receiving these medications. Christel Dalal [place] will be rechecked as part of office protocol.     -ROM/Stretching exercises as advised   -She was advised to increase fluids ( 5-7  glasses of fluid daily), limit caffeine, avoid cheese products, increase dietary fiber, increase activity and exercise as tolerated and relax regularly and enjoy meals   -Walking/stretching exercises as treatment goal with the current regimen. She was advised against drinking alcohol with the narcotic pain medicines, advised against driving or handling machinery while adjusting the dose of medicines or if having cognitive  issues related to the current medications. Risk of overdose and death, if medicines not taken as prescribed, were also discussed. If the patient develops new symptoms or if the symptoms worsen, the patient should call the office. While transcribing every attempt was made to maintain the accuracy of the note in terms of it's contents,there may have been some errors made inadvertently. Thank you for allowing me to participate in the care of this patient.     Romayne Amsterdam, MD.    Cc: Polly Aguilera, MAGNOLIA - CNP

## 2021-11-24 ENCOUNTER — OFFICE VISIT (OUTPATIENT)
Dept: PAIN MANAGEMENT | Age: 56
End: 2021-11-24
Payer: COMMERCIAL

## 2021-11-24 VITALS
HEIGHT: 68 IN | DIASTOLIC BLOOD PRESSURE: 84 MMHG | WEIGHT: 189 LBS | BODY MASS INDEX: 28.64 KG/M2 | HEART RATE: 67 BPM | SYSTOLIC BLOOD PRESSURE: 136 MMHG | RESPIRATION RATE: 16 BRPM | OXYGEN SATURATION: 100 %

## 2021-11-24 DIAGNOSIS — M17.11 PRIMARY LOCALIZED OSTEOARTHROSIS OF RIGHT LOWER LEG: ICD-10-CM

## 2021-11-24 DIAGNOSIS — M54.16 LUMBAR RADICULOPATHY: ICD-10-CM

## 2021-11-24 DIAGNOSIS — G89.4 CHRONIC PAIN SYNDROME: ICD-10-CM

## 2021-11-24 DIAGNOSIS — M79.7 FIBROMYALGIA: ICD-10-CM

## 2021-11-24 DIAGNOSIS — T84.84XS PAIN IN PROSTHETIC JOINT, SEQUELA: ICD-10-CM

## 2021-11-24 PROCEDURE — 99213 OFFICE O/P EST LOW 20 MIN: CPT | Performed by: INTERNAL MEDICINE

## 2021-11-24 RX ORDER — DICLOFENAC SODIUM 75 MG/1
75 TABLET, DELAYED RELEASE ORAL DAILY PRN
Qty: 30 TABLET | Refills: 1 | Status: SHIPPED | OUTPATIENT
Start: 2021-11-24 | End: 2021-12-29 | Stop reason: SDUPTHER

## 2021-11-24 RX ORDER — TRAMADOL HYDROCHLORIDE 50 MG/1
50 TABLET ORAL EVERY 6 HOURS PRN
Qty: 90 TABLET | Refills: 0 | Status: SHIPPED | OUTPATIENT
Start: 2021-11-24 | End: 2021-12-29 | Stop reason: SDUPTHER

## 2021-11-24 RX ORDER — GABAPENTIN 600 MG/1
TABLET ORAL
Qty: 90 TABLET | Refills: 1 | Status: SHIPPED | OUTPATIENT
Start: 2021-11-24 | End: 2022-04-13 | Stop reason: SDUPTHER

## 2021-11-24 NOTE — PROGRESS NOTES
Darren Gates  1965  <[id number]>      HISTORY OF PRESENT ILLNESS:  Ms. Kari Romero is a 61 y.o. female returns for a follow up visit for pain management  She has a diagnosis of   1. Chronic pain syndrome    2. Fibromyalgia    3. 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 Primary localized osteoarthrosis of right lower leg    . She complains of pain in the bilateral hands, bilateral knees  She rates the pain 7/10 and describes it as aching, burning. Current treatment regimen has helped relieve about 60% of the pain. She denies any side effects from the current pain regimen. Patient reports that since the last follow up visit the physical functioning is unchanged, family/social relationships are unchanged, mood is unchanged sleep patterns are unchanged, and that the overall functioning is unchanged. Patient denies misusing/abusing her narcotic pain medications or using any illegal drugs. There are No indicators for possible drug abuse, addiction or diversion problems, patient states she has been doing fair. Ms. Kari Romero reports she is working 40 hours a week. She mentions she is using Ultram along with the other adjuvants. Patient says she is on Voltaren and Neurontin. Patient is complaining of her knees and lower back in the worst. Patient reports her weight has been stable. ALLERGIES: Patients list of allergies were reviewed     MEDICATIONS: Ms. Kari Romero list of medications were reviewed. Her current medications are   Outpatient Medications Prior to Visit   Medication Sig Dispense Refill    diclofenac (VOLTAREN) 75 MG EC tablet Take 1 tablet by mouth daily as needed for Pain 30 tablet 1    gabapentin (NEURONTIN) 600 MG tablet Take 1 tablet po in Am, take 2 tablets po Pm 90 tablet 1    albuterol sulfate HFA (VENTOLIN HFA) 108 (90 Base) MCG/ACT inhaler Inhale 2 puffs into the lungs every 6 hours as needed for Wheezing 1 Inhaler 0    Fluticasone Propionate (FLONASE NA) by Nasal route       melatonin 3 MG TABS tablet Take 3 mg by mouth nightly       No facility-administered medications prior to visit. REVIEW OF SYSTEMS:    Respiratory: Negative for apnea, chest tightness and shortness of breath or change in baseline breathing. PHYSICAL EXAM:   Nursing note and vitals reviewed. /84   Pulse 67   Resp 16   Ht 5' 8\" (1.727 m)   Wt 189 lb (85.7 kg)   SpO2 100%   BMI 28.74 kg/m²   Constitutional: She appears well-developed and well-nourished. No acute distress. Cardiovascular: Normal rate, regular rhythm, normal heart sounds, and does not have murmur. Pulmonary/Chest: Effort normal. No respiratory distress. She does not have wheezes in the lung fields. She has no rales. Neurological/Psychiatric:She is alert and oriented to person, place, and time. Coordination is  normal.  Her mood isAppropriate and affect is Neutral/Euthymic(normal) . Her    IMPRESSION:   1. Chronic pain syndrome    2. Fibromyalgia    3. 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 Primary localized osteoarthrosis of right lower leg        PLAN:  Informed verbal consent was obtained  -[place] record was obtained and reviewed  for the last one year and no indicators of drug misuse  were found. Any other controlled substance prescriptions  seen on the record have been accounted for, I am aware of the patient receiving these medications. Sudheer Sethi  [place] record will be rechecked as part of office protocol.    -Urine drug screen with GC/MS for opiates and drugs of abuse was ordered and will follow up on results.   -Continue with Ultram 2-3 per day  -She was advised to increase fluids ( 5-7  glasses of fluid daily), limit caffeine, avoid cheese products, increase dietary fiber, increase activity and exercise as tolerated and relax regularly and enjoy meals   -Walking 20 minutes daily as tolerated      Current Outpatient Medications   Medication Sig Dispense Refill    diclofenac (VOLTAREN) 75 MG EC tablet Take 1 tablet by mouth daily as needed for Pain 30 tablet 1    gabapentin (NEURONTIN) 600 MG tablet Take 1 tablet po in Am, take 2 tablets po Pm 90 tablet 1    albuterol sulfate HFA (VENTOLIN HFA) 108 (90 Base) MCG/ACT inhaler Inhale 2 puffs into the lungs every 6 hours as needed for Wheezing 1 Inhaler 0    Fluticasone Propionate (FLONASE NA) by Nasal route       melatonin 3 MG TABS tablet Take 3 mg by mouth nightly       No current facility-administered medications for this visit. I will continue her current medication regimen  which is part of the above treatment schedule. It has been helping with Ms. 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 Risks and benefits of the medications and other alternative treatments  including no treatment were discussed with the patient. The common side effects of these medications were also explained to the patient. Informed verbal consent was obtained. Goals of current treatment regimen include improvement in pain, restoration of functioning- with focus on improvement in physical performance, general activity, work or disability,emotional distress, health care utilization and  decreased medication consumption. Will continue to monitor progress towards achieving/maintaining therapeutic goals with special emphasis on  1. Improvement in perceived interfernce  of pain with ADL's. Ability to do home exercises independently. Ability to do household chores indoor and/or outdoor work and social and leisure activities. Improve psychosocial and physical functioning. - she is showing progression towards this treatment goal with the current regimen.     She was advised against drinking alcohol with the narcotic pain medicines, advised against driving or handling machinery while adjusting the dose of medicines or if having cognitive  issues related to the current medications. Risk of overdose and death, if medicines not taken as prescribed, were also discussed. If the patient develops new symptoms or if the symptoms worsen, the patient should call the office. While transcribing every attempt was made to maintain the accuracy of the note in terms of it's contents,there may have been some errors made inadvertently. Thank you for allowing me to participate in the care of this patient.     Gomez Garcia MD.    Cc: MAGNOLIA Ellison - CNP

## 2021-12-29 ENCOUNTER — OFFICE VISIT (OUTPATIENT)
Dept: PAIN MANAGEMENT | Age: 56
End: 2021-12-29
Payer: COMMERCIAL

## 2021-12-29 VITALS
SYSTOLIC BLOOD PRESSURE: 124 MMHG | BODY MASS INDEX: 29.19 KG/M2 | WEIGHT: 192 LBS | HEART RATE: 65 BPM | DIASTOLIC BLOOD PRESSURE: 84 MMHG

## 2021-12-29 DIAGNOSIS — G89.4 CHRONIC PAIN SYNDROME: ICD-10-CM

## 2021-12-29 DIAGNOSIS — M79.7 FIBROMYALGIA: ICD-10-CM

## 2021-12-29 DIAGNOSIS — M17.11 PRIMARY LOCALIZED OSTEOARTHROSIS OF RIGHT LOWER LEG: ICD-10-CM

## 2021-12-29 DIAGNOSIS — T84.84XS PAIN IN PROSTHETIC JOINT, SEQUELA: ICD-10-CM

## 2021-12-29 DIAGNOSIS — M54.16 LUMBAR RADICULOPATHY: ICD-10-CM

## 2021-12-29 PROCEDURE — 99213 OFFICE O/P EST LOW 20 MIN: CPT | Performed by: INTERNAL MEDICINE

## 2021-12-29 RX ORDER — TRAMADOL HYDROCHLORIDE 50 MG/1
50 TABLET ORAL EVERY 6 HOURS PRN
Qty: 90 TABLET | Refills: 0 | Status: SHIPPED | OUTPATIENT
Start: 2021-12-29 | End: 2022-02-02 | Stop reason: SDUPTHER

## 2021-12-29 RX ORDER — DICLOFENAC SODIUM 75 MG/1
75 TABLET, DELAYED RELEASE ORAL DAILY PRN
Qty: 30 TABLET | Refills: 1 | Status: SHIPPED | OUTPATIENT
Start: 2021-12-29 | End: 2022-02-02 | Stop reason: SDUPTHER

## 2021-12-29 NOTE — PROGRESS NOTES
Kelly Trent  1965  <[id number]>    HISTORY OF PRESENT ILLNESS:  Ms. Martha Rivera is a 61 y.o. female returns for a follow up visit for multiple medical problems. Her current presenting problems are   1. Chronic pain syndrome    2. Fibromyalgia    3. 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 Primary localized osteoarthrosis of right lower leg    6. Primary insomnia    7. Mood disorder ([facility] [address]    . As per information/history obtained from the PADT(patient assessment and documentation tool) - She complains of pain in the lower back with radiation to the elbows Bilateral, hands Bilateral and knees Bilateral She rates the pain 7/10 and describes it as aching, pins and needles. Pain is made worse by: walking, standing, bending. Current treatment regimen has helped relieve about 70% of the pain. She denies side effects from the current pain regimen. Patient reports that since the last follow up visit the physical functioning is unchanged, family/social relationships are unchanged, mood is unchanged and sleep patterns are unchanged, and that the overall functioning is unchanged. Patient denies neurological bowel or bladder. Patient denies misusing/abusing her narcotic pain medications or using any illegal drugs. There are No indicators for possible drug abuse, addiction or diversion problems. Upon obtaining the medical history from Ms. Martha Rivera regarding today's office visit for her presenting problems, patient states she has been doing fair. Ms. Martha Rivera reports she has been working 40 hours a week. She mentions she is using Ultram along with the other adjuvants, she denies any side effects with it. She reports she has been managing her ADL's. Patient denies any constipation symptoms. ALLERGIES: Patients list of allergies were reviewed     MEDICATIONS: Ms. Martha Rivera list of medications were reviewed. Her current medications are   Outpatient Medications Prior to Visit   Medication Sig Dispense Refill    diclofenac (VOLTAREN) 75 MG EC tablet Take 1 tablet by mouth daily as needed for Pain 30 tablet 1    gabapentin (NEURONTIN) 600 MG tablet Take 1 tablet po in Am, take 2 tablets po Pm 90 tablet 1    traMADol (ULTRAM) 50 MG tablet Take 1 tablet by mouth every 6 hours as needed for Pain (max 2-3 per day) for up to 30 days. 90 tablet 0    albuterol sulfate HFA (VENTOLIN HFA) 108 (90 Base) MCG/ACT inhaler Inhale 2 puffs into the lungs every 6 hours as needed for Wheezing 1 Inhaler 0    Fluticasone Propionate (FLONASE NA) by Nasal route       melatonin 3 MG TABS tablet Take 3 mg by mouth nightly       No facility-administered medications prior to visit. REVIEW OF SYSTEMS:    Respiratory: Negative for apnea, chest tightness and shortness of breath or change in baseline breathing. PHYSICAL EXAM:   Nursing note and vitals reviewed. /84   Pulse 65   Wt 192 lb (87.1 kg)   BMI 29.19 kg/m²   Constitutional: She appears well-developed and well-nourished. No acute distress. Cardiovascular: Normal rate, regular rhythm, normal heart sounds, and does not have murmur. Pulmonary/Chest: Effort normal. No respiratory distress. She does not have wheezes in the lung fields. She has no rales. Neurological/Psychiatric:She is alert and oriented to person, place, and time. Coordination is  normal.  Her mood isAppropriate and affect is Neutral/Euthymic(normal) . IMPRESSION:   1. Chronic pain syndrome    2. Fibromyalgia    3. 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 Primary localized osteoarthrosis of right lower leg        PLAN:  Informed verbal consent was obtained  -[place] record was obtained and reviewed  for the last one year and no indicators of drug misuse  were found. Any other controlled substance prescriptions  seen on the record have been accounted for, I am aware of the patient receiving these medications. Maxime Wilder  [place] record will be rechecked as part of office protocol. -ROM/Stretching exercises as advised   -Continue with Ultram 2-3 per day  -Patient's urine drug screen results with GC/MS confirmation were obtained and reviewed and were negative for any illicit drugs. Prescribed medications were within acceptable range.   -Continue with all other adjuvant medications as before      Current Outpatient Medications   Medication Sig Dispense Refill    diclofenac (VOLTAREN) 75 MG EC tablet Take 1 tablet by mouth daily as needed for Pain 30 tablet 1    gabapentin (NEURONTIN) 600 MG tablet Take 1 tablet po in Am, take 2 tablets po Pm 90 tablet 1    albuterol sulfate HFA (VENTOLIN HFA) 108 (90 Base) MCG/ACT inhaler Inhale 2 puffs into the lungs every 6 hours as needed for Wheezing 1 Inhaler 0    Fluticasone Propionate (FLONASE NA) by Nasal route       melatonin 3 MG TABS tablet Take 3 mg by mouth nightly       No current facility-administered medications for this visit. I will continue her current medication regimen  which is part of the above treatment schedule. It has been helping with Ms. 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 Risks and benefits of the medications and other alternative treatments  including no treatment were discussed with the patient. The common side effects of these medications were also explained to the patient. Informed verbal consent was obtained. Goals of current treatment regimen include improvement in pain, restoration of functioning- with focus on improvement in physical performance, general activity, work or disability,emotional distress, health care utilization and  decreased medication consumption.  Will continue to monitor progress towards achieving/maintaining therapeutic goals with special emphasis on  1. Improvement in perceived interfernce  of pain with ADL's. Ability to do home exercises independently. Ability to do household chores indoor and/or outdoor work and social and leisure activities. Improve psychosocial and physical functioning. - she is showing progression towards this treatment goal with the current regimen. She was advised against drinking alcohol with the narcotic pain medicines, advised against driving or handling machinery while adjusting the dose of medicines or if having cognitive  issues related to the current medications. Risk of overdose and death, if medicines not taken as prescribed, were also discussed. If the patient develops new symptoms or if the symptoms worsen, the patient should call the office. While transcribing every attempt was made to maintain the accuracy of the note in terms of it's contents,there may have been some errors made inadvertently. Thank you for allowing me to participate in the care of this patient.     Emile Carlton MD.    Cc: Hu Murray, MAGNOLIA - CNP

## 2022-02-02 ENCOUNTER — OFFICE VISIT (OUTPATIENT)
Dept: PAIN MANAGEMENT | Age: 57
End: 2022-02-02
Payer: COMMERCIAL

## 2022-02-02 VITALS
HEIGHT: 68 IN | SYSTOLIC BLOOD PRESSURE: 138 MMHG | DIASTOLIC BLOOD PRESSURE: 77 MMHG | BODY MASS INDEX: 30.01 KG/M2 | HEART RATE: 62 BPM | RESPIRATION RATE: 16 BRPM | OXYGEN SATURATION: 97 % | WEIGHT: 198 LBS

## 2022-02-02 DIAGNOSIS — T84.84XS PAIN IN PROSTHETIC JOINT, SEQUELA: ICD-10-CM

## 2022-02-02 DIAGNOSIS — M17.11 PRIMARY LOCALIZED OSTEOARTHROSIS OF RIGHT LOWER LEG: ICD-10-CM

## 2022-02-02 DIAGNOSIS — M79.7 FIBROMYALGIA: ICD-10-CM

## 2022-02-02 DIAGNOSIS — M54.16 LUMBAR RADICULOPATHY: ICD-10-CM

## 2022-02-02 DIAGNOSIS — G89.4 CHRONIC PAIN SYNDROME: ICD-10-CM

## 2022-02-02 PROCEDURE — 99213 OFFICE O/P EST LOW 20 MIN: CPT | Performed by: INTERNAL MEDICINE

## 2022-02-02 RX ORDER — DICLOFENAC SODIUM 75 MG/1
75 TABLET, DELAYED RELEASE ORAL DAILY PRN
Qty: 30 TABLET | Refills: 1 | Status: SHIPPED | OUTPATIENT
Start: 2022-02-02 | End: 2022-03-09 | Stop reason: SDUPTHER

## 2022-02-02 RX ORDER — TRAMADOL HYDROCHLORIDE 50 MG/1
50 TABLET ORAL EVERY 6 HOURS PRN
Qty: 90 TABLET | Refills: 0 | Status: SHIPPED | OUTPATIENT
Start: 2022-02-02 | End: 2022-03-09 | Stop reason: SDUPTHER

## 2022-02-02 NOTE — PROGRESS NOTES
medications are   Outpatient Medications Prior to Visit   Medication Sig Dispense Refill    albuterol sulfate HFA (VENTOLIN HFA) 108 (90 Base) MCG/ACT inhaler Inhale 2 puffs into the lungs every 6 hours as needed for Wheezing 1 Inhaler 0    Fluticasone Propionate (FLONASE NA) by Nasal route       melatonin 3 MG TABS tablet Take 3 mg by mouth nightly      diclofenac (VOLTAREN) 75 MG EC tablet Take 1 tablet by mouth daily as needed for Pain 30 tablet 1    gabapentin (NEURONTIN) 600 MG tablet Take 1 tablet po in Am, take 2 tablets po Pm 90 tablet 1     No facility-administered medications prior to visit. REVIEW OF SYSTEMS:    Respiratory: Negative for apnea, chest tightness and shortness of breath or change in baseline breathing. PHYSICAL EXAM:   Nursing note and vitals reviewed. /77   Pulse 62   Resp 16   Ht 5' 8\" (1.727 m)   Wt 198 lb (89.8 kg)   SpO2 97%   Breastfeeding No   BMI 30.11 kg/m²   Constitutional: She appears well-developed and well-nourished. No acute distress. Cardiovascular: Normal rate, regular rhythm, normal heart sounds, and does not have murmur. Pulmonary/Chest: Effort normal. No respiratory distress. She does not have wheezes in the lung fields. She has no rales. Neurological/Psychiatric:She is alert and oriented to person, place, and time. Coordination is  normal.  Her mood isAppropriate and affect is Neutral/Euthymic(normal) . IMPRESSION:   1. Chronic pain syndrome    2. Lumbar radiculopathy    3. Primary localized osteoarthrosis of right lower leg    4. Fibromyalgia    5. 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 Any other controlled substance prescriptions  seen on the record have been accounted for, I am aware of the patient receiving these medications. Jazz Berman  [place] record will be rechecked as part of office protocol. -ROM/Stretching exercises as advised   -She was advised to increase fluids ( 5-7  glasses of fluid daily), limit caffeine, avoid cheese products, increase dietary fiber, increase activity and exercise as tolerated and relax regularly and enjoy meals   -Continue with Ultram 2-3 per day  -Walking as tolerated       Current Outpatient Medications   Medication Sig Dispense Refill    diclofenac (VOLTAREN) 75 MG EC tablet Take 1 tablet by mouth daily as needed for Pain 30 tablet 1    traMADol (ULTRAM) 50 MG tablet Take 1 tablet by mouth every 6 hours as needed for Pain (max 2-3 per day) for up to 30 days. 90 tablet 0    albuterol sulfate HFA (VENTOLIN HFA) 108 (90 Base) MCG/ACT inhaler Inhale 2 puffs into the lungs every 6 hours as needed for Wheezing 1 Inhaler 0    Fluticasone Propionate (FLONASE NA) by Nasal route       melatonin 3 MG TABS tablet Take 3 mg by mouth nightly      gabapentin (NEURONTIN) 600 MG tablet Take 1 tablet po in Am, take 2 tablets po Pm 90 tablet 1     No current facility-administered medications for this visit. I will continue her current medication regimen  which is part of the above treatment schedule. It has been helping with Ms. 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 Risks and benefits of the medications and other alternative treatments  including no treatment were discussed with the patient. The common side effects of these medications were also explained to the patient. Informed verbal consent was obtained.    Goals of current treatment regimen include improvement in pain, restoration of functioning- with focus on improvement in physical performance, general activity, work or disability,emotional distress, health care utilization and  decreased opioid medication consumption- titrating to the lowest effective dose. Will continue to monitor progress towards achieving/maintaining therapeutic goals with special emphasis on  1. Improvement in perceived interfernce  of pain with ADL's. Ability to do home exercises independently. Ability to do household chores indoor and/or outdoor work and social and leisure activities. Improve psychosocial and physical functioning. - she is showing progression towards this treatment goal with the current regimen. She was advised against drinking alcohol with the narcotic pain medicines, advised against driving or handling machinery while adjusting the dose of medicines or if having cognitive  issues related to the current medications. Risk of overdose and death, if medicines not taken as prescribed, were also discussed. If the patient develops new symptoms or if the symptoms worsen, the patient should call the office. While transcribing every attempt was made to maintain the accuracy of the note in terms of it's contents,there may have been some errors made inadvertently. Thank you for allowing me to participate in the care of this patient.     Billy Coker MD.    Cc: MAGNOLIA Vasquez - CNP

## 2022-03-09 ENCOUNTER — OFFICE VISIT (OUTPATIENT)
Dept: PAIN MANAGEMENT | Age: 57
End: 2022-03-09
Payer: COMMERCIAL

## 2022-03-09 VITALS
WEIGHT: 204 LBS | DIASTOLIC BLOOD PRESSURE: 75 MMHG | BODY MASS INDEX: 31.02 KG/M2 | SYSTOLIC BLOOD PRESSURE: 126 MMHG | HEART RATE: 58 BPM

## 2022-03-09 DIAGNOSIS — G89.4 CHRONIC PAIN SYNDROME: ICD-10-CM

## 2022-03-09 DIAGNOSIS — T84.84XS PAIN IN PROSTHETIC JOINT, SEQUELA: ICD-10-CM

## 2022-03-09 DIAGNOSIS — M17.11 PRIMARY LOCALIZED OSTEOARTHROSIS OF RIGHT LOWER LEG: ICD-10-CM

## 2022-03-09 DIAGNOSIS — M79.7 FIBROMYALGIA: ICD-10-CM

## 2022-03-09 DIAGNOSIS — M54.16 LUMBAR RADICULOPATHY: ICD-10-CM

## 2022-03-09 PROCEDURE — 99213 OFFICE O/P EST LOW 20 MIN: CPT | Performed by: INTERNAL MEDICINE

## 2022-03-09 RX ORDER — DICLOFENAC SODIUM 75 MG/1
75 TABLET, DELAYED RELEASE ORAL DAILY PRN
Qty: 30 TABLET | Refills: 1 | Status: SHIPPED | OUTPATIENT
Start: 2022-03-09 | End: 2022-04-13 | Stop reason: SDUPTHER

## 2022-03-09 RX ORDER — TRAMADOL HYDROCHLORIDE 50 MG/1
50 TABLET ORAL EVERY 6 HOURS PRN
Qty: 90 TABLET | Refills: 0 | Status: SHIPPED | OUTPATIENT
Start: 2022-03-09 | End: 2022-04-13 | Stop reason: SDUPTHER

## 2022-03-09 NOTE — PROGRESS NOTES
Sonia Too  1965  <[id number]>    HISTORY OF PRESENT ILLNESS:  Ms. Scar Vaughn is a 61 y.o. female returns for a follow up visit for multiple medical problems. Her current presenting problems are   1. Chronic pain syndrome    2. Lumbar radiculopathy    3. Primary localized osteoarthrosis of right lower leg    4. 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 As per information/history obtained from the PADT(patient assessment and documentation tool) - She complains of pain in the hands Right, mid back, lower back and knees Bilateral She rates the pain 7/10 and describes it as sharp, aching, numbness. Pain is made worse by: movement, walking, standing, bending, lifting. Current treatment regimen has helped relieve about 70% of the pain. She denies side effects from the current pain regimen. Patient reports that since the last follow up visit the physical functioning is unchanged, family/social relationships are unchanged, mood is unchanged and sleep patterns are unchanged, and that the overall functioning is unchanged. Patient denies neurological bowel or bladder. Patient denies misusing/abusing her narcotic pain medications or using any illegal drugs. There are No indicators for possible drug abuse, addiction or diversion problems. Upon obtaining the medical history from Ms. Scar Vaughn regarding today's office visit for her presenting problems, patient states she is having increase pain in the right knee and legs the last few day. She complains of increased pain with changes in weather. Extreme temperatures- cold and damp weather causes increased pain. . She says she is using Voltaren along with Neurontin and Ultram 2-3 per day. She reports she is working 8 hrs per day. She mentions status post right TKR. ALLERGIES: Patients list of allergies were reviewed     MEDICATIONS: Ms. Scar Vaughn list of medications were reviewed. Her current medications are   Outpatient Medications Prior to Visit Medication Sig Dispense Refill    diclofenac (VOLTAREN) 75 MG EC tablet Take 1 tablet by mouth daily as needed for Pain 30 tablet 1    albuterol sulfate HFA (VENTOLIN HFA) 108 (90 Base) MCG/ACT inhaler Inhale 2 puffs into the lungs every 6 hours as needed for Wheezing 1 Inhaler 0    Fluticasone Propionate (FLONASE NA) by Nasal route       melatonin 3 MG TABS tablet Take 3 mg by mouth nightly      gabapentin (NEURONTIN) 600 MG tablet Take 1 tablet po in Am, take 2 tablets po Pm 90 tablet 1     No facility-administered medications prior to visit. REVIEW OF SYSTEMS:    Respiratory: Negative for apnea, chest tightness and shortness of breath or change in baseline breathing. PHYSICAL EXAM:   Nursing note and vitals reviewed. /75   Pulse 58   Wt 204 lb (92.5 kg)   Breastfeeding No   BMI 31.02 kg/m²   Constitutional: She appears well-developed and well-nourished. No acute distress. Cardiovascular: Normal rate, regular rhythm, normal heart sounds, and does not have murmur. Pulmonary/Chest: Effort normal. No respiratory distress. She does not have wheezes in the lung fields. She has no rales. Neurological/Psychiatric:She is alert and oriented to person, place, and time. Coordination is  normal.  Her mood isAppropriate and affect is Neutral/Euthymic(normal) . IMPRESSION:   1. Chronic pain syndrome    2. Lumbar radiculopathy    3. Primary localized osteoarthrosis of right lower leg    4. Fibromyalgia    5.  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, sequela        PLAN:  Informed verbal consent was obtained  -ROM/Stretching exercises for right knee   -Continue with Neurontin along with Ultram 2-3 per day   -She was advised weight reduction, diet changes- 800-1200 matthew diet, diet diary, exercising, nutritional  consult increased physical activity as tolerated   -use ice and continue with Voltaren    Current Outpatient Medications   Medication Sig Dispense Refill    diclofenac (VOLTAREN) 75 MG EC tablet Take 1 tablet by mouth daily as needed for Pain 30 tablet 1    albuterol sulfate HFA (VENTOLIN HFA) 108 (90 Base) MCG/ACT inhaler Inhale 2 puffs into the lungs every 6 hours as needed for Wheezing 1 Inhaler 0    Fluticasone Propionate (FLONASE NA) by Nasal route       melatonin 3 MG TABS tablet Take 3 mg by mouth nightly      gabapentin (NEURONTIN) 600 MG tablet Take 1 tablet po in Am, take 2 tablets po Pm 90 tablet 1     No current facility-administered medications for this visit. I will continue her current medication regimen  which is part of the above treatment schedule. It has been helping with Ms. 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 Risks and benefits of the medications and other alternative treatments  including no treatment were discussed with the patient. The common side effects of these medications were also explained to the patient. Informed verbal consent was obtained. Goals of current treatment regimen include improvement in pain, restoration of functioning- with focus on improvement in physical performance, general activity, work or disability,emotional distress, health care utilization and  decreased opioid medication consumption- titrating to the lowest effective dose. Will continue to monitor progress towards achieving/maintaining therapeutic goals with special emphasis on  1. Improvement in perceived interfernce  of pain with ADL's. Ability to do home exercises independently. Ability to do household chores indoor and/or outdoor work and social and leisure activities. Improve psychosocial and physical functioning. - she is showing progression towards this treatment goal with the current regimen.    2. Ability to focus/concentrate at work and perform the duties required of her at work  Sit through a workday without lower extremity symptoms. Stand 30-60 minutes without lower extremity symptoms. Ability to lift up to 10-20 lbs. Ability to go up and down stairs. Sit 30-60 minutes  Without having to stand up frequently. - she is maintaining/progressing towards her work related goals with the current regimen. She was advised against drinking alcohol with the narcotic pain medicines, advised against driving or handling machinery while adjusting the dose of medicines or if having cognitive  issues related to the current medications. Risk of overdose and death, if medicines not taken as prescribed, were also discussed. If the patient develops new symptoms or if the symptoms worsen, the patient should call the office. While transcribing every attempt was made to maintain the accuracy of the note in terms of it's contents,there may have been some errors made inadvertently. Thank you for allowing me to participate in the care of this patient.     Cinthya Zavala MD.    Cc: Fabrizio Downs, APRN - CNP

## 2022-04-13 ENCOUNTER — OFFICE VISIT (OUTPATIENT)
Dept: PAIN MANAGEMENT | Age: 57
End: 2022-04-13
Payer: COMMERCIAL

## 2022-04-13 VITALS
HEIGHT: 68 IN | WEIGHT: 196 LBS | BODY MASS INDEX: 29.7 KG/M2 | HEART RATE: 52 BPM | SYSTOLIC BLOOD PRESSURE: 165 MMHG | DIASTOLIC BLOOD PRESSURE: 94 MMHG

## 2022-04-13 DIAGNOSIS — M79.7 FIBROMYALGIA: ICD-10-CM

## 2022-04-13 DIAGNOSIS — G89.4 CHRONIC PAIN SYNDROME: ICD-10-CM

## 2022-04-13 DIAGNOSIS — M17.11 PRIMARY LOCALIZED OSTEOARTHROSIS OF RIGHT LOWER LEG: ICD-10-CM

## 2022-04-13 DIAGNOSIS — T84.84XS PAIN IN PROSTHETIC JOINT, SEQUELA: ICD-10-CM

## 2022-04-13 DIAGNOSIS — M54.16 LUMBAR RADICULOPATHY: ICD-10-CM

## 2022-04-13 PROCEDURE — 99213 OFFICE O/P EST LOW 20 MIN: CPT | Performed by: INTERNAL MEDICINE

## 2022-04-13 RX ORDER — GABAPENTIN 600 MG/1
TABLET ORAL
Qty: 60 TABLET | Refills: 1 | Status: SHIPPED | OUTPATIENT
Start: 2022-04-13 | End: 2022-05-18 | Stop reason: SDUPTHER

## 2022-04-13 RX ORDER — TRAMADOL HYDROCHLORIDE 50 MG/1
50 TABLET ORAL EVERY 6 HOURS PRN
Qty: 90 TABLET | Refills: 0 | Status: SHIPPED | OUTPATIENT
Start: 2022-04-13 | End: 2022-05-18 | Stop reason: SDUPTHER

## 2022-04-13 RX ORDER — DICLOFENAC SODIUM 75 MG/1
75 TABLET, DELAYED RELEASE ORAL DAILY PRN
Qty: 30 TABLET | Refills: 1 | Status: SHIPPED | OUTPATIENT
Start: 2022-04-13 | End: 2022-05-18 | Stop reason: SDUPTHER

## 2022-04-13 NOTE — PROGRESS NOTES
Ambar Licha  1965  [id number]      HISTORY OF PRESENT ILLNESS:  Ms. Adriana Solorzano is a 61 y.o. female returns for a follow up visit for pain management  She has a diagnosis of   1. Chronic pain syndrome    2. Fibromyalgia    3. Mood disorder ([address]    4. 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 Primary localized osteoarthrosis of right lower leg    7. Primary insomnia    . She complains of pain in the Low back, Bilateral hands, bilateral knees  She rates the pain 7/10 and describes it as sharp, aching, burning. Current treatment regimen has helped relieve about 70% of the pain. She denies any side effects from the current pain regimen. Patient reports that since the last follow up visit the physical functioning is unchanged, family/social relationships are unchanged, mood is unchanged sleep patterns are unchanged, and that the overall functioning is unchanged. Patient denies misusing/abusing her narcotic pain medications or using any illegal drugs. There are No indicators for possible drug abuse, addiction or diversion problems. Patient reports she has been doing fair. She says the last week the back went out and locked up. She mentions its getting better. She says she is using Voltaren along with Ultram 2-3 per ay and Neurontin. She mentions she is working full time. She denies any constipation symptoms. ALLERGIES: Patients list of allergies were reviewed     MEDICATIONS: Ms. Adriana Solorzano list of medications were reviewed. Her current medications are   Outpatient Medications Prior to Visit   Medication Sig Dispense Refill    albuterol sulfate HFA (VENTOLIN HFA) 108 (90 Base) MCG/ACT inhaler Inhale 2 puffs into the lungs every 6 hours as needed for Wheezing 1 Inhaler 0    Fluticasone Propionate (FLONASE NA) by Nasal route       melatonin 3 MG TABS tablet Take 3 mg by mouth nightly      diclofenac (VOLTAREN) 75 MG EC tablet Take 1 tablet by mouth daily as needed for Pain 30 tablet 1    gabapentin (NEURONTIN) 600 MG tablet Take 1 tablet po in Am, take 2 tablets po Pm 90 tablet 1     No facility-administered medications prior to visit. REVIEW OF SYSTEMS:    Respiratory: Negative for apnea, chest tightness and shortness of breath or change in baseline breathing. PHYSICAL EXAM:   Nursing note and vitals reviewed. BP (!) 165/94   Pulse 52   Ht 5' 8\" (1.727 m)   Wt 196 lb (88.9 kg)   BMI 29.80 kg/m²   Constitutional: She appears well-developed and well-nourished. No acute distress. Cardiovascular: Normal rate, regular rhythm, normal heart sounds, and does not have murmur. Pulmonary/Chest: Effort normal. No respiratory distress. She does not have wheezes in the lung fields. She has no rales. Neurological/Psychiatric:She is alert and oriented to person, place, and time. Coordination is  normal.  Her mood isAppropriate and affect is Neutral/Euthymic(normal) . IMPRESSION:   1. Chronic pain syndrome    2. Fibromyalgia    3. 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 Primary localized osteoarthrosis of right lower leg        PLAN:  Informed verbal consent was obtained  -ROM/Stretching exercises as advised   -She was advised to increase fluids ( 5-7  glasses of fluid daily), limit caffeine, avoid cheese products, increase dietary fiber, increase activity and exercise as tolerated and relax regularly and enjoy meals   -Continue with Ultram 2-3 per day along with Neurontin 600 mg BID   -Continue with Voltaren   -Walking as tolerated    Current Outpatient Medications   Medication Sig Dispense Refill    diclofenac (VOLTAREN) 75 MG EC tablet Take 1 tablet by mouth daily as needed for Pain 30 tablet 1    traMADol (ULTRAM) 50 MG tablet Take 1 tablet by mouth every 6 hours as needed for Pain (max 2-3 per day) for up to 30 days.  90 tablet 0    gabapentin (NEURONTIN) 600 MG tablet Take 1 tablet po in Am, take 1 tablets po Pm 60 tablet 1    albuterol sulfate HFA (VENTOLIN HFA) 108 (90 Base) MCG/ACT inhaler Inhale 2 puffs into the lungs every 6 hours as needed for Wheezing 1 Inhaler 0    Fluticasone Propionate (FLONASE NA) by Nasal route       melatonin 3 MG TABS tablet Take 3 mg by mouth nightly       No current facility-administered medications for this visit. I will continue her current medication regimen  which is part of the above treatment schedule. It has been helping with Ms. 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 Risks and benefits of the medications and other alternative treatments  including no treatment were discussed with the patient. The common side effects of these medications were also explained to the patient. Informed verbal consent was obtained. Goals of current treatment regimen include improvement in pain, restoration of functioning- with focus on improvement in physical performance, general activity, work or disability,emotional distress, health care utilization and  decreased medication consumption. Will continue to monitor progress towards achieving/maintaining therapeutic goals with special emphasis on  1. Improvement in perceived interfernce  of pain with ADL's. Ability to do home exercises independently. Ability to do household chores indoor and/or outdoor work and social and leisure activities. Improve psychosocial and physical functioning. - she is showing progression towards this treatment goal with the current regimen. She was advised against drinking alcohol with the narcotic pain medicines, advised against driving or handling machinery while adjusting the dose of medicines or if having cognitive  issues related to the current medications. Risk of overdose and death, if medicines not taken as prescribed, were also discussed. If the patient develops new symptoms or if the symptoms worsen, the patient should call the office. While transcribing every attempt was made to maintain the accuracy of the note in terms of it's contents,there may have been some errors made inadvertently. Thank you for allowing me to participate in the care of this patient.     Nathan Falcon MD.    Cc: Cecilia Canavan, APRN - CNP

## 2022-04-26 ENCOUNTER — OFFICE VISIT (OUTPATIENT)
Dept: PRIMARY CARE CLINIC | Age: 57
End: 2022-04-26
Payer: COMMERCIAL

## 2022-04-26 VITALS
BODY MASS INDEX: 29.7 KG/M2 | WEIGHT: 196 LBS | HEIGHT: 68 IN | OXYGEN SATURATION: 98 % | SYSTOLIC BLOOD PRESSURE: 151 MMHG | HEART RATE: 69 BPM | TEMPERATURE: 97.1 F | DIASTOLIC BLOOD PRESSURE: 87 MMHG

## 2022-04-26 DIAGNOSIS — Z13.29 SCREENING FOR THYROID DISORDER: ICD-10-CM

## 2022-04-26 DIAGNOSIS — Z13.0 SCREENING FOR DEFICIENCY ANEMIA: ICD-10-CM

## 2022-04-26 DIAGNOSIS — R73.03 PREDIABETES: ICD-10-CM

## 2022-04-26 DIAGNOSIS — M54.16 LUMBAR RADICULOPATHY: ICD-10-CM

## 2022-04-26 DIAGNOSIS — F41.9 ANXIETY: ICD-10-CM

## 2022-04-26 DIAGNOSIS — M79.7 FIBROMYALGIA: ICD-10-CM

## 2022-04-26 DIAGNOSIS — G89.4 CHRONIC PAIN SYNDROME: ICD-10-CM

## 2022-04-26 DIAGNOSIS — Z12.31 ENCOUNTER FOR SCREENING MAMMOGRAM FOR MALIGNANT NEOPLASM OF BREAST: ICD-10-CM

## 2022-04-26 DIAGNOSIS — R35.0 URINARY FREQUENCY: Primary | ICD-10-CM

## 2022-04-26 LAB
A/G RATIO: 1.4 (ref 1.1–2.2)
ALBUMIN SERPL-MCNC: 4.1 G/DL (ref 3.4–5)
ALP BLD-CCNC: 97 U/L (ref 40–129)
ALT SERPL-CCNC: 13 U/L (ref 10–40)
ANION GAP SERPL CALCULATED.3IONS-SCNC: 11 MMOL/L (ref 3–16)
AST SERPL-CCNC: 15 U/L (ref 15–37)
BASOPHILS ABSOLUTE: 0 K/UL (ref 0–0.2)
BASOPHILS RELATIVE PERCENT: 0.8 %
BILIRUB SERPL-MCNC: <0.2 MG/DL (ref 0–1)
BILIRUBIN, POC: NEGATIVE
BLOOD URINE, POC: NEGATIVE
BUN BLDV-MCNC: 15 MG/DL (ref 7–20)
CALCIUM SERPL-MCNC: 9.7 MG/DL (ref 8.3–10.6)
CHLORIDE BLD-SCNC: 101 MMOL/L (ref 99–110)
CLARITY, POC: ABNORMAL
CO2: 23 MMOL/L (ref 21–32)
COLOR, POC: ABNORMAL
CREAT SERPL-MCNC: 0.8 MG/DL (ref 0.6–1.1)
EOSINOPHILS ABSOLUTE: 0.1 K/UL (ref 0–0.6)
EOSINOPHILS RELATIVE PERCENT: 1.7 %
GFR AFRICAN AMERICAN: >60
GFR NON-AFRICAN AMERICAN: >60
GLUCOSE BLD-MCNC: 137 MG/DL (ref 70–99)
GLUCOSE URINE, POC: NEGATIVE
HBA1C MFR BLD: 6.1 %
HCT VFR BLD CALC: 42.4 % (ref 36–48)
HEMOGLOBIN: 13.7 G/DL (ref 12–16)
KETONES, POC: NEGATIVE
LEUKOCYTE EST, POC: ABNORMAL
LYMPHOCYTES ABSOLUTE: 2.1 K/UL (ref 1–5.1)
LYMPHOCYTES RELATIVE PERCENT: 40 %
MCH RBC QN AUTO: 27.2 PG (ref 26–34)
MCHC RBC AUTO-ENTMCNC: 32.4 G/DL (ref 31–36)
MCV RBC AUTO: 84 FL (ref 80–100)
MONOCYTES ABSOLUTE: 0.3 K/UL (ref 0–1.3)
MONOCYTES RELATIVE PERCENT: 6.3 %
NEUTROPHILS ABSOLUTE: 2.6 K/UL (ref 1.7–7.7)
NEUTROPHILS RELATIVE PERCENT: 51.2 %
NITRITE, POC: NEGATIVE
PDW BLD-RTO: 14.5 % (ref 12.4–15.4)
PH, POC: 5.5
PLATELET # BLD: 280 K/UL (ref 135–450)
PMV BLD AUTO: 8.6 FL (ref 5–10.5)
POTASSIUM SERPL-SCNC: 4.6 MMOL/L (ref 3.5–5.1)
PROTEIN, POC: NEGATIVE
RBC # BLD: 5.05 M/UL (ref 4–5.2)
SODIUM BLD-SCNC: 135 MMOL/L (ref 136–145)
SPECIFIC GRAVITY, POC: 1.01
T4 FREE: 1.5 NG/DL (ref 0.9–1.8)
TOTAL PROTEIN: 7 G/DL (ref 6.4–8.2)
TSH REFLEX: 1.26 UIU/ML (ref 0.27–4.2)
UROBILINOGEN, POC: 0.2
WBC # BLD: 5.1 K/UL (ref 4–11)

## 2022-04-26 PROCEDURE — 83036 HEMOGLOBIN GLYCOSYLATED A1C: CPT | Performed by: NURSE PRACTITIONER

## 2022-04-26 PROCEDURE — 81002 URINALYSIS NONAUTO W/O SCOPE: CPT | Performed by: NURSE PRACTITIONER

## 2022-04-26 PROCEDURE — 99213 OFFICE O/P EST LOW 20 MIN: CPT | Performed by: NURSE PRACTITIONER

## 2022-04-26 RX ORDER — SULFAMETHOXAZOLE AND TRIMETHOPRIM 800; 160 MG/1; MG/1
1 TABLET ORAL 2 TIMES DAILY
Qty: 6 TABLET | Refills: 0 | Status: SHIPPED | OUTPATIENT
Start: 2022-04-26 | End: 2022-04-29

## 2022-04-26 RX ORDER — HYDROXYZINE HYDROCHLORIDE 25 MG/1
25 TABLET, FILM COATED ORAL EVERY 8 HOURS PRN
Qty: 90 TABLET | Refills: 3 | Status: SHIPPED | OUTPATIENT
Start: 2022-04-26 | End: 2022-05-26

## 2022-04-26 RX ORDER — ALBUTEROL SULFATE 90 UG/1
2 AEROSOL, METERED RESPIRATORY (INHALATION) EVERY 6 HOURS PRN
Qty: 1 EACH | Refills: 1 | Status: SHIPPED | OUTPATIENT
Start: 2022-04-26 | End: 2022-10-11 | Stop reason: SDUPTHER

## 2022-04-26 SDOH — ECONOMIC STABILITY: FOOD INSECURITY: WITHIN THE PAST 12 MONTHS, THE FOOD YOU BOUGHT JUST DIDN'T LAST AND YOU DIDN'T HAVE MONEY TO GET MORE.: NEVER TRUE

## 2022-04-26 SDOH — ECONOMIC STABILITY: FOOD INSECURITY: WITHIN THE PAST 12 MONTHS, YOU WORRIED THAT YOUR FOOD WOULD RUN OUT BEFORE YOU GOT MONEY TO BUY MORE.: NEVER TRUE

## 2022-04-26 ASSESSMENT — VISUAL ACUITY: OU: 1

## 2022-04-26 ASSESSMENT — PATIENT HEALTH QUESTIONNAIRE - PHQ9
SUM OF ALL RESPONSES TO PHQ QUESTIONS 1-9: 0
SUM OF ALL RESPONSES TO PHQ9 QUESTIONS 1 & 2: 0
2. FEELING DOWN, DEPRESSED OR HOPELESS: 0
SUM OF ALL RESPONSES TO PHQ QUESTIONS 1-9: 0
1. LITTLE INTEREST OR PLEASURE IN DOING THINGS: 0

## 2022-04-26 ASSESSMENT — ENCOUNTER SYMPTOMS
CONSTIPATION: 0
ABDOMINAL PAIN: 0
SHORTNESS OF BREATH: 0
DIARRHEA: 0
BACK PAIN: 1
CHEST TIGHTNESS: 0

## 2022-04-26 ASSESSMENT — SOCIAL DETERMINANTS OF HEALTH (SDOH): HOW HARD IS IT FOR YOU TO PAY FOR THE VERY BASICS LIKE FOOD, HOUSING, MEDICAL CARE, AND HEATING?: NOT VERY HARD

## 2022-04-26 NOTE — PROGRESS NOTES
Declan Pérez (:  1965) is a 61 y.o. female,Established patient, here for evaluation of the following chief complaint(s):  Abdominal Pain (cramping and frequent urination since starting medication ) and Urinary Tract Infection (possible UTI )         ASSESSMENT/PLAN:  1. Screening for deficiency anemia  -     CBC with Auto Differential; Future  2. Prediabetes- a1c improving  -     POCT glycosylated hemoglobin (Hb A1C): a1c 6.1%  -     Comprehensive Metabolic Panel; Future  -Decrease sugary foods, drinks, and starches  4. Screening for thyroid disorder  -     T4, Free; Future  -     TSH with Reflex; Future  5. Encounter for screening mammogram for malignant neoplasm of breast  -scheduled for Mammogram van  6. Fibromyalgia  -Managed by Dr. Jay Garcia, pain Specialist  7. Chronic pain syndrome  --Managed by Dr. Jay Garcia, pain Specialist  8. Lumbar radiculopathy-  --Managed by Dr. Jay Garcia, pain Specialist  9. Urinary frequency  -     POCT Urinalysis no Micro- + Vinnie  -     Culture, Urine- urine mistakenly discarded   -Complete antibiotics  10. Anxiety- Previously prescribed Lorazepam, uses intermittently,   -     hydrOXYzine (ATARAX) 25 MG tablet; Take 1 tablet by mouth every 8 hours as needed for Anxiety, Disp-90 tablet, R-3Normal  -consider referral to Psychiatry if symptoms worsen    No follow-ups on file. Ms. Favio Catherine is being followed by Dr. Jay Garcia for lumbar radiculopathy, fibromyalgia, and chronic pain syndrome. She is being prescribed:   Tramadol, diclofenac and Gabapentin  Subjective   SUBJECTIVE/OBJECTIVE:  Urinary Frequency   This is a new problem. The current episode started 1 to 4 weeks ago. The problem occurs every urination. The pain is mild. There has been no fever. Associated symptoms include frequency. Pertinent negatives include no discharge. She has tried nothing for the symptoms. 2022 treated at [facility]gent Care for cystitis, treated with Bactrim.        Anxiety  Reports increased anxiety riding in car on highway or in closed spaces. Requesting a refill on Lorazepam      Review of Systems   Constitutional: Negative for activity change and fever. HENT: Negative for congestion. Eyes: Negative for visual disturbance. Respiratory: Negative for chest tightness and shortness of breath. Cardiovascular: Negative for chest pain, palpitations and leg swelling. Gastrointestinal: Negative for abdominal pain, constipation and diarrhea. Endocrine: Negative for polyuria. Genitourinary: Positive for frequency. Negative for dysuria. Hysterectomy   Musculoskeletal: Positive for back pain. Negative for arthralgias and myalgias. Right knee arthroplasty   Skin: Negative for rash. Neurological: Positive for dizziness. Negative for light-headedness and headaches. Psychiatric/Behavioral: Negative for agitation, decreased concentration and sleep disturbance. The patient is not nervous/anxious. Anxiety          Objective     height is 5' 8\" (1.727 m) and weight is 196 lb (88.9 kg). Her temperature is 97.1 °F (36.2 °C). Her blood pressure is 151/87 (abnormal) and her pulse is 69. Her oxygen saturation is 98%. BP Readings from Last 3 Encounters:   04/26/22 (!) 151/87   04/13/22 (!) 165/94   03/09/22 126/75     Wt Readings from Last 3 Encounters:   04/26/22 196 lb (88.9 kg)   04/13/22 196 lb (88.9 kg)   03/09/22 204 lb (92.5 kg)     Physical Exam  Vitals reviewed. Constitutional:       Appearance: Normal appearance. HENT:      Head: Normocephalic. Right Ear: Hearing normal.      Left Ear: Hearing normal.   Eyes:      General: Lids are normal. Vision grossly intact. Cardiovascular:      Rate and Rhythm: Normal rate and regular rhythm. Pulmonary:      Effort: Pulmonary effort is normal.   Abdominal:      Tenderness: There is no abdominal tenderness. Neurological:      General: No focal deficit present.       Mental Status: She is alert and oriented to person, place, and time.      GCS: GCS eye subscore is 4. GCS verbal subscore is 5. GCS motor subscore is 6. Psychiatric:         Behavior: Behavior is cooperative. On this date 4/26/2022 I have spent 15 minutes reviewing previous notes, test results and face to face with the patient discussing the diagnosis and importance of compliance with the treatment plan as well as documenting on the day of the visit. An electronic signature was used to authenticate this note.     --MAGNOLIA Coronel - CNP

## 2022-04-26 NOTE — PATIENT INSTRUCTIONS
Mammogram Stefano Vo at [facility] on Tuesday June 7th at 9:45  Have labs drawn  Complete antibiotics

## 2022-05-18 ENCOUNTER — OFFICE VISIT (OUTPATIENT)
Dept: PAIN MANAGEMENT | Age: 57
End: 2022-05-18
Payer: COMMERCIAL

## 2022-05-18 VITALS
OXYGEN SATURATION: 100 % | HEART RATE: 68 BPM | HEIGHT: 68 IN | DIASTOLIC BLOOD PRESSURE: 82 MMHG | BODY MASS INDEX: 30.62 KG/M2 | SYSTOLIC BLOOD PRESSURE: 151 MMHG | WEIGHT: 202 LBS

## 2022-05-18 DIAGNOSIS — T84.84XS PAIN IN PROSTHETIC JOINT, SEQUELA: ICD-10-CM

## 2022-05-18 DIAGNOSIS — M17.11 PRIMARY LOCALIZED OSTEOARTHROSIS OF RIGHT LOWER LEG: ICD-10-CM

## 2022-05-18 DIAGNOSIS — G89.4 CHRONIC PAIN SYNDROME: ICD-10-CM

## 2022-05-18 DIAGNOSIS — M54.16 LUMBAR RADICULOPATHY: ICD-10-CM

## 2022-05-18 DIAGNOSIS — M79.7 FIBROMYALGIA: ICD-10-CM

## 2022-05-18 PROCEDURE — 99213 OFFICE O/P EST LOW 20 MIN: CPT | Performed by: INTERNAL MEDICINE

## 2022-05-18 RX ORDER — TRAMADOL HYDROCHLORIDE 50 MG/1
50 TABLET ORAL EVERY 6 HOURS PRN
Qty: 90 TABLET | Refills: 0 | Status: SHIPPED | OUTPATIENT
Start: 2022-05-18 | End: 2022-06-22 | Stop reason: SDUPTHER

## 2022-05-18 RX ORDER — DICLOFENAC SODIUM 75 MG/1
75 TABLET, DELAYED RELEASE ORAL DAILY PRN
Qty: 30 TABLET | Refills: 1 | Status: SHIPPED | OUTPATIENT
Start: 2022-05-18 | End: 2022-07-26 | Stop reason: SDUPTHER

## 2022-05-18 RX ORDER — GABAPENTIN 600 MG/1
TABLET ORAL
Qty: 60 TABLET | Refills: 1 | Status: SHIPPED | OUTPATIENT
Start: 2022-05-18 | End: 2022-07-26 | Stop reason: SDUPTHER

## 2022-05-18 NOTE — PROGRESS NOTES
Ana Cristinacarroll Schrader  1965  [id number]      HISTORY OF PRESENT ILLNESS:  Ms. Trey Lozoya is a 61 y.o. female returns for a follow up visit for pain management  She has a diagnosis of   1. Chronic pain syndrome    2. Lumbar radiculopathy    3. 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 Primary localized osteoarthrosis of right lower leg    . She complains of pain in the hands Right, mid back, lower back and knees Bilateral She rates the pain 7/10 and describes it as sharp, aching, numbness. Current treatment regimen has helped relieve about 70% of the pain. She denies side effects from the current pain regimen. Patient reports that since the last follow up visit the physical functioning is unchanged, family/social relationships are unchanged, mood is unchanged and sleep patterns are unchanged, and that the overall functioning is unchanged. Patient denies neurological bowel or bladder. Patient denies misusing/abusing her narcotic pain medications or using any illegal drugs. There are No indicators for possible drug abuse, addiction or diversion problems. Upon obtaining the medical history from Ms. Trey Lozoya regarding today's office visit for her presenting problems, patient states she has been doing fair. Ms. Trey Lozoya states she is icing her legs, she states her knees swells up. Patient states she is using Ultram along with Voltaren. Patient denies any side effects with the medications. She mentions she is using Neurontin 600 mg BID. Patient reports she is working full time still. ALLERGIES: Patients list of allergies were reviewed     MEDICATIONS: Ms. Trey Lozoya list of medications were reviewed. Her current medications are   Outpatient Medications Prior to Visit   Medication Sig Dispense Refill    albuterol sulfate HFA (VENTOLIN HFA) 108 (90 Base) MCG/ACT inhaler Inhale 2 puffs into the lungs every 6 hours as needed for Wheezing 1 each 1    hydrOXYzine (ATARAX) 25 MG tablet Take 1 tablet by mouth every 8 hours as needed for Anxiety 90 tablet 3    Fluticasone Propionate (FLONASE NA) by Nasal route       melatonin 3 MG TABS tablet Take 3 mg by mouth nightly      diclofenac (VOLTAREN) 75 MG EC tablet Take 1 tablet by mouth daily as needed for Pain 30 tablet 1    gabapentin (NEURONTIN) 600 MG tablet Take 1 tablet po in Am, take 1 tablets po Pm 60 tablet 1     No facility-administered medications prior to visit. REVIEW OF SYSTEMS:    Respiratory: Negative for apnea, chest tightness and shortness of breath or change in baseline breathing. PHYSICAL EXAM:   Nursing note and vitals reviewed. BP (!) 151/82   Pulse 68   Ht 5' 8\" (1.727 m)   Wt 202 lb (91.6 kg)   SpO2 100%   BMI 30.71 kg/m²   Constitutional: She appears well-developed and well-nourished. No acute distress. Cardiovascular: Normal rate, regular rhythm, normal heart sounds, and does not have murmur. Pulmonary/Chest: Effort normal. No respiratory distress. She does not have wheezes in the lung fields. She has no rales. Neurological/Psychiatric:She is alert and oriented to person, place, and time. Coordination is  normal.  Her mood isAppropriate and affect is Neutral/Euthymic(normal) . Her    IMPRESSION:   1. Chronic pain syndrome    2. Lumbar radiculopathy    3. 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 Primary localized osteoarthrosis of right lower leg        PLAN:  Informed verbal consent was obtained  -[place] record was obtained and reviewed  for the last one year and no indicators of drug misuse  were found. Any other controlled substance prescriptions  seen on the record have been accounted for, I am aware of the patient receiving these medications. Constanza Vásquez [place] record will be rechecked as part of office protocol.     -ROM/Stretching exercises as advised   -She was advised to increase fluids ( 5-7  glasses of fluid daily), limit caffeine, avoid cheese products, increase dietary fiber, increase activity and exercise as tolerated and relax regularly and enjoy meals   -Continue with Ultram 2-3 per day  -Walking as tolerated       Current Outpatient Medications   Medication Sig Dispense Refill    diclofenac (VOLTAREN) 75 MG EC tablet Take 1 tablet by mouth daily as needed for Pain 30 tablet 1    gabapentin (NEURONTIN) 600 MG tablet Take 1 tablet po in Am, take 1 tablets po Pm 60 tablet 1    traMADol (ULTRAM) 50 MG tablet Take 1 tablet by mouth every 6 hours as needed for Pain (max 2-3 per day) for up to 35 days. 90 tablet 0    albuterol sulfate HFA (VENTOLIN HFA) 108 (90 Base) MCG/ACT inhaler Inhale 2 puffs into the lungs every 6 hours as needed for Wheezing 1 each 1    hydrOXYzine (ATARAX) 25 MG tablet Take 1 tablet by mouth every 8 hours as needed for Anxiety 90 tablet 3    Fluticasone Propionate (FLONASE NA) by Nasal route       melatonin 3 MG TABS tablet Take 3 mg by mouth nightly       No current facility-administered medications for this visit. I will continue her current medication regimen  which is part of the above treatment schedule. It has been helping with Ms. 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 Risks and benefits of the medications and other alternative treatments  including no treatment were discussed with the patient. The common side effects of these medications were also explained to the patient. Informed verbal consent was obtained. Goals of current treatment regimen include improvement in pain, restoration of functioning- with focus on improvement in physical performance, general activity, work or disability,emotional distress, health care utilization and  decreased medication consumption.  Will continue to monitor progress towards achieving/maintaining therapeutic goals with special emphasis on  1. Improvement in perceived interfernce  of pain with ADL's. Ability to do home exercises independently. Ability to do household chores indoor and/or outdoor work and social and leisure activities. Improve psychosocial and physical functioning. - she is showing progression towards this treatment goal with the current regimen. 2.     She was advised against drinking alcohol with the narcotic pain medicines, advised against driving or handling machinery while adjusting the dose of medicines or if having cognitive  issues related to the current medications. Risk of overdose and death, if medicines not taken as prescribed, were also discussed. If the patient develops new symptoms or if the symptoms worsen, the patient should call the office. While transcribing every attempt was made to maintain the accuracy of the note in terms of it's contents,there may have been some errors made inadvertently. Thank you for allowing me to participate in the care of this patient.     Debbi Aquino MD.    Cc: Claudetta Lenz, MAGNOLIA - CNP

## 2022-06-22 ENCOUNTER — OFFICE VISIT (OUTPATIENT)
Dept: PAIN MANAGEMENT | Age: 57
End: 2022-06-22
Payer: COMMERCIAL

## 2022-06-22 VITALS
WEIGHT: 199.6 LBS | SYSTOLIC BLOOD PRESSURE: 148 MMHG | HEART RATE: 59 BPM | BODY MASS INDEX: 30.35 KG/M2 | OXYGEN SATURATION: 94 % | DIASTOLIC BLOOD PRESSURE: 91 MMHG

## 2022-06-22 DIAGNOSIS — T84.84XS PAIN IN PROSTHETIC JOINT, SEQUELA: ICD-10-CM

## 2022-06-22 DIAGNOSIS — M54.16 LUMBAR RADICULOPATHY: ICD-10-CM

## 2022-06-22 DIAGNOSIS — M17.11 PRIMARY LOCALIZED OSTEOARTHROSIS OF RIGHT LOWER LEG: ICD-10-CM

## 2022-06-22 DIAGNOSIS — M79.7 FIBROMYALGIA: ICD-10-CM

## 2022-06-22 DIAGNOSIS — G89.4 CHRONIC PAIN SYNDROME: ICD-10-CM

## 2022-06-22 PROCEDURE — 99213 OFFICE O/P EST LOW 20 MIN: CPT | Performed by: INTERNAL MEDICINE

## 2022-06-22 RX ORDER — LANOLIN ALCOHOL/MO/W.PET/CERES
400 CREAM (GRAM) TOPICAL NIGHTLY
Qty: 30 TABLET | Refills: 0 | Status: SHIPPED | OUTPATIENT
Start: 2022-06-22 | End: 2022-08-30 | Stop reason: SDUPTHER

## 2022-06-22 RX ORDER — TRAMADOL HYDROCHLORIDE 50 MG/1
50 TABLET ORAL EVERY 6 HOURS PRN
Qty: 90 TABLET | Refills: 0 | Status: SHIPPED | OUTPATIENT
Start: 2022-06-22 | End: 2022-07-26 | Stop reason: SDUPTHER

## 2022-06-27 NOTE — PROGRESS NOTES
[facility]  1965  [id number]      HISTORY OF PRESENT ILLNESS:  Ms. London Quigley is a 61 y.o. female returns for a follow up visit for pain management  She has a diagnosis of   1. Chronic pain syndrome    2. Fibromyalgia    3. Primary localized osteoarthrosis of right lower leg    4. Mood disorder ([facility] [address]    5. 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 Primary insomnia    . She complains of pain in the Low back, bilateral knees  She rates the pain 7/10 and describes it as aching, burning. Current treatment regimen has helped relieve about 70% of the pain. She denies any side effects from the current pain regimen. Patient reports that since the last follow up visit the physical functioning is unchanged, family/social relationships are unchanged, mood is unchanged sleep patterns are unchanged, and that the overall functioning is unchanged. Patient denies misusing/abusing her narcotic pain medications or using any illegal drugs. There are No indicators for possible drug abuse, addiction or diversion problems. Patient states she has been doing fair,complains she is getting cramping in the legs. She says she is using Neurontin along with Ultram 2-3 per day. She denies any side effects. She mentions she is using Voltaren 75 mg as needed. She denies any GERD symptoms. ALLERGIES: Patients list of allergies were reviewed     MEDICATIONS: Ms. London Quigley list of medications were reviewed. Her current medications are   Outpatient Medications Prior to Visit   Medication Sig Dispense Refill    diclofenac (VOLTAREN) 75 MG EC tablet Take 1 tablet by mouth daily as needed for Pain 30 tablet 1    gabapentin (NEURONTIN) 600 MG tablet Take 1 tablet po in Am, take 1 tablets po Pm 60 tablet 1    albuterol sulfate HFA (VENTOLIN HFA) 108 (90 Base) MCG/ACT inhaler Inhale 2 puffs into the lungs every 6 hours as needed for Wheezing 1 each 1    Fluticasone Propionate (FLONASE NA) by Nasal route       melatonin 3 MG TABS tablet Take 3 mg by mouth nightly      traMADol (ULTRAM) 50 MG tablet Take 1 tablet by mouth every 6 hours as needed for Pain (max 2-3 per day) for up to 35 days. 90 tablet 0     No facility-administered medications prior to visit. REVIEW OF SYSTEMS:    Respiratory: Negative for apnea, chest tightness and shortness of breath or change in baseline breathing. PHYSICAL EXAM:   Nursing note and vitals reviewed. BP (!) 148/91   Pulse 59   Wt 199 lb 9.6 oz (90.5 kg)   SpO2 94%   BMI 30.35 kg/m²   Constitutional: She appears well-developed and well-nourished. No acute distress. Cardiovascular: Normal rate, regular rhythm, normal heart sounds, and does not have murmur. Pulmonary/Chest: Effort normal. No respiratory distress. She does not have wheezes in the lung fields. She has no rales. Neurological/Psychiatric:She is alert and oriented to person, place, and time. Coordination is  normal.  Her mood isAppropriate and affect is Neutral/Euthymic(normal) . IMPRESSION:   1. Chronic pain syndrome    2. Fibromyalgia    3. Primary localized osteoarthrosis of right lower leg    4. Lumbar radiculopathy    5. 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 Any other controlled substance prescriptions  seen on the record have been accounted for, I am aware of the patient receiving these medications. Maddy Pfeiffer [place] record will be rechecked as part of office protocol. -ROM/Stretching exercises as advised   -Continue with Ultram 2-3 per day   -walking/swimming exercises as advised   -Start  mg daily    Current Outpatient Medications   Medication Sig Dispense Refill    traMADol (ULTRAM) 50 MG tablet Take 1 tablet by mouth every 6 hours as needed for Pain (max 2-3 per day) for up to 35 days.  90 tablet 0    magnesium oxide (MGO) 400 (240 Mg) MG tablet Take 1 tablet by mouth nightly 30 tablet 0    diclofenac (VOLTAREN) 75 MG EC tablet Take 1 tablet by mouth daily as needed for Pain 30 tablet 1    gabapentin (NEURONTIN) 600 MG tablet Take 1 tablet po in Am, take 1 tablets po Pm 60 tablet 1    albuterol sulfate HFA (VENTOLIN HFA) 108 (90 Base) MCG/ACT inhaler Inhale 2 puffs into the lungs every 6 hours as needed for Wheezing 1 each 1    Fluticasone Propionate (FLONASE NA) by Nasal route       melatonin 3 MG TABS tablet Take 3 mg by mouth nightly       No current facility-administered medications for this visit. I will continue her current medication regimen  which is part of the above treatment schedule. It has been helping with Ms. 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 Risks and benefits of the medications and other alternative treatments  including no treatment were discussed with the patient. The common side effects of these medications were also explained to the patient. Informed verbal consent was obtained. Goals of current treatment regimen include improvement in pain, restoration of functioning- with focus on improvement in physical performance, general activity, work or disability,emotional distress, health care utilization and  decreased medication consumption. Will continue to monitor progress towards achieving/maintaining therapeutic goals with special emphasis on  1. Improvement in perceived interfernce  of pain with ADL's. Ability to do home exercises independently. Ability to do household chores indoor and/or outdoor work and social and leisure activities. Improve psychosocial and physical functioning. - she is showing progression towards this treatment goal with the current regimen. She was advised against drinking alcohol with the narcotic pain medicines, advised against driving or handling machinery while adjusting the dose of medicines or if having cognitive  issues related to the current medications. Risk of overdose and death, if medicines not taken as prescribed, were also discussed. If the patient develops new symptoms or if the symptoms worsen, the patient should call the office. While transcribing every attempt was made to maintain the accuracy of the note in terms of it's contents,there may have been some errors made inadvertently. Thank you for allowing me to participate in the care of this patient.     Mohsen Morocho MD.    Cc: MAGNOLIA Gutierrez - CNP

## 2022-07-26 ENCOUNTER — OFFICE VISIT (OUTPATIENT)
Dept: PAIN MANAGEMENT | Age: 57
End: 2022-07-26
Payer: COMMERCIAL

## 2022-07-26 VITALS
DIASTOLIC BLOOD PRESSURE: 84 MMHG | OXYGEN SATURATION: 97 % | SYSTOLIC BLOOD PRESSURE: 132 MMHG | HEIGHT: 68 IN | WEIGHT: 200 LBS | HEART RATE: 60 BPM | BODY MASS INDEX: 30.31 KG/M2

## 2022-07-26 DIAGNOSIS — T84.84XS PAIN IN PROSTHETIC JOINT, SEQUELA: ICD-10-CM

## 2022-07-26 DIAGNOSIS — M54.16 LUMBAR RADICULOPATHY: ICD-10-CM

## 2022-07-26 DIAGNOSIS — M17.11 PRIMARY LOCALIZED OSTEOARTHROSIS OF RIGHT LOWER LEG: ICD-10-CM

## 2022-07-26 DIAGNOSIS — G89.4 CHRONIC PAIN SYNDROME: ICD-10-CM

## 2022-07-26 DIAGNOSIS — M79.7 FIBROMYALGIA: ICD-10-CM

## 2022-07-26 PROCEDURE — 99213 OFFICE O/P EST LOW 20 MIN: CPT | Performed by: INTERNAL MEDICINE

## 2022-07-26 RX ORDER — DICLOFENAC SODIUM 75 MG/1
75 TABLET, DELAYED RELEASE ORAL DAILY PRN
Qty: 30 TABLET | Refills: 1 | Status: SHIPPED | OUTPATIENT
Start: 2022-07-26 | End: 2022-08-30 | Stop reason: SDUPTHER

## 2022-07-26 RX ORDER — GABAPENTIN 600 MG/1
TABLET ORAL
Qty: 60 TABLET | Refills: 1 | Status: SHIPPED | OUTPATIENT
Start: 2022-07-26 | End: 2022-08-30 | Stop reason: SDUPTHER

## 2022-07-26 RX ORDER — TRAMADOL HYDROCHLORIDE 50 MG/1
50 TABLET ORAL EVERY 6 HOURS PRN
Qty: 90 TABLET | Refills: 0 | Status: SHIPPED | OUTPATIENT
Start: 2022-07-26 | End: 2022-08-30 | Stop reason: SDUPTHER

## 2022-07-26 NOTE — PROGRESS NOTES
Ju Signs  1965  [id number]      HISTORY OF PRESENT ILLNESS:  Ms. Vick Hutton is a 61 y.o. female returns for a follow up visit for pain management  She has a diagnosis of   1. Chronic pain syndrome    2. Lumbar radiculopathy    3. Fibromyalgia    4. Primary localized osteoarthrosis of right lower leg    5. 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 Primary insomnia    7. Mood disorder ([address]    . She complains of pain in the  Low back, bilateral hands   She rates the pain 8/10 and describes it as sharp, aching. Current treatment regimen has helped relieve about 80% of the pain. She denies any side effects from the current pain regimen. Patient reports that since the last follow up visit the physical functioning is unchanged, family/social relationships are unchanged, mood is unchanged sleep patterns are unchanged, and that the overall functioning is unchanged. Patient denies misusing/abusing her narcotic pain medications or using any illegal drugs. There are No indicators for possible drug abuse, addiction or diversion problems. Patient reports she has been doing fair, back has been hurting more. She mentions she is working full time. She says she is using Ultram along with Neurontin and Voltaren. She states her weight has been stable. ALLERGIES: Patients list of allergies were reviewed     MEDICATIONS: Ms. Vick Hutton list of medications were reviewed. Her current medications are   Outpatient Medications Prior to Visit   Medication Sig Dispense Refill    magnesium oxide (MGO) 400 (240 Mg) MG tablet Take 1 tablet by mouth nightly 30 tablet 0    albuterol sulfate HFA (VENTOLIN HFA) 108 (90 Base) MCG/ACT inhaler Inhale 2 puffs into the lungs every 6 hours as needed for Wheezing 1 each 1    Fluticasone Propionate (FLONASE NA) by Nasal route       melatonin 3 MG TABS tablet Take 3 mg by mouth nightly      traMADol (ULTRAM) 50 MG tablet Take 1 tablet by mouth every 6 hours as needed for Pain (max 2-3 per day) for up to 35 days. 90 tablet 0    diclofenac (VOLTAREN) 75 MG EC tablet Take 1 tablet by mouth daily as needed for Pain 30 tablet 1    gabapentin (NEURONTIN) 600 MG tablet Take 1 tablet po in Am, take 1 tablets po Pm 60 tablet 1     No facility-administered medications prior to visit. REVIEW OF SYSTEMS:    Respiratory: Negative for apnea, chest tightness and shortness of breath or change in baseline breathing. PHYSICAL EXAM:   Nursing note and vitals reviewed. /84   Pulse 60   Ht 5' 8\" (1.727 m)   Wt 200 lb (90.7 kg)   SpO2 97%   BMI 30.41 kg/m²   Constitutional: She appears well-developed and well-nourished. No acute distress. Cardiovascular: Normal rate, regular rhythm, normal heart sounds, and does not have murmur. Pulmonary/Chest: Effort normal. No respiratory distress. She does not have wheezes in the lung fields. She has no rales. Neurological/Psychiatric:She is alert and oriented to person, place, and time. Coordination is  normal.  Her mood isAppropriate and affect is Neutral/Euthymic(normal) . IMPRESSION:   1. Chronic pain syndrome    2. Lumbar radiculopathy    3. Fibromyalgia    4. Primary localized osteoarthrosis of right lower leg    5. 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 Any other controlled substance prescriptions  seen on the record have been accounted for, I am aware of the patient receiving these medications. Kimberly Dorsey [place] record will be rechecked as part of office protocol.     -ROM/Stretching exercises as advised    -She was advised to increase fluids ( 5-7  glasses of fluid daily), limit caffeine, avoid cheese products, increase dietary fiber, increase activity and exercise as tolerated and relax regularly and enjoy meals   -Continue with Ultra 2-3 per day   She was advised weight reduction, diet changes- 800-1200 matthew diet, diet diary, exercising, nutritional  consult increased physical activity as tolerated   Current Outpatient Medications   Medication Sig Dispense Refill    traMADol (ULTRAM) 50 MG tablet Take 1 tablet by mouth every 6 hours as needed for Pain (max 2-3 per day) for up to 35 days. 90 tablet 0    diclofenac (VOLTAREN) 75 MG EC tablet Take 1 tablet by mouth daily as needed for Pain 30 tablet 1    gabapentin (NEURONTIN) 600 MG tablet Take 1 tablet po in Am, take 1 tablets po Pm 60 tablet 1    magnesium oxide (MGO) 400 (240 Mg) MG tablet Take 1 tablet by mouth nightly 30 tablet 0    albuterol sulfate HFA (VENTOLIN HFA) 108 (90 Base) MCG/ACT inhaler Inhale 2 puffs into the lungs every 6 hours as needed for Wheezing 1 each 1    Fluticasone Propionate (FLONASE NA) by Nasal route       melatonin 3 MG TABS tablet Take 3 mg by mouth nightly       No current facility-administered medications for this visit. I will continue her current medication regimen  which is part of the above treatment schedule. It has been helping with Ms. 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 Risks and benefits of the medications and other alternative treatments  including no treatment were discussed with the patient. The common side effects of these medications were also explained to the patient. Informed verbal consent was obtained. Goals of current treatment regimen include improvement in pain, restoration of functioning- with focus on improvement in physical performance, general activity, work or disability,emotional distress, health care utilization and  decreased medication consumption.  Will continue to monitor progress towards achieving/maintaining therapeutic goals with special emphasis on  1. Improvement in perceived interfernce  of pain with ADL's. Ability to do home exercises independently. Ability to do household chores indoor and/or outdoor work and social and leisure activities. Improve psychosocial and physical functioning. - she is showing progression towards this treatment goal with the current regimen. She was advised against drinking alcohol with the narcotic pain medicines, advised against driving or handling machinery while adjusting the dose of medicines or if having cognitive  issues related to the current medications. Risk of overdose and death, if medicines not taken as prescribed, were also discussed. If the patient develops new symptoms or if the symptoms worsen, the patient should call the office. While transcribing every attempt was made to maintain the accuracy of the note in terms of it's contents,there may have been some errors made inadvertently. Thank you for allowing me to participate in the care of this patient.     Jose Miguel Fisher MD.    Cc: MAGNOLIA Lyman - CNP

## 2022-08-30 ENCOUNTER — OFFICE VISIT (OUTPATIENT)
Dept: PAIN MANAGEMENT | Age: 57
End: 2022-08-30
Payer: COMMERCIAL

## 2022-08-30 VITALS
OXYGEN SATURATION: 99 % | HEART RATE: 58 BPM | WEIGHT: 201.2 LBS | SYSTOLIC BLOOD PRESSURE: 153 MMHG | BODY MASS INDEX: 30.59 KG/M2 | DIASTOLIC BLOOD PRESSURE: 79 MMHG

## 2022-08-30 DIAGNOSIS — G89.4 CHRONIC PAIN SYNDROME: ICD-10-CM

## 2022-08-30 DIAGNOSIS — M79.7 FIBROMYALGIA: ICD-10-CM

## 2022-08-30 DIAGNOSIS — M54.16 LUMBAR RADICULOPATHY: ICD-10-CM

## 2022-08-30 DIAGNOSIS — T84.84XS PAIN IN PROSTHETIC JOINT, SEQUELA: ICD-10-CM

## 2022-08-30 DIAGNOSIS — M17.11 PRIMARY LOCALIZED OSTEOARTHROSIS OF RIGHT LOWER LEG: ICD-10-CM

## 2022-08-30 PROCEDURE — 99213 OFFICE O/P EST LOW 20 MIN: CPT | Performed by: INTERNAL MEDICINE

## 2022-08-30 RX ORDER — LANOLIN ALCOHOL/MO/W.PET/CERES
400 CREAM (GRAM) TOPICAL NIGHTLY
Qty: 30 TABLET | Refills: 1 | Status: SHIPPED | OUTPATIENT
Start: 2022-08-30

## 2022-08-30 RX ORDER — DICLOFENAC SODIUM 75 MG/1
75 TABLET, DELAYED RELEASE ORAL DAILY PRN
Qty: 30 TABLET | Refills: 1 | Status: SHIPPED | OUTPATIENT
Start: 2022-08-30 | End: 2022-11-02 | Stop reason: SDUPTHER

## 2022-08-30 RX ORDER — GABAPENTIN 600 MG/1
TABLET ORAL
Qty: 60 TABLET | Refills: 1 | Status: SHIPPED | OUTPATIENT
Start: 2022-08-30 | End: 2022-10-04 | Stop reason: SDUPTHER

## 2022-08-30 RX ORDER — TRAMADOL HYDROCHLORIDE 50 MG/1
50 TABLET ORAL EVERY 6 HOURS PRN
Qty: 90 TABLET | Refills: 0 | Status: SHIPPED | OUTPATIENT
Start: 2022-08-30 | End: 2022-10-04 | Stop reason: SDUPTHER

## 2022-08-30 NOTE — PROGRESS NOTES
hours as needed for Pain (max 2-3 per day) for up to 35 days. 90 tablet 0    diclofenac (VOLTAREN) 75 MG EC tablet Take 1 tablet by mouth daily as needed for Pain 30 tablet 1    gabapentin (NEURONTIN) 600 MG tablet Take 1 tablet po in Am, take 1 tablets po Pm 60 tablet 1    magnesium oxide (MGO) 400 (240 Mg) MG tablet Take 1 tablet by mouth nightly 30 tablet 0     No facility-administered medications prior to visit. REVIEW OF SYSTEMS:    Respiratory: Negative for apnea, chest tightness and shortness of breath or change in baseline breathing. PHYSICAL EXAM:   Nursing note and vitals reviewed. BP (!) 153/79   Pulse 58   Wt 201 lb 3.2 oz (91.3 kg)   SpO2 99%   BMI 30.59 kg/m²   Constitutional: She appears well-developed and well-nourished. No acute distress. Cardiovascular: Normal rate, regular rhythm, normal heart sounds, and does not have murmur. Pulmonary/Chest: Effort normal. No respiratory distress. She does not have wheezes in the lung fields. She has no rales. Neurological/Psychiatric:She is alert and oriented to person, place, and time. Coordination is  normal.  Her mood isAppropriate and affect is Neutral/Euthymic(normal) . Her    IMPRESSION:   1. Chronic pain syndrome    2. 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 Primary localized osteoarthrosis of right lower leg    5. Fibromyalgia        PLAN:  Informed verbal consent was obtained  -[place] record was obtained and reviewed  for the last one year and no indicators of drug misuse  were found. Any other controlled substance prescriptions  seen on the record have been accounted for, I am aware of the patient receiving these medications. Ginogypsy Luong OAR[place] record will be rechecked as part of office protocol.     -ROM/stretching exercises as advised   -She was advised to increase fluids ( 5-7  glasses of fluid daily), limit caffeine, avoid cheese products, increase dietary fiber, increase activity and exercise as tolerated and relax regularly and enjoy meals   -Continue with Ultram 2-3 per day   -Continue with all other adjuvants    Current Outpatient Medications   Medication Sig Dispense Refill    traMADol (ULTRAM) 50 MG tablet Take 1 tablet by mouth every 6 hours as needed for Pain (max 2-3 per day) for up to 35 days. 90 tablet 0    diclofenac (VOLTAREN) 75 MG EC tablet Take 1 tablet by mouth daily as needed for Pain 30 tablet 1    gabapentin (NEURONTIN) 600 MG tablet Take 1 tablet po in Am, take 1 tablets po Pm 60 tablet 1    magnesium oxide (MGO) 400 (240 Mg) MG tablet Take 1 tablet by mouth nightly 30 tablet 1    albuterol sulfate HFA (VENTOLIN HFA) 108 (90 Base) MCG/ACT inhaler Inhale 2 puffs into the lungs every 6 hours as needed for Wheezing 1 each 1    Fluticasone Propionate (FLONASE NA) by Nasal route       melatonin 3 MG TABS tablet Take 3 mg by mouth nightly       No current facility-administered medications for this visit. I will continue her current medication regimen  which is part of the above treatment schedule. It has been helping with Ms. 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 Risks and benefits of the medications and other alternative treatments  including no treatment were discussed with the patient. The common side effects of these medications were also explained to the patient. Informed verbal consent was obtained. Goals of current treatment regimen include improvement in pain, restoration of functioning- with focus on improvement in physical performance, general activity, work or disability,emotional distress, health care utilization and  decreased medication consumption.  Will continue to monitor progress towards achieving/maintaining therapeutic goals with special

## 2022-10-04 ENCOUNTER — OFFICE VISIT (OUTPATIENT)
Dept: PAIN MANAGEMENT | Age: 57
End: 2022-10-04
Payer: COMMERCIAL

## 2022-10-04 VITALS
SYSTOLIC BLOOD PRESSURE: 138 MMHG | BODY MASS INDEX: 30.11 KG/M2 | HEART RATE: 74 BPM | DIASTOLIC BLOOD PRESSURE: 80 MMHG | WEIGHT: 198 LBS

## 2022-10-04 DIAGNOSIS — F39 MOOD DISORDER (HCC): ICD-10-CM

## 2022-10-04 DIAGNOSIS — G89.4 CHRONIC PAIN SYNDROME: ICD-10-CM

## 2022-10-04 DIAGNOSIS — F51.01 PRIMARY INSOMNIA: ICD-10-CM

## 2022-10-04 DIAGNOSIS — T84.84XS PAIN IN PROSTHETIC JOINT, SEQUELA: ICD-10-CM

## 2022-10-04 DIAGNOSIS — M54.16 LUMBAR RADICULOPATHY: ICD-10-CM

## 2022-10-04 DIAGNOSIS — M79.7 FIBROMYALGIA: ICD-10-CM

## 2022-10-04 DIAGNOSIS — M17.11 PRIMARY LOCALIZED OSTEOARTHROSIS OF RIGHT LOWER LEG: ICD-10-CM

## 2022-10-04 PROCEDURE — 99214 OFFICE O/P EST MOD 30 MIN: CPT | Performed by: INTERNAL MEDICINE

## 2022-10-04 RX ORDER — TRAMADOL HYDROCHLORIDE 50 MG/1
50 TABLET ORAL EVERY 6 HOURS PRN
Qty: 90 TABLET | Refills: 0 | Status: SHIPPED | OUTPATIENT
Start: 2022-10-04 | End: 2022-11-02 | Stop reason: SDUPTHER

## 2022-10-04 RX ORDER — GABAPENTIN 600 MG/1
600 TABLET ORAL 3 TIMES DAILY
Qty: 90 TABLET | Refills: 1 | Status: SHIPPED | OUTPATIENT
Start: 2022-10-04 | End: 2022-11-02 | Stop reason: SDUPTHER

## 2022-10-04 NOTE — PROGRESS NOTES
Ernesto Joni  1965  [id number]    HISTORY OF PRESENT ILLNESS:  Ms. Melissa Neville is a 61 y.o. female returns for a follow up visit for multiple medical problems. Her  presenting problems are   1. Chronic pain syndrome    2. 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 Primary localized osteoarthrosis of right lower leg    5. Fibromyalgia    6. Primary insomnia    7. Mood disorder ([facility] [address]    . As per information/history obtained from the PADT(patient assessment and documentation tool) -  She complains of pain in the neck and lower back with radiation to the shoulders Bilateral, arms Bilateral, elbows Bilateral, and hands Bilateral She rates the pain 9/10 and describes it as sharp, aching. Pain is made worse by: movement, walking, standing, sitting, bending, lifting. Current treatment regimen has helped relieve about 80% of the pain. She denies side effects from the current pain regimen. Patient reports that since the last follow up visit the physical functioning is unchanged, family/social relationships are unchanged, mood is unchanged and sleep patterns are unchanged, and that the overall functioning is unchanged. Patient denies neurological bowel or bladder. Patient denies misusing/abusing her narcotic pain medications or using any illegal drugs. There are No indicators for possible drug abuse, addiction or diversion problems. Upon obtaining the medical history from Ms. Melissa Neville regarding today's office visit for her presenting problems, patient states she has been doing fair, back has been hurting a lot. She says the pain is going into the right leg and is unbearable at time. She says she is using Voltaren along with Neurontin and Ultram 2-3 per day. She reports she is working 40 hrs per week. She says she is doing a lot of lifting more than 35 lbs. Patient states her sleep is fair. Has fairly normal sleep latency. Averages about 4-6 hours of sleep a night.  Denies any signs of sleep apnea. Feels somewhat rested in the morning. ALLERGIES/PAST MED/FAM/SOC HISTORY: Ms. Torres  allergies, past medical, family and social history were reviewed in the chart. Ms. Melissa Neville current medications are   Outpatient Medications Prior to Visit   Medication Sig Dispense Refill    diclofenac (VOLTAREN) 75 MG EC tablet Take 1 tablet by mouth daily as needed for Pain 30 tablet 1    magnesium oxide (MGO) 400 (240 Mg) MG tablet Take 1 tablet by mouth nightly 30 tablet 1    albuterol sulfate HFA (VENTOLIN HFA) 108 (90 Base) MCG/ACT inhaler Inhale 2 puffs into the lungs every 6 hours as needed for Wheezing 1 each 1    Fluticasone Propionate (FLONASE NA) by Nasal route       melatonin 3 MG TABS tablet Take 3 mg by mouth nightly      traMADol (ULTRAM) 50 MG tablet Take 1 tablet by mouth every 6 hours as needed for Pain (max 2-3 per day) for up to 35 days. 90 tablet 0    gabapentin (NEURONTIN) 600 MG tablet Take 1 tablet po in Am, take 1 tablets po Pm 60 tablet 1     No facility-administered medications prior to visit. REVIEW OF SYSTEMS: .   Respiratory: Negative for shortness of breath. Cardiovascular: Negative for chest pain, palpitations  Gastrointestinal: Negative for blood in stool, abdominal distention, nausea, vomiting, abdominal pain, diarrhea,constipation. Neurological: Negative for speech difficulty, weakness and light-headedness, dizziness, tremors, sleepiness  Psychiatric/Behavioral: Negative for suicidal ideas, hallucinations, behavioral problems, self-injury, decreased concentration/cognition, agitation, confusion. PHYSICAL EXAM:   Nursing note and vitals reviewed. /80   Pulse 74   Wt 198 lb (89.8 kg)   BMI 30.11 kg/m²   General Appearance: Patient is well nourished, well developed, well groomed and in no acute distress. Skin: Skin is warm and dry, good turgor . No rash or lesions noted. She is not diaphoretic.      Pulmonary/Chest: Effort normal. No respiratory distress or use of accessory muscles. Auscultation revealing normal air entry. She does not have wheezes in the lung fields. She has no rales. Cardiovascular: Normal rate, regular rhythm, normal heart sounds, and does not have murmur. Exam reveals no gallop and no friction rub. Musculoskeletal / Extremities: Range of motion is normal. Gait is normal, assistive devices use: none. She exhibits edema: none, and no tenderness. Neurological/Psychiatric:She is alert and oriented to person, place, and time. Coordination is  normal.   Judgement and Insight is normal  Her mood is Appropriate and affect is Neutral/Euthymic(normal) . Her behavior is normal.   thought content normal.        IMPRESSION:     1. Chronic pain syndrome    2. 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 Primary localized osteoarthrosis of right lower leg    5. Fibromyalgia    6. Primary insomnia    7. Mood disorder ([facility]        PLAN:  Informed verbal consent was obtained. -OARRS record was obtained and reviewed  for the last one year and no indicators of drug misuse  were found. Any other controlled substance prescriptions  seen on the record have been accounted for, I am aware of the patient receiving these medications. Almita Javed OARRS record will be rechecked as part of office protocol.     -ROM/Stretching exercises for back   -.constiaptione   -Start Medrol pack   -Continue with Voltaren 75 mg daily   -Continue with Neurontin 600 mg increase to 600 mg TID   -Continue with Ultram 50 mg 2-3 per day   -She was advised weight reduction, diet changes- 800-1200 matthew diet, diet diary, exercising, nutritional  consult increased physical activity as tolerated   Ms. 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 Medications/orders associated with this visit:    Current Outpatient Medications   Medication Sig Dispense Refill    traMADol (ULTRAM) 50 MG tablet Take 1 tablet by mouth every 6 hours as needed for Pain (max 2-3 per day) for up to 35 days. 90 tablet 0    gabapentin (NEURONTIN) 600 MG tablet Take 1 tablet by mouth 3 times daily for 30 days. Take 1 tablet po in Am, take 1 tablets po Pm 90 tablet 1    diclofenac (VOLTAREN) 75 MG EC tablet Take 1 tablet by mouth daily as needed for Pain 30 tablet 1    magnesium oxide (MGO) 400 (240 Mg) MG tablet Take 1 tablet by mouth nightly 30 tablet 1    albuterol sulfate HFA (VENTOLIN HFA) 108 (90 Base) MCG/ACT inhaler Inhale 2 puffs into the lungs every 6 hours as needed for Wheezing 1 each 1    Fluticasone Propionate (FLONASE NA) by Nasal route       melatonin 3 MG TABS tablet Take 3 mg by mouth nightly       No current facility-administered medications for this visit. Goals of current treatment regimen include improvement in pain, restoration of functioning- with focus on improvement in physical performance, general activity, work or disability,emotional distress, health care utilization and  decreased medication consumption. Will continue to monitor progress towards achieving/maintaining therapeutic goals with special emphasis on  1. Improvement in perceived interfernce  of pain with ADL's. Ability to do home exercises independently. Ability to do household chores indoor and/or outdoor work and social and leisure activities. To increase flexibility/ROM, strength and endurance. Improve psychosocial and physical functioning.- she is not showing any significant progress/or showing regression  towards this goal and reassessment and adjustment of goals/treatment have been made. 2. Improving sleep to 6-7 hours a night.  Improve mood/ anxiety and depression symptoms such as crying spells, low energy, problems with concentration, motivation. - she is showing progression towards this treatment goal with the current regimen. 3. Reduction of reliance on opioid analgesia/more appropriate opioid use. - she is showing progression towards this treatment goal with the current regimen. Risks and benefits of the medications and other alternative treatments have been/were  discussed with the patient. Any questions on the  common side effects of these medications were also answered. She was advised against drinking alcohol with the narcotic pain medicines, advised against driving or handling machinery when  starting or adjusting the dose of medicines, feeling groggy or drowsy, or if having any cognitive issues related to the current medications. Sheis fully aware of the risk of overdose and death, if medicines are misused and not taken as prescribed. If she develops new symptoms or if the symptoms worsen, she was told to call the office. .  Thank you for allowing me to participate in the care of this patient.     Anjel Morgan MD    Cc: Siobhan Jalloh, MAGNOLIA - CNP

## 2022-10-09 NOTE — PROGRESS NOTES
Kat Manzano (:  1965) is a 61 y.o. female,Established patient, here for evaluation of the following chief complaint(s):  Follow-up         ASSESSMENT/PLAN:  1. Mild intermittent asthma without complication  -     albuterol sulfate HFA (VENTOLIN HFA) 108 (90 Base) MCG/ACT inhaler; Inhale 2 puffs into the lungs every 6 hours as needed for Wheezing, Disp-1 each, R-1Normal  2. Hyperlipidemia, unspecified hyperlipidemia type; reports normal values with employer  -decrease fatty, fried, fast foods  3. Prediabetes  -     POCT glycosylated hemoglobin (Hb A1C): 5.8%  -decrease sugary foods, drinks, and starches  4. Encounter for screening mammogram for malignant neoplasm of breast  -     ADITI DIGITAL SCREEN W OR WO CAD BILATERAL; Future      Return in about 6 months (around 2023). Reports mother, very sick since contacting Covid in August, remains in SNF, unable to return home. Subjective   SUBJECTIVE/OBJECTIVE:  Hyperlipidemia  This is a chronic problem. The current episode started more than 1 year ago. Exacerbating diseases include obesity. Pertinent negatives include no chest pain, myalgias or shortness of breath. Current antihyperlipidemic treatment includes diet change and exercise. Compliance problems include adherence to diet and adherence to exercise. Risk factors for coronary artery disease include obesity, dyslipidemia and post-menopausal.   Reports she had labs drawn by employer in September, reports values were normal.     Ms. Lise Joya is being followed by Dr. Oz Ramos for lumbar radiculopathy, fibromyalgia, and chronic pain syndrome. She is being prescribed:   Tramadol, diclofenac and Gabapentin. Urinary Frequency   This is a new problem. The current episode started 1 to 4 weeks ago. The problem occurs every urination. The pain is mild. There has been no fever. Associated symptoms include frequency. Pertinent negatives include no discharge. She has tried nothing for the symptoms. 2/9/2022 treated at Urgent Care for cystitis, treated with Bactrim. Recently treated for UTI at Urgent care clinic     Asthma   She complains of difficulty breathing and shortness of breath (intermittent with asthama flare up). This is a chronic problem. Associated symptoms include myalgias (chronic right knee pain). Her symptoms are aggravated by exposure to smoke and change in weather. Her symptoms are alleviated by beta-agonist. She reports significant improvement on treatment. Her past medical history is significant for asthma. Right knee  Had total knee replacement. Stated been taking since 2015. She states she has chest discomfort. She reports taking medication twice daily for relief with Ibuprofen. Sttes she wears a leg brace, continued pain. She reports swelling of right knee and pain. She states she works in the [employer], standing for long periods of time. States pain is constant, has tried to decrease dose of Tramadol, but pain remains uncontrolled. S/P TKA 8/19/2015 by Nadja Fraga, followed by manipulation under anesthesia 10/2/2015. Evaluated by Dr. Ruth Montes 11/2018. Has been taking Tramadol, chronically prescribed by previous PCP. Last time seen by Orthopedics 8/22/2019 Dr. Papo Mason   Last x-ray results noted 11/26/2018. Infectious workup and aquatic therapy has been recommended in the past.     Prediabetes 6.1%--> 5.8%    Review of Systems   Constitutional:  Negative for activity change and fever. HENT:  Negative for congestion. Eyes:  Negative for visual disturbance. Respiratory:  Negative for chest tightness and shortness of breath. Cardiovascular:  Negative for chest pain, palpitations and leg swelling. Gastrointestinal:  Negative for abdominal pain, constipation and diarrhea. Endocrine: Negative for polyuria. Prediabetes   Genitourinary:  Positive for frequency. Negative for dysuria. Hysterectomy   Musculoskeletal:  Positive for back pain.  Negative for arthralgias and myalgias. Right knee arthroplasty   Skin:  Negative for rash. Neurological:  Positive for dizziness. Negative for light-headedness and headaches. Psychiatric/Behavioral:  Negative for agitation, decreased concentration and sleep disturbance. The patient is not nervous/anxious. Anxiety        Objective    height is 5' 8\" (1.727 m) and weight is 203 lb (92.1 kg). Her temperature is 97 °F (36.1 °C). Her blood pressure is 151/93 (abnormal) and her pulse is 52. Her oxygen saturation is 98%. BP Readings from Last 3 Encounters:   10/11/22 (!) 151/93   10/04/22 138/80   08/30/22 (!) 153/79      Wt Readings from Last 3 Encounters:   10/11/22 203 lb (92.1 kg)   10/04/22 198 lb (89.8 kg)   08/30/22 201 lb 3.2 oz (91.3 kg)      Past Medical History:   Diagnosis Date    Anxiety     Arthritis     Asthma     Osteoarthritis       Past Surgical History:   Procedure Laterality Date    HYSTERECTOMY, TOTAL ABDOMINAL (CERVIX REMOVED)  2014    fibroids    KNEE ARTHROPLASTY Right 8-19-15    KNEE JOINT MANIPULATION Right 10/02/2015      Social History     Tobacco Use    Smoking status: Never    Smokeless tobacco: Never   Vaping Use    Vaping Use: Never used   Substance Use Topics    Alcohol use: No     Alcohol/week: 0.0 standard drinks    Drug use: No      Family History   Problem Relation Age of Onset    Asthma Mother     Diabetes Mother     Heart Disease Father     Diabetes Maternal Grandmother       Outpatient Encounter Medications as of 10/11/2022   Medication Sig Dispense Refill    albuterol sulfate HFA (VENTOLIN HFA) 108 (90 Base) MCG/ACT inhaler Inhale 2 puffs into the lungs every 6 hours as needed for Wheezing 1 each 1    traMADol (ULTRAM) 50 MG tablet Take 1 tablet by mouth every 6 hours as needed for Pain (max 2-3 per day) for up to 35 days. 90 tablet 0    gabapentin (NEURONTIN) 600 MG tablet Take 1 tablet by mouth 3 times daily for 30 days.  Take 1 tablet po in Am, take 1 tablets po Pm 90 tablet 1    diclofenac (VOLTAREN) 75 MG EC tablet Take 1 tablet by mouth daily as needed for Pain 30 tablet 1    magnesium oxide (MGO) 400 (240 Mg) MG tablet Take 1 tablet by mouth nightly 30 tablet 1    Fluticasone Propionate (FLONASE NA) by Nasal route       melatonin 3 MG TABS tablet Take 3 mg by mouth nightly      [DISCONTINUED] albuterol sulfate HFA (VENTOLIN HFA) 108 (90 Base) MCG/ACT inhaler Inhale 2 puffs into the lungs every 6 hours as needed for Wheezing 1 each 1     No facility-administered encounter medications on file as of 10/11/2022. Physical Exam  Vitals reviewed. Constitutional:       Appearance: She is well-developed. HENT:      Head: Normocephalic. Right Ear: Hearing and external ear normal.      Left Ear: Hearing and external ear normal.      Nose: Nose normal.   Eyes:      General: Lids are normal.   Cardiovascular:      Rate and Rhythm: Normal rate and regular rhythm. Heart sounds: Normal heart sounds, S1 normal and S2 normal.   Pulmonary:      Effort: Pulmonary effort is normal.      Breath sounds: Normal breath sounds. Musculoskeletal:         General: Normal range of motion. Skin:     General: Skin is warm and dry. Findings: No rash. Neurological:      Mental Status: She is alert and oriented to person, place, and time. GCS: GCS eye subscore is 4. GCS verbal subscore is 5. GCS motor subscore is 6. Psychiatric:         Speech: Speech normal.         Behavior: Behavior normal. Behavior is cooperative. On this date 10/11/2022 I have spent 15 minutes reviewing previous notes, test results and face to face with the patient discussing the diagnosis and importance of compliance with the treatment plan as well as documenting on the day of the visit. An electronic signature was used to authenticate this note.     --MAGNOLIA Reeder - CNP

## 2022-10-11 ENCOUNTER — OFFICE VISIT (OUTPATIENT)
Dept: PRIMARY CARE CLINIC | Age: 57
End: 2022-10-11
Payer: COMMERCIAL

## 2022-10-11 VITALS
HEART RATE: 52 BPM | OXYGEN SATURATION: 98 % | BODY MASS INDEX: 30.77 KG/M2 | HEIGHT: 68 IN | WEIGHT: 203 LBS | SYSTOLIC BLOOD PRESSURE: 151 MMHG | DIASTOLIC BLOOD PRESSURE: 93 MMHG | TEMPERATURE: 97 F

## 2022-10-11 DIAGNOSIS — Z12.31 ENCOUNTER FOR SCREENING MAMMOGRAM FOR MALIGNANT NEOPLASM OF BREAST: ICD-10-CM

## 2022-10-11 DIAGNOSIS — R73.03 PREDIABETES: ICD-10-CM

## 2022-10-11 DIAGNOSIS — J45.20 MILD INTERMITTENT ASTHMA WITHOUT COMPLICATION: Primary | ICD-10-CM

## 2022-10-11 DIAGNOSIS — E78.5 HYPERLIPIDEMIA, UNSPECIFIED HYPERLIPIDEMIA TYPE: ICD-10-CM

## 2022-10-11 LAB — HBA1C MFR BLD: 5.8 %

## 2022-10-11 PROCEDURE — 99213 OFFICE O/P EST LOW 20 MIN: CPT | Performed by: NURSE PRACTITIONER

## 2022-10-11 PROCEDURE — 83036 HEMOGLOBIN GLYCOSYLATED A1C: CPT | Performed by: NURSE PRACTITIONER

## 2022-10-11 RX ORDER — ALBUTEROL SULFATE 90 UG/1
2 AEROSOL, METERED RESPIRATORY (INHALATION) EVERY 6 HOURS PRN
Qty: 1 EACH | Refills: 1 | Status: SHIPPED | OUTPATIENT
Start: 2022-10-11

## 2022-10-11 ASSESSMENT — ENCOUNTER SYMPTOMS
CONSTIPATION: 0
BACK PAIN: 1
SHORTNESS OF BREATH: 0
CHEST TIGHTNESS: 0
DIARRHEA: 0
ABDOMINAL PAIN: 0

## 2022-10-11 ASSESSMENT — PATIENT HEALTH QUESTIONNAIRE - PHQ9
SUM OF ALL RESPONSES TO PHQ QUESTIONS 1-9: 0
SUM OF ALL RESPONSES TO PHQ9 QUESTIONS 1 & 2: 0
1. LITTLE INTEREST OR PLEASURE IN DOING THINGS: 0
SUM OF ALL RESPONSES TO PHQ QUESTIONS 1-9: 0
2. FEELING DOWN, DEPRESSED OR HOPELESS: 0
SUM OF ALL RESPONSES TO PHQ QUESTIONS 1-9: 0
SUM OF ALL RESPONSES TO PHQ QUESTIONS 1-9: 0

## 2022-10-31 ENCOUNTER — TELEPHONE (OUTPATIENT)
Dept: PAIN MANAGEMENT | Age: 57
End: 2022-10-31

## 2022-10-31 NOTE — TELEPHONE ENCOUNTER
Patient called and stated that she will be out of her Tramadol before her appt on 11/08/2022. Patient is requesting a callback. Please advise.

## 2022-11-02 ENCOUNTER — OFFICE VISIT (OUTPATIENT)
Dept: PAIN MANAGEMENT | Age: 57
End: 2022-11-02
Payer: COMMERCIAL

## 2022-11-02 VITALS
HEART RATE: 86 BPM | OXYGEN SATURATION: 97 % | WEIGHT: 206 LBS | DIASTOLIC BLOOD PRESSURE: 88 MMHG | BODY MASS INDEX: 31.32 KG/M2 | SYSTOLIC BLOOD PRESSURE: 136 MMHG

## 2022-11-02 DIAGNOSIS — M54.16 LUMBAR RADICULOPATHY: ICD-10-CM

## 2022-11-02 DIAGNOSIS — G89.4 CHRONIC PAIN SYNDROME: ICD-10-CM

## 2022-11-02 DIAGNOSIS — M79.7 FIBROMYALGIA: ICD-10-CM

## 2022-11-02 DIAGNOSIS — T84.84XS PAIN IN PROSTHETIC JOINT, SEQUELA: ICD-10-CM

## 2022-11-02 DIAGNOSIS — M17.11 PRIMARY LOCALIZED OSTEOARTHROSIS OF RIGHT LOWER LEG: ICD-10-CM

## 2022-11-02 PROCEDURE — 99213 OFFICE O/P EST LOW 20 MIN: CPT | Performed by: INTERNAL MEDICINE

## 2022-11-02 RX ORDER — TRAMADOL HYDROCHLORIDE 50 MG/1
50 TABLET ORAL EVERY 6 HOURS PRN
Qty: 105 TABLET | Refills: 0 | Status: SHIPPED | OUTPATIENT
Start: 2022-11-02 | End: 2022-12-07

## 2022-11-02 RX ORDER — TRAMADOL HYDROCHLORIDE 50 MG/1
TABLET ORAL
Qty: 90 TABLET | OUTPATIENT
Start: 2022-11-02

## 2022-11-02 RX ORDER — GABAPENTIN 600 MG/1
600 TABLET ORAL 3 TIMES DAILY
Qty: 90 TABLET | Refills: 1 | Status: SHIPPED | OUTPATIENT
Start: 2022-11-02 | End: 2022-12-02

## 2022-11-02 RX ORDER — DICLOFENAC SODIUM 75 MG/1
75 TABLET, DELAYED RELEASE ORAL DAILY PRN
Qty: 30 TABLET | Refills: 1 | Status: SHIPPED | OUTPATIENT
Start: 2022-11-02

## 2022-11-02 NOTE — PROGRESS NOTES
Erin José Miguel  1965  [id number]      HISTORY OF PRESENT ILLNESS:  Ms. Scooby Mchugh is a 61 y.o. female returns for a follow up visit for pain management  She has a diagnosis of   1. Chronic pain syndrome    2. 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 Primary localized osteoarthrosis of right lower leg    5. Fibromyalgia    6. Primary insomnia    7. Mood disorder ([address]    . She complains of pain in the  Neck, lower back, bilateral shoulders, bilateral hands, bilateral knees   She rates the pain 7/10 and describes it as sharp, aching, burning. Current treatment regimen has helped relieve about 70% of the pain. She denies any side effects from the current pain regimen. Patient reports that since the last follow up visit the physical functioning is unchanged, family/social relationships are unchanged, mood is unchanged sleep patterns are unchanged, and that the overall functioning is unchanged. Patient denies misusing/abusing her narcotic pain medications or using any illegal drugs. There are No indicators for possible drug abuse, addiction or diversion problems. Patient complains she is not feeling too well, has a cough. She says she is working full time. She mentions she is using Ultram 2-3 per day. She denies any constipation symptoms. She reports she managing her ADLs. She says everything just hurting bad lately. ALLERGIES: Patients list of allergies were reviewed     MEDICATIONS: Ms. Scooby Mchugh list of medications were reviewed. Her current medications are   Outpatient Medications Prior to Visit   Medication Sig Dispense Refill    albuterol sulfate HFA (VENTOLIN HFA) 108 (90 Base) MCG/ACT inhaler Inhale 2 puffs into the lungs every 6 hours as needed for Wheezing 1 each 1    traMADol (ULTRAM) 50 MG tablet Take 1 tablet by mouth every 6 hours as needed for Pain (max 2-3 per day) for up to 35 days.  90 tablet 0    gabapentin (NEURONTIN) 600 MG tablet Take 1 tablet by mouth 3 times daily for 30 days. Take 1 tablet po in Am, take 1 tablets po Pm 90 tablet 1    diclofenac (VOLTAREN) 75 MG EC tablet Take 1 tablet by mouth daily as needed for Pain 30 tablet 1    magnesium oxide (MGO) 400 (240 Mg) MG tablet Take 1 tablet by mouth nightly 30 tablet 1    Fluticasone Propionate (FLONASE NA) by Nasal route       melatonin 3 MG TABS tablet Take 3 mg by mouth nightly       No facility-administered medications prior to visit. REVIEW OF SYSTEMS:    Respiratory: Negative for apnea, chest tightness and shortness of breath or change in baseline breathing. PHYSICAL EXAM:   Nursing note and vitals reviewed. /88   Pulse 86   Wt 206 lb (93.4 kg)   SpO2 97%   BMI 31.32 kg/m²   Constitutional: She appears well-developed and well-nourished. No acute distress. Cardiovascular: Normal rate, regular rhythm, normal heart sounds, and does not have murmur. Pulmonary/Chest: Effort normal. No respiratory distress. She does not have wheezes in the lung fields. She has no rales. Neurological/Psychiatric:She is alert and oriented to person, place, and time. Coordination is  normal.  Her mood isAppropriate and affect is Neutral/Euthymic(normal) . Her    IMPRESSION:   1. Chronic pain syndrome    2. 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 Primary localized osteoarthrosis of right lower leg    5.  Fibromyalgia        PLAN:  Informed verbal consent was obtained  -ROM/Stretching exercises as advised   -She was advised to increase fluids ( 5-7  glasses of fluid daily), limit caffeine, avoid cheese products, increase dietary fiber, increase activity and exercise as tolerated and relax regularly and enjoy meals   -Continue with Ultram 3 per day  -Walking as tolerated    -She was advised weight reduction, diet changes- 800-1200 matthew diet, diet diary, exercising, nutritional  consult increased physical activity as tolerated   -Urine drug screen with GC/MS for opiates and drugs of abuse was ordered and will follow up on results. Current Outpatient Medications   Medication Sig Dispense Refill    albuterol sulfate HFA (VENTOLIN HFA) 108 (90 Base) MCG/ACT inhaler Inhale 2 puffs into the lungs every 6 hours as needed for Wheezing 1 each 1    traMADol (ULTRAM) 50 MG tablet Take 1 tablet by mouth every 6 hours as needed for Pain (max 2-3 per day) for up to 35 days. 90 tablet 0    gabapentin (NEURONTIN) 600 MG tablet Take 1 tablet by mouth 3 times daily for 30 days. Take 1 tablet po in Am, take 1 tablets po Pm 90 tablet 1    diclofenac (VOLTAREN) 75 MG EC tablet Take 1 tablet by mouth daily as needed for Pain 30 tablet 1    magnesium oxide (MGO) 400 (240 Mg) MG tablet Take 1 tablet by mouth nightly 30 tablet 1    Fluticasone Propionate (FLONASE NA) by Nasal route       melatonin 3 MG TABS tablet Take 3 mg by mouth nightly       No current facility-administered medications for this visit. I will continue her current medication regimen  which is part of the above treatment schedule. It has been helping with Ms. 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 Risks and benefits of the medications and other alternative treatments  including no treatment were discussed with the patient. The common side effects of these medications were also explained to the patient. Informed verbal consent was obtained. Goals of current treatment regimen include improvement in pain, restoration of functioning- with focus on improvement in physical performance, general activity, work or disability,emotional distress, health care utilization and  decreased medication consumption.  Will continue to monitor progress towards achieving/maintaining therapeutic goals with special emphasis on  1. Improvement in perceived interfernce  of pain with ADL's. Ability to do home exercises independently. Ability to do household chores indoor and/or outdoor work and social and leisure activities. Improve psychosocial and physical functioning. - she is showing progression towards this treatment goal with the current regimen. She was advised against drinking alcohol with the narcotic pain medicines, advised against driving or handling machinery while adjusting the dose of medicines or if having cognitive  issues related to the current medications. Risk of overdose and death, if medicines not taken as prescribed, were also discussed. If the patient develops new symptoms or if the symptoms worsen, the patient should call the office. While transcribing every attempt was made to maintain the accuracy of the note in terms of it's contents,there may have been some errors made inadvertently. Thank you for allowing me to participate in the care of this patient.     Jayme Alvarado MD.    Cc: Dian Hobbs, APRN - CNP

## 2022-12-07 ENCOUNTER — OFFICE VISIT (OUTPATIENT)
Dept: PAIN MANAGEMENT | Age: 57
End: 2022-12-07
Payer: COMMERCIAL

## 2022-12-07 VITALS
BODY MASS INDEX: 30.41 KG/M2 | SYSTOLIC BLOOD PRESSURE: 137 MMHG | DIASTOLIC BLOOD PRESSURE: 88 MMHG | HEART RATE: 60 BPM | WEIGHT: 200 LBS

## 2022-12-07 DIAGNOSIS — M17.11 PRIMARY LOCALIZED OSTEOARTHROSIS OF RIGHT LOWER LEG: ICD-10-CM

## 2022-12-07 DIAGNOSIS — G89.4 CHRONIC PAIN SYNDROME: ICD-10-CM

## 2022-12-07 DIAGNOSIS — M54.16 LUMBAR RADICULOPATHY: ICD-10-CM

## 2022-12-07 DIAGNOSIS — M79.7 FIBROMYALGIA: ICD-10-CM

## 2022-12-07 DIAGNOSIS — T84.84XS PAIN IN PROSTHETIC JOINT, SEQUELA: ICD-10-CM

## 2022-12-07 PROCEDURE — 99213 OFFICE O/P EST LOW 20 MIN: CPT | Performed by: INTERNAL MEDICINE

## 2022-12-07 RX ORDER — TRAMADOL HYDROCHLORIDE 50 MG/1
50 TABLET ORAL EVERY 6 HOURS PRN
Qty: 84 TABLET | Refills: 0 | Status: SHIPPED | OUTPATIENT
Start: 2022-12-07 | End: 2023-01-04

## 2022-12-07 RX ORDER — DICLOFENAC SODIUM 75 MG/1
75 TABLET, DELAYED RELEASE ORAL DAILY PRN
Qty: 30 TABLET | Refills: 1 | Status: SHIPPED | OUTPATIENT
Start: 2022-12-07

## 2022-12-07 NOTE — PROGRESS NOTES
Janna Can  1965  [id number]      HISTORY OF PRESENT ILLNESS:  Ms. Veronika Johnston is a 61 y.o. female returns for a follow up visit for pain management  She has a diagnosis of   1. Chronic pain syndrome    2. 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 Primary localized osteoarthrosis of right lower leg    5. Fibromyalgia    . She complains of pain in the  Neck, upper and lower back. She rates the pain 7/10 and describes it as aching. Current treatment regimen has helped relieve about 70% of the pain. She denies any side effects from the current pain regimen. Patient reports that since the last follow up visit the physical functioning is unchanged, family/social relationships are unchanged, mood is unchanged sleep patterns are unchanged, and that the overall functioning is unchanged. Patient denies misusing/abusing her narcotic pain medications or using any illegal drugs. There are No indicators for possible drug abuse, addiction or diversion problems. Patient complains she is having pain in the right arm and elbow. She mentions its difficult to her her arm. She says the symptoms have been going on for 3 weeks and is unable to use her arm. She reports she is using Ultram 3 per day along with other adjuvants. She states she is working full time still     ALLERGIES: Patients list of allergies were reviewed     MEDICATIONS: Ms. Veronika Johnston list of medications were reviewed. Her current medications are   Outpatient Medications Prior to Visit   Medication Sig Dispense Refill    traMADol (ULTRAM) 50 MG tablet Take 1 tablet by mouth every 6 hours as needed for Pain (max 3 per day) for up to 35 days.  105 tablet 0    diclofenac (VOLTAREN) 75 MG EC tablet Take 1 tablet by mouth daily as needed for Pain 30 tablet 1    albuterol sulfate HFA (VENTOLIN HFA) 108 (90 Base) MCG/ACT inhaler Inhale 2 puffs into the lungs every 6 hours as needed for Wheezing 1 each 1    magnesium oxide (MGO) 400 (240 Mg) MG tablet Take 1 tablet by mouth nightly 30 tablet 1    Fluticasone Propionate (FLONASE NA) by Nasal route       melatonin 3 MG TABS tablet Take 3 mg by mouth nightly      gabapentin (NEURONTIN) 600 MG tablet Take 1 tablet by mouth 3 times daily for 30 days. 90 tablet 1     No facility-administered medications prior to visit. REVIEW OF SYSTEMS:    Respiratory: Negative for apnea, chest tightness and shortness of breath or change in baseline breathing. PHYSICAL EXAM:   Nursing note and vitals reviewed. /88   Pulse 60   Wt 200 lb (90.7 kg)   BMI 30.41 kg/m²   Constitutional: She appears well-developed and well-nourished. No acute distress. Cardiovascular: Normal rate, regular rhythm, normal heart sounds, and does not have murmur. Pulmonary/Chest: Effort normal. No respiratory distress. She does not have wheezes in the lung fields. She has no rales. Neurological/Psychiatric:She is alert and oriented to person, place, and time. Coordination is  normal.  Her mood isAppropriate and affect is Neutral/Euthymic(normal) . Other: + tenderness Right elbow, lateral condyle     IMPRESSION:   1. Chronic pain syndrome    2. 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 Primary localized osteoarthrosis of right lower leg    5. Fibromyalgia        PLAN:  Informed verbal consent was obtained  -[place] record was obtained and reviewed  for the last one year and no indicators of drug misuse  were found. Any other controlled substance prescriptions  seen on the record have been accounted for, I am aware of the patient receiving these medications. Ofelia Beckham [place] record will be rechecked as part of office protocol.    -Exercises given for elbow tendonitis   -Continue with Ultram 50 mg TID   -Continue with all other adjuvants   -Patient's urine drug screen results with GC/MS confirmation were obtained and reviewed and were negative for any illicit drugs.  Prescribed medications were within acceptable range. Current Outpatient Medications   Medication Sig Dispense Refill    traMADol (ULTRAM) 50 MG tablet Take 1 tablet by mouth every 6 hours as needed for Pain (max 3 per day) for up to 35 days. 105 tablet 0    diclofenac (VOLTAREN) 75 MG EC tablet Take 1 tablet by mouth daily as needed for Pain 30 tablet 1    albuterol sulfate HFA (VENTOLIN HFA) 108 (90 Base) MCG/ACT inhaler Inhale 2 puffs into the lungs every 6 hours as needed for Wheezing 1 each 1    magnesium oxide (MGO) 400 (240 Mg) MG tablet Take 1 tablet by mouth nightly 30 tablet 1    Fluticasone Propionate (FLONASE NA) by Nasal route       melatonin 3 MG TABS tablet Take 3 mg by mouth nightly      gabapentin (NEURONTIN) 600 MG tablet Take 1 tablet by mouth 3 times daily for 30 days. 90 tablet 1     No current facility-administered medications for this visit. I will continue her current medication regimen  which is part of the above treatment schedule. It has been helping with Ms. 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 Risks and benefits of the medications and other alternative treatments  including no treatment were discussed with the patient. The common side effects of these medications were also explained to the patient. Informed verbal consent was obtained. Goals of current treatment regimen include improvement in pain, restoration of functioning- with focus on improvement in physical performance, general activity, work or disability,emotional distress, health care utilization and  decreased medication consumption. Will continue to monitor progress towards achieving/maintaining therapeutic goals with special emphasis on  1. Improvement in perceived interfernce  of pain with ADL's.  Ability to do home exercises independently. Ability to do household chores indoor and/or outdoor work and social and leisure activities. Improve psychosocial and physical functioning. - she is showing progression towards this treatment goal with the current regimen. She was advised against drinking alcohol with the narcotic pain medicines, advised against driving or handling machinery while adjusting the dose of medicines or if having cognitive  issues related to the current medications. Risk of overdose and death, if medicines not taken as prescribed, were also discussed. If the patient develops new symptoms or if the symptoms worsen, the patient should call the office. While transcribing every attempt was made to maintain the accuracy of the note in terms of it's contents,there may have been some errors made inadvertently. Thank you for allowing me to participate in the care of this patient. Kiara Arreaga MD.    Cc:  primary care provider on file.

## 2023-01-04 ENCOUNTER — OFFICE VISIT (OUTPATIENT)
Dept: PAIN MANAGEMENT | Age: 58
End: 2023-01-04
Payer: COMMERCIAL

## 2023-01-04 VITALS
DIASTOLIC BLOOD PRESSURE: 79 MMHG | SYSTOLIC BLOOD PRESSURE: 133 MMHG | HEART RATE: 56 BPM | WEIGHT: 198 LBS | BODY MASS INDEX: 30.11 KG/M2

## 2023-01-04 DIAGNOSIS — M17.11 PRIMARY LOCALIZED OSTEOARTHROSIS OF RIGHT LOWER LEG: ICD-10-CM

## 2023-01-04 DIAGNOSIS — M79.7 FIBROMYALGIA: ICD-10-CM

## 2023-01-04 DIAGNOSIS — M54.16 LUMBAR RADICULOPATHY: ICD-10-CM

## 2023-01-04 DIAGNOSIS — T84.84XS PAIN IN PROSTHETIC JOINT, SEQUELA: ICD-10-CM

## 2023-01-04 DIAGNOSIS — G89.4 CHRONIC PAIN SYNDROME: ICD-10-CM

## 2023-01-04 PROCEDURE — 99213 OFFICE O/P EST LOW 20 MIN: CPT | Performed by: INTERNAL MEDICINE

## 2023-01-04 RX ORDER — GABAPENTIN 600 MG/1
600 TABLET ORAL 3 TIMES DAILY
Qty: 90 TABLET | Refills: 1 | Status: SHIPPED | OUTPATIENT
Start: 2023-01-04 | End: 2023-02-03

## 2023-01-04 RX ORDER — TRAMADOL HYDROCHLORIDE 50 MG/1
50 TABLET ORAL 3 TIMES DAILY
Qty: 84 TABLET | Refills: 0 | Status: SHIPPED | OUTPATIENT
Start: 2023-01-04 | End: 2023-02-01

## 2023-01-04 RX ORDER — DICLOFENAC SODIUM 75 MG/1
75 TABLET, DELAYED RELEASE ORAL DAILY PRN
Qty: 30 TABLET | Refills: 1 | Status: SHIPPED | OUTPATIENT
Start: 2023-01-04

## 2023-01-04 NOTE — PROGRESS NOTES
Tianna Dawn  1965  [id number]      HISTORY OF PRESENT ILLNESS:  Ms. Ancelmo Valladares is a 61 y.o. female returns for a follow up visit for pain management  She has a diagnosis of   1. Chronic pain syndrome    2. 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 Primary localized osteoarthrosis of right lower leg    5. Fibromyalgia    6. Primary insomnia    7. Mood disorder ([address]    . She complains of pain in the  lower back, bilateral hands, bilateral knees, left ankle  She rates the pain 7/10 and describes it as aching, burning, pins and needles. Current treatment regimen has helped relieve about 70% of the pain. She denies any side effects from the current pain regimen. Patient reports that since the last follow up visit the physical functioning is unchanged, family/social relationships are unchanged, mood is unchanged sleep patterns are unchanged, and that the overall functioning is unchanged. Patient denies misusing/abusing her narcotic pain medications or using any illegal drugs. There are No indicators for possible drug abuse, addiction or diversion problems. Patient states she has been feeling down, she states her mother has a terminal illness. Ms. Ancelmo Valladares says she has been compliant with the medications. Patient mentions she is using Neurontin along with Tramadol. She states everything hurts, \"Fibromyalgia running through my body. \"     ALLERGIES: Patients list of allergies were reviewed     MEDICATIONS: Ms. Ancelmo Valladares list of medications were reviewed. Her current medications are   Outpatient Medications Prior to Visit   Medication Sig Dispense Refill    traMADol (ULTRAM) 50 MG tablet Take 1 tablet by mouth every 6 hours as needed for Pain (max 3 per day) for up to 28 days.  84 tablet 0    diclofenac (VOLTAREN) 75 MG EC tablet Take 1 tablet by mouth daily as needed for Pain 30 tablet 1    diclofenac sodium (VOLTAREN) 1 % GEL Apply 2-4 grams to affected area  g 3    albuterol sulfate HFA (VENTOLIN HFA) 108 (90 Base) MCG/ACT inhaler Inhale 2 puffs into the lungs every 6 hours as needed for Wheezing 1 each 1    magnesium oxide (MGO) 400 (240 Mg) MG tablet Take 1 tablet by mouth nightly 30 tablet 1    Fluticasone Propionate (FLONASE NA) by Nasal route       melatonin 3 MG TABS tablet Take 3 mg by mouth nightly      gabapentin (NEURONTIN) 600 MG tablet Take 1 tablet by mouth 3 times daily for 30 days. 90 tablet 1     No facility-administered medications prior to visit. REVIEW OF SYSTEMS:    Respiratory: Negative for apnea, chest tightness and shortness of breath or change in baseline breathing. PHYSICAL EXAM:   Nursing note and vitals reviewed. /79   Pulse 56   Wt 198 lb (89.8 kg)   BMI 30.11 kg/m²   Constitutional: She appears well-developed and well-nourished. No acute distress. Cardiovascular: Normal rate, regular rhythm, normal heart sounds, and does not have murmur. Pulmonary/Chest: Effort normal. No respiratory distress. She does not have wheezes in the lung fields. She has no rales. Neurological/Psychiatric:She is alert and oriented to person, place, and time. Coordination is  normal.  Her mood isAppropriate and affect is Neutral/Euthymic(normal) . Her    IMPRESSION:   1. Chronic pain syndrome    2. 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 Primary localized osteoarthrosis of right lower leg    5. Fibromyalgia        PLAN:  Informed verbal consent was obtained  -[place] record was obtained and reviewed  for the last one year and no indicators of drug misuse  were found. Any other controlled substance prescriptions  seen on the record have been accounted for, I am aware of the patient receiving these medications. Maurice Mccurdy [place] record will be rechecked as part of office protocol.     -ROM/Stretching exercises as advised   -She was advised to increase fluids ( 5-7  glasses of fluid daily), limit caffeine, avoid cheese products, increase dietary fiber, increase activity and exercise as tolerated and relax regularly and enjoy meals   -Continue with Ultram 3 per day  -Continue with Neurontin 600 mg TID   -CBT techniques- relaxation therapies such as biofeedback, mindfulness based stress reduction, imagery, cognitive restructuring, problem solving discussed with patient    Current Outpatient Medications   Medication Sig Dispense Refill    traMADol (ULTRAM) 50 MG tablet Take 1 tablet by mouth every 6 hours as needed for Pain (max 3 per day) for up to 28 days. 84 tablet 0    diclofenac (VOLTAREN) 75 MG EC tablet Take 1 tablet by mouth daily as needed for Pain 30 tablet 1    diclofenac sodium (VOLTAREN) 1 % GEL Apply 2-4 grams to affected area  g 3    albuterol sulfate HFA (VENTOLIN HFA) 108 (90 Base) MCG/ACT inhaler Inhale 2 puffs into the lungs every 6 hours as needed for Wheezing 1 each 1    magnesium oxide (MGO) 400 (240 Mg) MG tablet Take 1 tablet by mouth nightly 30 tablet 1    Fluticasone Propionate (FLONASE NA) by Nasal route       melatonin 3 MG TABS tablet Take 3 mg by mouth nightly      gabapentin (NEURONTIN) 600 MG tablet Take 1 tablet by mouth 3 times daily for 30 days. 90 tablet 1     No current facility-administered medications for this visit. I will continue her current medication regimen  which is part of the above treatment schedule. It has been helping with Ms. 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 Risks and benefits of the medications and other alternative treatments  including no treatment were discussed with the patient. The common side effects of these medications were also explained to the patient. Informed verbal consent was obtained.    Goals of current treatment regimen include improvement in pain, restoration of functioning- with focus on improvement in physical performance, general activity, work or disability,emotional distress, health care utilization and  decreased medication consumption. Will continue to monitor progress towards achieving/maintaining therapeutic goals with special emphasis on  1. Improvement in perceived interfernce  of pain with ADL's. Ability to do home exercises independently. Ability to do household chores indoor and/or outdoor work and social and leisure activities. Improve psychosocial and physical functioning. - she is showing progression towards this treatment goal with the current regimen. She was advised against drinking alcohol with the narcotic pain medicines, advised against driving or handling machinery while adjusting the dose of medicines or if having cognitive  issues related to the current medications. Risk of overdose and death, if medicines not taken as prescribed, were also discussed. If the patient develops new symptoms or if the symptoms worsen, the patient should call the office. While transcribing every attempt was made to maintain the accuracy of the note in terms of it's contents,there may have been some errors made inadvertently. Thank you for allowing me to participate in the care of this patient. Jayme Alvarado MD.    Cc:  primary care provider on file.

## 2023-01-24 ENCOUNTER — TELEPHONE (OUTPATIENT)
Dept: PRIMARY CARE CLINIC | Age: 58
End: 2023-01-24

## 2023-01-24 NOTE — TELEPHONE ENCOUNTER
Previous Quincy Lopez, CNP pt. Last OV: 10/11/22      Has a NTP appt with Dr Moy Carroll on 2/22/23    No previous Rx's for this med. Per [facility] note:     QUESTIONS   Name of Medication? Other - LORazepam (ATIVAN) 0.5 MG tablet [[id number]]   Patient-reported dosage and instructions? Take 1 tablet by mouth 2 times   daily as needed for Anxiety for up to 30 days. How many days do you have left? 0   Preferred [facility]acy? [phone] [id number]   Pharmacy phone number (if available)? [phone]   Additional Information for Provider? Eyvonne Gaucher had a death in the family and   her anxiety is really bad. Please call with questions, concerns and if   there's a problem filling this. Voicemail not set up she said you can text   her.

## 2023-01-25 RX ORDER — HYDROXYZINE PAMOATE 25 MG/1
25 CAPSULE ORAL 3 TIMES DAILY PRN
Qty: 30 CAPSULE | Refills: 0 | Status: SHIPPED | OUTPATIENT
Start: 2023-01-25 | End: 2023-02-08

## 2023-02-01 ENCOUNTER — OFFICE VISIT (OUTPATIENT)
Dept: PAIN MANAGEMENT | Age: 58
End: 2023-02-01
Payer: COMMERCIAL

## 2023-02-01 VITALS
DIASTOLIC BLOOD PRESSURE: 87 MMHG | BODY MASS INDEX: 31.17 KG/M2 | WEIGHT: 205 LBS | SYSTOLIC BLOOD PRESSURE: 134 MMHG | HEART RATE: 59 BPM

## 2023-02-01 DIAGNOSIS — M17.11 PRIMARY LOCALIZED OSTEOARTHROSIS OF RIGHT LOWER LEG: ICD-10-CM

## 2023-02-01 DIAGNOSIS — T84.84XS PAIN IN PROSTHETIC JOINT, SEQUELA: ICD-10-CM

## 2023-02-01 DIAGNOSIS — M79.7 FIBROMYALGIA: ICD-10-CM

## 2023-02-01 DIAGNOSIS — G89.4 CHRONIC PAIN SYNDROME: ICD-10-CM

## 2023-02-01 DIAGNOSIS — M54.16 LUMBAR RADICULOPATHY: ICD-10-CM

## 2023-02-01 PROCEDURE — 99213 OFFICE O/P EST LOW 20 MIN: CPT | Performed by: INTERNAL MEDICINE

## 2023-02-01 RX ORDER — TRAMADOL HYDROCHLORIDE 50 MG/1
50 TABLET ORAL 3 TIMES DAILY
Qty: 84 TABLET | Refills: 0 | Status: SHIPPED | OUTPATIENT
Start: 2023-02-01 | End: 2023-03-01

## 2023-02-01 RX ORDER — DICLOFENAC SODIUM 75 MG/1
75 TABLET, DELAYED RELEASE ORAL DAILY PRN
Qty: 30 TABLET | Refills: 1 | Status: SHIPPED | OUTPATIENT
Start: 2023-02-01

## 2023-02-01 NOTE — PROGRESS NOTES
Sherice Sorensennancy  1965  [id number]      HISTORY OF PRESENT ILLNESS:  Ms. Luis Aguilar is a 61 y.o. female returns for a follow up visit for pain management  She has a diagnosis of   1. Chronic pain syndrome    2. 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 Primary localized osteoarthrosis of right lower leg    5. Fibromyalgia    . She complains of pain in the  Neck, loewr back, bilateral knees, left hand   She rates the pain 7/10 and describes it as sharp, aching, burning, pins and needles. Current treatment regimen has helped relieve about 70% of the pain. She denies any side effects from the current pain regimen. Patient reports that since the last follow up visit the physical functioning is unchanged, family/social relationships are unchanged, mood is unchanged sleep patterns are unchanged, and that the overall functioning is unchanged. Patient denies misusing/abusing her narcotic pain medications or using any illegal drugs. There are No indicators for possible drug abuse, addiction or diversion problems. Patient states she has been doing fair, pain has been baseline. She mentions is using Neurontin along with Voltaren and Ultram 3 per day. She denies any constipation symptoms. She reports she is working full time. She states her mother has passed away recently. ALLERGIES: Patients list of allergies were reviewed     MEDICATIONS: Ms. Luis Aguilar list of medications were reviewed. Her current medications are   Outpatient Medications Prior to Visit   Medication Sig Dispense Refill    hydrOXYzine pamoate (VISTARIL) 25 MG capsule Take 1 capsule by mouth 3 times daily as needed for Anxiety 30 capsule 0    traMADol (ULTRAM) 50 MG tablet Take 1 tablet by mouth 3 times daily for 28 days.  Max Daily Amount: 150 mg 84 tablet 0    diclofenac (VOLTAREN) 75 MG EC tablet Take 1 tablet by mouth daily as needed for Pain 30 tablet 1    gabapentin (NEURONTIN) 600 MG tablet Take 1 tablet by mouth 3 times daily for 30 days. 90 tablet 1    diclofenac sodium (VOLTAREN) 1 % GEL Apply 2-4 grams to affected area  g 3    albuterol sulfate HFA (VENTOLIN HFA) 108 (90 Base) MCG/ACT inhaler Inhale 2 puffs into the lungs every 6 hours as needed for Wheezing 1 each 1    magnesium oxide (MGO) 400 (240 Mg) MG tablet Take 1 tablet by mouth nightly 30 tablet 1    Fluticasone Propionate (FLONASE NA) by Nasal route       melatonin 3 MG TABS tablet Take 3 mg by mouth nightly       No facility-administered medications prior to visit. REVIEW OF SYSTEMS:    Respiratory: Negative for apnea, chest tightness and shortness of breath or change in baseline breathing. PHYSICAL EXAM:   Nursing note and vitals reviewed. /87   Pulse 59   Wt 205 lb (93 kg)   BMI 31.17 kg/m²   Constitutional: She appears well-developed and well-nourished. No acute distress. Cardiovascular: Normal rate, regular rhythm, normal heart sounds, and does not have murmur. Pulmonary/Chest: Effort normal. No respiratory distress. She does not have wheezes in the lung fields. She has no rales. Neurological/Psychiatric:She is alert and oriented to person, place, and time. Coordination is  normal.  Her mood isAppropriate and affect is Neutral/Euthymic(normal) . Other: Trace of edema     IMPRESSION:   1. Chronic pain syndrome    2. 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 Primary localized osteoarthrosis of right lower leg    5.  Fibromyalgia        PLAN:  Informed verbal consent was obtained  -ROM/Stretching exercises as advised   -She was advised to increase fluids ( 5-7  glasses of fluid daily), limit caffeine, avoid cheese products, increase dietary fiber, increase activity and exercise as tolerated and relax regularly and enjoy meals   -Walking as tolerated   -Continue with Ultram 50 mg TID   -CBT techniques- relaxation therapies such as biofeedback, mindfulness based stress reduction, imagery, cognitive restructuring, problem solving discussed with patient    Current Outpatient Medications   Medication Sig Dispense Refill    hydrOXYzine pamoate (VISTARIL) 25 MG capsule Take 1 capsule by mouth 3 times daily as needed for Anxiety 30 capsule 0    traMADol (ULTRAM) 50 MG tablet Take 1 tablet by mouth 3 times daily for 28 days. Max Daily Amount: 150 mg 84 tablet 0    diclofenac (VOLTAREN) 75 MG EC tablet Take 1 tablet by mouth daily as needed for Pain 30 tablet 1    gabapentin (NEURONTIN) 600 MG tablet Take 1 tablet by mouth 3 times daily for 30 days. 90 tablet 1    diclofenac sodium (VOLTAREN) 1 % GEL Apply 2-4 grams to affected area  g 3    albuterol sulfate HFA (VENTOLIN HFA) 108 (90 Base) MCG/ACT inhaler Inhale 2 puffs into the lungs every 6 hours as needed for Wheezing 1 each 1    magnesium oxide (MGO) 400 (240 Mg) MG tablet Take 1 tablet by mouth nightly 30 tablet 1    Fluticasone Propionate (FLONASE NA) by Nasal route       melatonin 3 MG TABS tablet Take 3 mg by mouth nightly       No current facility-administered medications for this visit. I will continue her current medication regimen  which is part of the above treatment schedule. It has been helping with Ms. 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 Risks and benefits of the medications and other alternative treatments  including no treatment were discussed with the patient. The common side effects of these medications were also explained to the patient. Informed verbal consent was obtained.    Goals of current treatment regimen include improvement in pain, restoration of functioning- with focus on improvement in physical performance, general activity, work or disability,emotional distress, health care utilization and  decreased medication consumption. Will continue to monitor progress towards achieving/maintaining therapeutic goals with special emphasis on  1. Improvement in perceived interfernce  of pain with ADL's. Ability to do home exercises independently. Ability to do household chores indoor and/or outdoor work and social and leisure activities. Improve psychosocial and physical functioning. - she is showing progression towards this treatment goal with the current regimen. 2. Ability to focus/concentrate at work and perform the duties required of her at work  Sit through a workday without lower extremity symptoms. Stand 30-60 minutes without lower extremity symptoms. Ability to lift up to 10-20 lbs. Ability to go up and down stairs. Sit 30-60 minutes  Without having to stand up frequently. - she is maintaining/progressing towards her work related goals with the current regimen. She was advised against drinking alcohol with the narcotic pain medicines, advised against driving or handling machinery while adjusting the dose of medicines or if having cognitive  issues related to the current medications. Risk of overdose and death, if medicines not taken as prescribed, were also discussed. If the patient develops new symptoms or if the symptoms worsen, the patient should call the office. While transcribing every attempt was made to maintain the accuracy of the note in terms of it's contents,there may have been some errors made inadvertently. Thank you for allowing me to participate in the care of this patient. Darby Gallo MD.    Cc:  primary care provider on file.

## 2023-02-22 ENCOUNTER — OFFICE VISIT (OUTPATIENT)
Dept: PRIMARY CARE CLINIC | Age: 58
End: 2023-02-22
Payer: COMMERCIAL

## 2023-02-22 VITALS
SYSTOLIC BLOOD PRESSURE: 142 MMHG | RESPIRATION RATE: 18 BRPM | OXYGEN SATURATION: 97 % | WEIGHT: 203 LBS | TEMPERATURE: 97.2 F | BODY MASS INDEX: 30.87 KG/M2 | DIASTOLIC BLOOD PRESSURE: 86 MMHG | HEART RATE: 49 BPM

## 2023-02-22 DIAGNOSIS — F41.9 ANXIETY: ICD-10-CM

## 2023-02-22 DIAGNOSIS — R03.0 ELEVATED BP WITHOUT DIAGNOSIS OF HYPERTENSION: ICD-10-CM

## 2023-02-22 DIAGNOSIS — M79.7 FIBROMYALGIA: ICD-10-CM

## 2023-02-22 DIAGNOSIS — J45.20 MILD INTERMITTENT ASTHMA WITHOUT COMPLICATION: ICD-10-CM

## 2023-02-22 DIAGNOSIS — G89.4 CHRONIC PAIN SYNDROME: ICD-10-CM

## 2023-02-22 DIAGNOSIS — Z76.89 ENCOUNTER TO ESTABLISH CARE: ICD-10-CM

## 2023-02-22 DIAGNOSIS — R73.03 PREDIABETES: ICD-10-CM

## 2023-02-22 PROCEDURE — 99204 OFFICE O/P NEW MOD 45 MIN: CPT | Performed by: FAMILY MEDICINE

## 2023-02-22 RX ORDER — HYDROXYZINE PAMOATE 25 MG/1
25 CAPSULE ORAL 3 TIMES DAILY PRN
Qty: 30 CAPSULE | Refills: 0 | Status: SHIPPED | OUTPATIENT
Start: 2023-02-22 | End: 2023-03-08

## 2023-02-22 ASSESSMENT — ENCOUNTER SYMPTOMS
CONSTIPATION: 0
DIARRHEA: 0
BLOOD IN STOOL: 0
SHORTNESS OF BREATH: 0
ABDOMINAL PAIN: 0

## 2023-02-22 ASSESSMENT — PATIENT HEALTH QUESTIONNAIRE - PHQ9
SUM OF ALL RESPONSES TO PHQ9 QUESTIONS 1 & 2: 2
SUM OF ALL RESPONSES TO PHQ QUESTIONS 1-9: 2
2. FEELING DOWN, DEPRESSED OR HOPELESS: 1
SUM OF ALL RESPONSES TO PHQ QUESTIONS 1-9: 2
SUM OF ALL RESPONSES TO PHQ QUESTIONS 1-9: 2
1. LITTLE INTEREST OR PLEASURE IN DOING THINGS: 1
SUM OF ALL RESPONSES TO PHQ QUESTIONS 1-9: 2

## 2023-02-22 NOTE — PROGRESS NOTES
2023    Madison Matthews (:  1965) is a 61 y.o. female, here for evaluation of the following medical concerns:    HPI  And presented to the office to establish care. She is somewhat obese with elevated blood pressure without diagnosis of hypertension and blood pressure today is somewhat elevated but requested not to be started on medicine. She has asthma intermittent controlled with albuterol inhaler. She has anxiety and requested anxiety medication. She has a chronic pain and fibromyalgia goes to pain management, takes tramadol 50 mg 3 times a day and gabapentin 600 mg twice daily. She also has impaired fasting glucose but no overt sign of diabetes, denies polyuria polydipsia. She denies chest pain or shortness of breath. Denies bowel or urinary disturbance. She work at [employer]. She is a non-smoker     Review of Systems   Constitutional:  Negative for activity change and appetite change. Eyes:  Negative for visual disturbance. Respiratory:  Negative for shortness of breath. Cardiovascular:  Negative for chest pain and leg swelling. Gastrointestinal:  Negative for abdominal pain, blood in stool, constipation and diarrhea. Genitourinary:  Negative for difficulty urinating, frequency, hematuria, menstrual problem and urgency. Neurological:  Negative for dizziness and syncope. Psychiatric/Behavioral:  Negative for behavioral problems. Prior to Visit Medications    Medication Sig Taking? Authorizing Provider   hydrOXYzine pamoate (VISTARIL) 25 MG capsule Take 1 capsule by mouth 3 times daily as needed for Anxiety Yes Agnieszka Kendrick MD   traMADol (ULTRAM) 50 MG tablet Take 1 tablet by mouth 3 times daily for 28 days.  Yes Mitchell Betancourt MD   diclofenac (VOLTAREN) 75 MG EC tablet Take 1 tablet by mouth daily as needed for Pain Yes Mitchell Betancourt MD   diclofenac sodium (VOLTAREN) 1 % GEL Apply 2-4 grams to affected area BID Yes Mitchell Betancourt MD   albuterol sulfate HFA (VENTOLIN HFA) 108 (90 Base) MCG/ACT inhaler Inhale 2 puffs into the lungs every 6 hours as needed for Wheezing Yes Felicitaon Showers, APRN - CNP   magnesium oxide (MGO) 400 (240 Mg) MG tablet Take 1 tablet by mouth nightly Yes David Carney MD   melatonin 3 MG TABS tablet Take 3 mg by mouth nightly Yes Historical Provider, MD   gabapentin (NEURONTIN) 600 MG tablet Take 1 tablet by mouth 3 times daily for 30 days.   David Carney MD   Fluticasone Propionate (FLONASE NA) by Nasal route   Historical Provider, MD        Allergies   Allergen Reactions    Garlic      Respiratory Distress    Penicillins Nausea Only     weak    Demerol Hcl [Meperidine] Nausea And Vomiting and Itching     weak    Erythromycin Nausea Only and Nausea And Vomiting       Past Medical History:   Diagnosis Date    Anxiety     Arthritis     Asthma     Osteoarthritis        Past Surgical History:   Procedure Laterality Date    HYSTERECTOMY, TOTAL ABDOMINAL (CERVIX REMOVED)  2014    fibroids    KNEE ARTHROPLASTY Right 8-19-15    KNEE JOINT MANIPULATION Right 10/02/2015       Social History     Socioeconomic History    Marital status: Single     Spouse name: Not on file    Number of children: 0    Years of education: Not on file    Highest education level: Not on file   Occupational History    Occupation: [address] [place] [address]    Tobacco Use    Smoking status: Never    Smokeless tobacco: Never   Vaping Use    Vaping Use: Never used   Substance and Sexual Activity    Alcohol use: No     Alcohol/week: 0.0 standard drinks    Drug use: No    Sexual activity: Not Currently     Partners: Male   Other Topics Concern    Not on file   Social History Narrative    Lives alone     Social Determinants of Health     Financial Resource Strain: Low Risk     Difficulty of Paying Living Expenses: Not very hard   Food Insecurity: No Food Insecurity    Worried About Running Out of Food in the Last Year: Never true    Ran Out of Food in the Last Year: Never true Transportation Needs: Not on file   Physical Activity: Not on file   Stress: Not on file   Social Connections: Not on file   Intimate Partner Violence: Not on file   Housing Stability: Not on file        Family History   Problem Relation Age of Onset    Asthma Mother     Diabetes Mother     Heart Disease Father     Diabetes Maternal Grandmother        Vitals:    02/22/23 1434 02/22/23 1444   BP: (!) 173/95 (!) 142/88   Pulse: (!) 49    Resp: 18    Temp: 97.2 °F (36.2 °C)    TempSrc: Temporal    SpO2: 97%    Weight: 203 lb (92.1 kg)      Estimated body mass index is 30.87 kg/m² as calculated from the following:    Height as of 10/11/22: 5' 8\" (1.727 m). Weight as of this encounter: 203 lb (92.1 kg). Physical Exam  Constitutional:       Appearance: She is well-developed. HENT:      Head: Normocephalic. Right Ear: External ear normal.      Nose: Nose normal.   Eyes:      Conjunctiva/sclera: Conjunctivae normal.      Pupils: Pupils are equal, round, and reactive to light. Neck:      Thyroid: No thyromegaly. Cardiovascular:      Rate and Rhythm: Normal rate and regular rhythm. Heart sounds: Normal heart sounds. No murmur heard. No friction rub. Pulmonary:      Effort: Pulmonary effort is normal.      Breath sounds: Normal breath sounds. Abdominal:      General: Bowel sounds are normal.      Palpations: Abdomen is soft. There is no mass. Musculoskeletal:         General: Normal range of motion. Cervical back: Normal range of motion and neck supple. Lymphadenopathy:      Cervical: No cervical adenopathy. Skin:     General: Skin is warm. Findings: No rash. Neurological:      Mental Status: She is alert and oriented to person, place, and time. ASSESSMENT/PLAN:  1. Encounter to establish care  Patient due for blood test next visit. 2. Mild intermittent asthma without complication  Controlled. Continue albuterol inhaler.     3. Elevated BP without diagnosis of hypertension  She requested not to start on medication. 4. Anxiety  Will try Vistaril 25 mg 3 times a day as needed. 5. Fibromyalgia /6. Chronic pain syndrome  She goes to pain management and takes tramadol 50 mg 3 times a day as well as Voltaren 75 mg as needed    7. Prediabetes  No overt sign of diabetes denies polyuria polydipsia. Will check fasting blood sugar and HbA1c next blood draw.       RTC in 2-3 mos    An  electronic signature was used to authenticate this note.    --Trupti Gordon MD on 2/22/2023 at 3:04 PM

## 2023-03-01 ENCOUNTER — OFFICE VISIT (OUTPATIENT)
Dept: PAIN MANAGEMENT | Age: 58
End: 2023-03-01
Payer: COMMERCIAL

## 2023-03-01 VITALS
BODY MASS INDEX: 31.07 KG/M2 | HEIGHT: 68 IN | HEART RATE: 65 BPM | WEIGHT: 205 LBS | SYSTOLIC BLOOD PRESSURE: 123 MMHG | DIASTOLIC BLOOD PRESSURE: 74 MMHG | OXYGEN SATURATION: 97 %

## 2023-03-01 DIAGNOSIS — M54.16 LUMBAR RADICULOPATHY: ICD-10-CM

## 2023-03-01 DIAGNOSIS — T84.84XS PAIN IN PROSTHETIC JOINT, SEQUELA: ICD-10-CM

## 2023-03-01 DIAGNOSIS — M79.7 FIBROMYALGIA: ICD-10-CM

## 2023-03-01 DIAGNOSIS — M17.11 PRIMARY LOCALIZED OSTEOARTHROSIS OF RIGHT LOWER LEG: ICD-10-CM

## 2023-03-01 DIAGNOSIS — G89.4 CHRONIC PAIN SYNDROME: ICD-10-CM

## 2023-03-01 PROCEDURE — 99213 OFFICE O/P EST LOW 20 MIN: CPT | Performed by: INTERNAL MEDICINE

## 2023-03-01 RX ORDER — TRAMADOL HYDROCHLORIDE 50 MG/1
50 TABLET ORAL 3 TIMES DAILY
Qty: 105 TABLET | Refills: 0 | Status: SHIPPED | OUTPATIENT
Start: 2023-03-01 | End: 2023-04-05

## 2023-03-01 RX ORDER — DICLOFENAC SODIUM 75 MG/1
75 TABLET, DELAYED RELEASE ORAL DAILY PRN
Qty: 30 TABLET | Refills: 1 | Status: SHIPPED | OUTPATIENT
Start: 2023-03-01

## 2023-03-01 RX ORDER — GABAPENTIN 600 MG/1
600 TABLET ORAL 3 TIMES DAILY
Qty: 90 TABLET | Refills: 1 | Status: SHIPPED | OUTPATIENT
Start: 2023-03-01 | End: 2023-03-31

## 2023-03-01 NOTE — PROGRESS NOTES
Moy Camejo  1965  [id number]      HISTORY OF PRESENT ILLNESS:  Ms. Reed Cruz is a 61 y.o. female returns for a follow up visit for pain management  She has a diagnosis of   1. Chronic pain syndrome    2. 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 Fibromyalgia    5. Primary localized osteoarthrosis of right lower leg    . She complains of pain in the  lower back, bilateral knees,bilateral hands, left ankle  She rates the pain 7/10 and describes it as aching, burning. Current treatment regimen has helped relieve about 70% of the pain. She denies any side effects from the current pain regimen. Patient reports that since the last follow up visit the physical functioning is unchanged, family/social relationships are unchanged, mood is unchanged sleep patterns are unchanged, and that the overall functioning is unchanged. Patient denies misusing/abusing her narcotic pain medications or using any illegal drugs. There are No indicators for possible drug abuse, addiction or diversion problems. Patient states she has been doing fair. Ms. Reed Cruz states she has been compliant with her medications. She says she has been sick for a few days with a GI virus. Patient mentions she is using Neurontin 1800 mg along with Ultram 3 per day. Patient denies any constipation symptoms. She is complaining of increase pain from social stressors, she states she \"hurts everywhere. \"     ALLERGIES: Patients list of allergies were reviewed     MEDICATIONS: Ms. Reed Cruz list of medications were reviewed. Her current medications are   Outpatient Medications Prior to Visit   Medication Sig Dispense Refill    hydrOXYzine pamoate (VISTARIL) 25 MG capsule Take 1 capsule by mouth 3 times daily as needed for Anxiety 30 capsule 0    traMADol (ULTRAM) 50 MG tablet Take 1 tablet by mouth 3 times daily for 28 days.  84 tablet 0    diclofenac (VOLTAREN) 75 MG EC tablet Take 1 tablet by mouth daily as needed for Pain 30 tablet 1    gabapentin (NEURONTIN) 600 MG tablet Take 1 tablet by mouth 3 times daily for 30 days. 90 tablet 1    diclofenac sodium (VOLTAREN) 1 % GEL Apply 2-4 grams to affected area  g 3    albuterol sulfate HFA (VENTOLIN HFA) 108 (90 Base) MCG/ACT inhaler Inhale 2 puffs into the lungs every 6 hours as needed for Wheezing 1 each 1    magnesium oxide (MGO) 400 (240 Mg) MG tablet Take 1 tablet by mouth nightly 30 tablet 1    Fluticasone Propionate (FLONASE NA) by Nasal route       melatonin 3 MG TABS tablet Take 3 mg by mouth nightly       No facility-administered medications prior to visit. REVIEW OF SYSTEMS:    Respiratory: Negative for apnea, chest tightness and shortness of breath or change in baseline breathing. PHYSICAL EXAM:   Nursing note and vitals reviewed. /74   Pulse 65   Ht 5' 8\" (1.727 m)   Wt 205 lb (93 kg)   SpO2 97%   BMI 31.17 kg/m²   Constitutional: She appears well-developed and well-nourished. No acute distress. Cardiovascular: Normal rate, regular rhythm, normal heart sounds, and does not have murmur. Pulmonary/Chest: Effort normal. No respiratory distress. She does not have wheezes in the lung fields. She has no rales. Neurological/Psychiatric:She is alert and oriented to person, place, and time. Coordination is  normal.  Her mood isAppropriate and affect is Neutral/Euthymic(normal) . Her    IMPRESSION:   1. Chronic pain syndrome    2. 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 Fibromyalgia    5. Primary localized osteoarthrosis of right lower leg        PLAN:  Informed verbal consent was obtained  -[place] record was obtained and reviewed  for the last one year and no indicators of drug misuse  were found. Any other controlled substance prescriptions  seen on the record have been accounted for, I am aware of the patient receiving these medications. James Nur [place] record will be rechecked as part of office protocol.     -ROM/Stretching exercises as advised   -She was advised to increase fluids ( 5-7  glasses of fluid daily), limit caffeine, avoid cheese products, increase dietary fiber, increase activity and exercise as tolerated and relax regularly and enjoy meals   -Continue with Ultram 3 per day  -Continue with all other adjuvant medications as before    Current Outpatient Medications   Medication Sig Dispense Refill    hydrOXYzine pamoate (VISTARIL) 25 MG capsule Take 1 capsule by mouth 3 times daily as needed for Anxiety 30 capsule 0    traMADol (ULTRAM) 50 MG tablet Take 1 tablet by mouth 3 times daily for 28 days. 84 tablet 0    diclofenac (VOLTAREN) 75 MG EC tablet Take 1 tablet by mouth daily as needed for Pain 30 tablet 1    gabapentin (NEURONTIN) 600 MG tablet Take 1 tablet by mouth 3 times daily for 30 days. 90 tablet 1    diclofenac sodium (VOLTAREN) 1 % GEL Apply 2-4 grams to affected area  g 3    albuterol sulfate HFA (VENTOLIN HFA) 108 (90 Base) MCG/ACT inhaler Inhale 2 puffs into the lungs every 6 hours as needed for Wheezing 1 each 1    magnesium oxide (MGO) 400 (240 Mg) MG tablet Take 1 tablet by mouth nightly 30 tablet 1    Fluticasone Propionate (FLONASE NA) by Nasal route       melatonin 3 MG TABS tablet Take 3 mg by mouth nightly       No current facility-administered medications for this visit. I will continue her current medication regimen  which is part of the above treatment schedule. It has been helping with Ms. 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 Risks and benefits of the medications and other alternative treatments  including no treatment were discussed with the patient. The common side effects of these medications were also explained to the patient.   Informed verbal consent was obtained. Goals of current treatment regimen include improvement in pain, restoration of functioning- with focus on improvement in physical performance, general activity, work or disability,emotional distress, health care utilization and  decreased medication consumption. Will continue to monitor progress towards achieving/maintaining therapeutic goals with special emphasis on  1. Improvement in perceived interfernce  of pain with ADL's. Ability to do home exercises independently. Ability to do household chores indoor and/or outdoor work and social and leisure activities. Improve psychosocial and physical functioning. - she is showing progression towards this treatment goal with the current regimen. She was advised against drinking alcohol with the narcotic pain medicines, advised against driving or handling machinery while adjusting the dose of medicines or if having cognitive  issues related to the current medications. Risk of overdose and death, if medicines not taken as prescribed, were also discussed. If the patient develops new symptoms or if the symptoms worsen, the patient should call the office. While transcribing every attempt was made to maintain the accuracy of the note in terms of it's contents,there may have been some errors made inadvertently. Thank you for allowing me to participate in the care of this patient.     Denisa Diallo MD.    Cc: Vern Carmona MD

## 2023-05-03 ENCOUNTER — OFFICE VISIT (OUTPATIENT)
Dept: PAIN MANAGEMENT | Age: 58
End: 2023-05-03
Payer: COMMERCIAL

## 2023-05-03 VITALS
WEIGHT: 212 LBS | SYSTOLIC BLOOD PRESSURE: 146 MMHG | BODY MASS INDEX: 32.23 KG/M2 | HEART RATE: 63 BPM | DIASTOLIC BLOOD PRESSURE: 84 MMHG

## 2023-05-03 DIAGNOSIS — M79.7 FIBROMYALGIA: ICD-10-CM

## 2023-05-03 DIAGNOSIS — M17.11 PRIMARY LOCALIZED OSTEOARTHROSIS OF RIGHT LOWER LEG: ICD-10-CM

## 2023-05-03 DIAGNOSIS — M54.16 LUMBAR RADICULOPATHY: ICD-10-CM

## 2023-05-03 DIAGNOSIS — G89.4 CHRONIC PAIN SYNDROME: ICD-10-CM

## 2023-05-03 DIAGNOSIS — T84.84XS PAIN IN PROSTHETIC JOINT, SEQUELA: ICD-10-CM

## 2023-05-03 PROCEDURE — 99213 OFFICE O/P EST LOW 20 MIN: CPT | Performed by: INTERNAL MEDICINE

## 2023-05-03 RX ORDER — TRAMADOL HYDROCHLORIDE 50 MG/1
50 TABLET ORAL 3 TIMES DAILY
Qty: 84 TABLET | Refills: 0 | Status: SHIPPED | OUTPATIENT
Start: 2023-05-03 | End: 2023-05-31

## 2023-05-03 RX ORDER — LANOLIN ALCOHOL/MO/W.PET/CERES
400 CREAM (GRAM) TOPICAL NIGHTLY
Qty: 30 TABLET | Refills: 1 | Status: SHIPPED | OUTPATIENT
Start: 2023-05-03

## 2023-05-03 RX ORDER — DICLOFENAC SODIUM 75 MG/1
75 TABLET, DELAYED RELEASE ORAL DAILY PRN
Qty: 30 TABLET | Refills: 1 | Status: SHIPPED | OUTPATIENT
Start: 2023-05-03

## 2023-05-03 RX ORDER — DULOXETIN HYDROCHLORIDE 30 MG/1
30 CAPSULE, DELAYED RELEASE ORAL DAILY
Qty: 30 CAPSULE | Refills: 0 | Status: SHIPPED | OUTPATIENT
Start: 2023-05-03

## 2023-05-03 RX ORDER — GABAPENTIN 600 MG/1
600 TABLET ORAL 3 TIMES DAILY
Qty: 90 TABLET | Refills: 1 | Status: SHIPPED | OUTPATIENT
Start: 2023-05-03 | End: 2023-06-02

## 2023-05-31 ENCOUNTER — OFFICE VISIT (OUTPATIENT)
Dept: PAIN MANAGEMENT | Age: 58
End: 2023-05-31
Payer: COMMERCIAL

## 2023-05-31 VITALS
OXYGEN SATURATION: 100 % | SYSTOLIC BLOOD PRESSURE: 137 MMHG | DIASTOLIC BLOOD PRESSURE: 62 MMHG | HEART RATE: 79 BPM | WEIGHT: 203 LBS | BODY MASS INDEX: 30.87 KG/M2

## 2023-05-31 DIAGNOSIS — M17.11 PRIMARY LOCALIZED OSTEOARTHROSIS OF RIGHT LOWER LEG: ICD-10-CM

## 2023-05-31 DIAGNOSIS — G89.4 CHRONIC PAIN SYNDROME: ICD-10-CM

## 2023-05-31 DIAGNOSIS — M79.7 FIBROMYALGIA: ICD-10-CM

## 2023-05-31 DIAGNOSIS — M54.16 LUMBAR RADICULOPATHY: ICD-10-CM

## 2023-05-31 DIAGNOSIS — T84.84XS PAIN IN PROSTHETIC JOINT, SEQUELA: ICD-10-CM

## 2023-05-31 PROCEDURE — 99213 OFFICE O/P EST LOW 20 MIN: CPT | Performed by: INTERNAL MEDICINE

## 2023-05-31 RX ORDER — DICLOFENAC SODIUM 75 MG/1
75 TABLET, DELAYED RELEASE ORAL DAILY PRN
Qty: 30 TABLET | Refills: 1 | Status: SHIPPED | OUTPATIENT
Start: 2023-05-31

## 2023-05-31 RX ORDER — TRAMADOL HYDROCHLORIDE 50 MG/1
50 TABLET ORAL 3 TIMES DAILY
Qty: 84 TABLET | Refills: 0 | Status: SHIPPED | OUTPATIENT
Start: 2023-05-31 | End: 2023-06-28

## 2023-05-31 RX ORDER — GABAPENTIN 600 MG/1
600 TABLET ORAL 3 TIMES DAILY
Qty: 90 TABLET | Refills: 1 | Status: SHIPPED | OUTPATIENT
Start: 2023-05-31 | End: 2023-06-30

## 2023-05-31 NOTE — PROGRESS NOTES
Collette [facility]  1965  [id number]      HISTORY OF PRESENT ILLNESS:  Ms. Sydney Stallworth is a 61 y.o. female returns for a follow up visit for pain management  She has a diagnosis of   1. Chronic pain syndrome    2. Primary localized osteoarthrosis of right lower leg    3. Primary insomnia    4. 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 Fibromyalgia    7. Mood disorder ([address]    . She complains of pain in the  Low back, left wrist, bilateral knees   She rates the pain 6/10 and describes it as aching, pins and needles. Current treatment regimen has helped relieve about 60% of the pain. She denies any side effects from the current pain regimen. Patient reports that since the last follow up visit the physical functioning is unchanged, family/social relationships are unchanged, mood is unchanged sleep patterns are unchanged, and that the overall functioning is unchanged. Patient denies misusing/abusing her narcotic pain medications or using any illegal drugs. There are No indicators for possible drug abuse, addiction or diversion problems. Patient states she has been doing fair, her pain has been somewhat manageable. Ms. Sydney Stallworth states she is using Ultram along with the other adjuvants. Patient reports she is working full time still and states she has a puppy now which keeps her busy. ALLERGIES: Patients list of allergies were reviewed     MEDICATIONS: Ms. Sydney Stallworth list of medications were reviewed. Her current medications are   Outpatient Medications Prior to Visit   Medication Sig Dispense Refill    traMADol (ULTRAM) 50 MG tablet Take 1 tablet by mouth 3 times daily for 28 days. 84 tablet 0    diclofenac (VOLTAREN) 75 MG EC tablet Take 1 tablet by mouth daily as needed for Pain 30 tablet 1    diclofenac sodium (VOLTAREN) 1 % GEL Apply 2-4 grams to affected area  g 3    gabapentin (NEURONTIN) 600 MG tablet Take 1 tablet by mouth 3 times daily for 30 days.  90 tablet 1    magnesium

## 2023-06-28 ENCOUNTER — OFFICE VISIT (OUTPATIENT)
Dept: PAIN MANAGEMENT | Age: 58
End: 2023-06-28
Payer: COMMERCIAL

## 2023-06-28 VITALS
BODY MASS INDEX: 30.41 KG/M2 | DIASTOLIC BLOOD PRESSURE: 88 MMHG | SYSTOLIC BLOOD PRESSURE: 137 MMHG | HEART RATE: 68 BPM | WEIGHT: 200 LBS | OXYGEN SATURATION: 94 %

## 2023-06-28 DIAGNOSIS — F39 MOOD DISORDER (HCC): ICD-10-CM

## 2023-06-28 DIAGNOSIS — M17.11 PRIMARY LOCALIZED OSTEOARTHROSIS OF RIGHT LOWER LEG: ICD-10-CM

## 2023-06-28 DIAGNOSIS — G89.4 CHRONIC PAIN SYNDROME: ICD-10-CM

## 2023-06-28 DIAGNOSIS — F51.01 PRIMARY INSOMNIA: ICD-10-CM

## 2023-06-28 DIAGNOSIS — T84.84XS PAIN IN PROSTHETIC JOINT, SEQUELA: ICD-10-CM

## 2023-06-28 DIAGNOSIS — M79.7 FIBROMYALGIA: ICD-10-CM

## 2023-06-28 DIAGNOSIS — M54.16 LUMBAR RADICULOPATHY: ICD-10-CM

## 2023-06-28 PROCEDURE — 99214 OFFICE O/P EST MOD 30 MIN: CPT | Performed by: INTERNAL MEDICINE

## 2023-06-28 RX ORDER — LANOLIN ALCOHOL/MO/W.PET/CERES
400 CREAM (GRAM) TOPICAL NIGHTLY
Qty: 30 TABLET | Refills: 1 | Status: SHIPPED | OUTPATIENT
Start: 2023-06-28

## 2023-06-28 RX ORDER — DICLOFENAC SODIUM 75 MG/1
75 TABLET, DELAYED RELEASE ORAL DAILY PRN
Qty: 30 TABLET | Refills: 1 | Status: SHIPPED | OUTPATIENT
Start: 2023-06-28

## 2023-06-28 RX ORDER — GABAPENTIN 600 MG/1
600 TABLET ORAL 3 TIMES DAILY
Qty: 90 TABLET | Refills: 1 | Status: SHIPPED | OUTPATIENT
Start: 2023-06-28 | End: 2023-08-27

## 2023-06-28 RX ORDER — DULOXETIN HYDROCHLORIDE 30 MG/1
30 CAPSULE, DELAYED RELEASE ORAL DAILY
Qty: 30 CAPSULE | Refills: 1 | Status: SHIPPED | OUTPATIENT
Start: 2023-06-28

## 2023-06-28 RX ORDER — TRAMADOL HYDROCHLORIDE 50 MG/1
50 TABLET ORAL 3 TIMES DAILY
Qty: 84 TABLET | Refills: 0 | Status: SHIPPED | OUTPATIENT
Start: 2023-06-28 | End: 2023-07-26

## 2023-07-24 ENCOUNTER — TELEPHONE (OUTPATIENT)
Dept: PRIMARY CARE CLINIC | Age: 58
End: 2023-07-24

## 2023-07-24 NOTE — TELEPHONE ENCOUNTER
Patient refused both colonoscopy and mammogram screenings. Explained to her the importance of these tests and how they can find early stage cancers. She still refused.  I have postponed both test.

## 2023-07-26 ENCOUNTER — OFFICE VISIT (OUTPATIENT)
Dept: PAIN MANAGEMENT | Age: 58
End: 2023-07-26
Payer: COMMERCIAL

## 2023-07-26 VITALS
SYSTOLIC BLOOD PRESSURE: 131 MMHG | HEART RATE: 69 BPM | DIASTOLIC BLOOD PRESSURE: 87 MMHG | BODY MASS INDEX: 30.56 KG/M2 | WEIGHT: 201 LBS

## 2023-07-26 DIAGNOSIS — M79.7 FIBROMYALGIA: ICD-10-CM

## 2023-07-26 DIAGNOSIS — T84.84XS PAIN IN PROSTHETIC JOINT, SEQUELA: ICD-10-CM

## 2023-07-26 DIAGNOSIS — G89.4 CHRONIC PAIN SYNDROME: ICD-10-CM

## 2023-07-26 DIAGNOSIS — M54.16 LUMBAR RADICULOPATHY: ICD-10-CM

## 2023-07-26 DIAGNOSIS — M17.11 PRIMARY LOCALIZED OSTEOARTHROSIS OF RIGHT LOWER LEG: ICD-10-CM

## 2023-07-26 PROCEDURE — 99213 OFFICE O/P EST LOW 20 MIN: CPT | Performed by: INTERNAL MEDICINE

## 2023-07-26 RX ORDER — GABAPENTIN 600 MG/1
600 TABLET ORAL 3 TIMES DAILY
Qty: 90 TABLET | Refills: 1 | Status: SHIPPED | OUTPATIENT
Start: 2023-07-26 | End: 2023-09-24

## 2023-07-26 RX ORDER — DICLOFENAC SODIUM 75 MG/1
75 TABLET, DELAYED RELEASE ORAL DAILY PRN
Qty: 30 TABLET | Refills: 1 | Status: SHIPPED | OUTPATIENT
Start: 2023-07-26

## 2023-07-26 RX ORDER — TRAMADOL HYDROCHLORIDE 50 MG/1
50 TABLET ORAL 3 TIMES DAILY
Qty: 84 TABLET | Refills: 0 | Status: SHIPPED | OUTPATIENT
Start: 2023-07-26 | End: 2023-08-23

## 2023-07-26 NOTE — PROGRESS NOTES
health care utilization and  decreased medication consumption. Will continue to monitor progress towards achieving/maintaining therapeutic goals with special emphasis on  1. Improvement in perceived interfernce  of pain with ADL's. Ability to do home exercises independently. Ability to do household chores indoor and/or outdoor work and social and leisure activities. Improve psychosocial and physical functioning. - she is showing progression towards this treatment goal with the current regimen. She was advised against drinking alcohol with the narcotic pain medicines, advised against driving or handling machinery while adjusting the dose of medicines or if having cognitive  issues related to the current medications. Risk of overdose and death, if medicines not taken as prescribed, were also discussed. If the patient develops new symptoms or if the symptoms worsen, the patient should call the office. While transcribing every attempt was made to maintain the accuracy of the note in terms of it's contents,there may have been some errors made inadvertently. Thank you for allowing me to participate in the care of this patient.     Eunice Hong MD.    Cc: Scott Guerrero MD

## 2023-08-14 ENCOUNTER — OFFICE VISIT (OUTPATIENT)
Dept: PRIMARY CARE CLINIC | Age: 58
End: 2023-08-14
Payer: COMMERCIAL

## 2023-08-14 VITALS
WEIGHT: 199 LBS | BODY MASS INDEX: 30.26 KG/M2 | RESPIRATION RATE: 18 BRPM | SYSTOLIC BLOOD PRESSURE: 141 MMHG | DIASTOLIC BLOOD PRESSURE: 89 MMHG | HEART RATE: 62 BPM

## 2023-08-14 DIAGNOSIS — R73.03 PREDIABETES: ICD-10-CM

## 2023-08-14 DIAGNOSIS — G89.4 CHRONIC PAIN SYNDROME: ICD-10-CM

## 2023-08-14 DIAGNOSIS — I10 ESSENTIAL HYPERTENSION: ICD-10-CM

## 2023-08-14 DIAGNOSIS — M79.7 FIBROMYALGIA: ICD-10-CM

## 2023-08-14 DIAGNOSIS — J45.20 MILD INTERMITTENT ASTHMA WITHOUT COMPLICATION: ICD-10-CM

## 2023-08-14 DIAGNOSIS — Z00.00 PREVENTATIVE HEALTH CARE: Primary | ICD-10-CM

## 2023-08-14 PROCEDURE — 99214 OFFICE O/P EST MOD 30 MIN: CPT | Performed by: FAMILY MEDICINE

## 2023-08-14 PROCEDURE — 3077F SYST BP >= 140 MM HG: CPT | Performed by: FAMILY MEDICINE

## 2023-08-14 PROCEDURE — 3079F DIAST BP 80-89 MM HG: CPT | Performed by: FAMILY MEDICINE

## 2023-08-14 RX ORDER — AMLODIPINE BESYLATE 5 MG/1
5 TABLET ORAL DAILY
Qty: 90 TABLET | Refills: 1 | Status: SHIPPED | OUTPATIENT
Start: 2023-08-14

## 2023-08-14 RX ORDER — ALBUTEROL SULFATE 90 UG/1
2 AEROSOL, METERED RESPIRATORY (INHALATION) EVERY 6 HOURS PRN
Qty: 1 EACH | Refills: 1 | Status: SHIPPED | OUTPATIENT
Start: 2023-08-14

## 2023-08-14 ASSESSMENT — ENCOUNTER SYMPTOMS
DIARRHEA: 0
ABDOMINAL PAIN: 0
BLOOD IN STOOL: 0
CONSTIPATION: 0
SHORTNESS OF BREATH: 0

## 2023-08-23 ENCOUNTER — OFFICE VISIT (OUTPATIENT)
Dept: PAIN MANAGEMENT | Age: 58
End: 2023-08-23
Payer: COMMERCIAL

## 2023-08-23 VITALS
BODY MASS INDEX: 30.56 KG/M2 | OXYGEN SATURATION: 99 % | HEART RATE: 76 BPM | SYSTOLIC BLOOD PRESSURE: 115 MMHG | WEIGHT: 201 LBS | DIASTOLIC BLOOD PRESSURE: 80 MMHG

## 2023-08-23 DIAGNOSIS — M17.11 PRIMARY LOCALIZED OSTEOARTHROSIS OF RIGHT LOWER LEG: ICD-10-CM

## 2023-08-23 DIAGNOSIS — M79.7 FIBROMYALGIA: ICD-10-CM

## 2023-08-23 DIAGNOSIS — G89.4 CHRONIC PAIN SYNDROME: ICD-10-CM

## 2023-08-23 DIAGNOSIS — M54.16 LUMBAR RADICULOPATHY: ICD-10-CM

## 2023-08-23 DIAGNOSIS — T84.84XS PAIN IN PROSTHETIC JOINT, SEQUELA: ICD-10-CM

## 2023-08-23 DIAGNOSIS — Z79.899 ENCOUNTER FOR LONG-TERM (CURRENT) USE OF HIGH-RISK MEDICATION: ICD-10-CM

## 2023-08-23 PROCEDURE — 99213 OFFICE O/P EST LOW 20 MIN: CPT | Performed by: INTERNAL MEDICINE

## 2023-08-23 RX ORDER — GABAPENTIN 600 MG/1
600 TABLET ORAL 3 TIMES DAILY
Qty: 90 TABLET | Refills: 1 | Status: SHIPPED | OUTPATIENT
Start: 2023-08-23 | End: 2023-10-22

## 2023-08-23 RX ORDER — NALOXONE HYDROCHLORIDE 4 MG/.1ML
1 SPRAY NASAL PRN
Qty: 1 EACH | Refills: 0 | Status: SHIPPED | OUTPATIENT
Start: 2023-08-23

## 2023-08-23 RX ORDER — DICLOFENAC SODIUM 75 MG/1
75 TABLET, DELAYED RELEASE ORAL DAILY PRN
Qty: 30 TABLET | Refills: 1 | Status: SHIPPED | OUTPATIENT
Start: 2023-08-23

## 2023-08-23 RX ORDER — TRAMADOL HYDROCHLORIDE 50 MG/1
50 TABLET ORAL 3 TIMES DAILY
Qty: 84 TABLET | Refills: 0 | Status: SHIPPED | OUTPATIENT
Start: 2023-08-23 | End: 2023-09-20

## 2023-08-25 NOTE — PROGRESS NOTES
MG tablet Take 1 tablet by mouth daily 90 tablet 1    magnesium oxide (MGO) 400 (240 Mg) MG tablet Take 1 tablet by mouth nightly 30 tablet 1    DULoxetine (CYMBALTA) 30 MG extended release capsule Take 1 capsule by mouth daily 30 capsule 1    Fluticasone Propionate (FLONASE NA) by Nasal route       melatonin 3 MG TABS tablet Take 1 tablet by mouth nightly       No current facility-administered medications for this visit. General Goals of current treatment regimen include improvement in pain, restoration of functioning- with focus on improvement in physical performance, general activity, work or disability,emotional distress, health care utilization and  decreased medication consumption. Will continue to monitor progress towards achieving/maintaining therapeutic goals with special emphasis on  1. Improvement in perceived interfernce  of pain with ADL's. Ability to do home exercises independently. Ability to do household chores indoor and/or outdoor work and social and leisure activities. Improve psychosocial and physical functioning. - she is maintaining her treatment goal with the current regimen. She was advised against drinking alcohol with the narcotic pain medicines, advised against driving or handling machinery while adjusting the dose of medicines or if having cognitive  issues related to the current medications. Risk of overdose and death, if medicines not taken as prescribed, were also discussed. If the patient develops new symptoms or if the symptoms worsen, the patient should call the office. While transcribing every attempt was made to maintain the accuracy of the note in terms of it's contents,there may have been some errors made inadvertently. Thank you for allowing me to participate in the care of this patient.     Marva Schneider MD.    Cc: Alireza Howard MD

## 2023-09-20 ENCOUNTER — OFFICE VISIT (OUTPATIENT)
Dept: PAIN MANAGEMENT | Age: 58
End: 2023-09-20
Payer: COMMERCIAL

## 2023-09-20 VITALS
SYSTOLIC BLOOD PRESSURE: 138 MMHG | OXYGEN SATURATION: 99 % | WEIGHT: 199 LBS | HEART RATE: 68 BPM | DIASTOLIC BLOOD PRESSURE: 83 MMHG | BODY MASS INDEX: 30.26 KG/M2

## 2023-09-20 DIAGNOSIS — M54.16 LUMBAR RADICULOPATHY: ICD-10-CM

## 2023-09-20 DIAGNOSIS — M79.7 FIBROMYALGIA: ICD-10-CM

## 2023-09-20 DIAGNOSIS — T84.84XS PAIN IN PROSTHETIC JOINT, SEQUELA: ICD-10-CM

## 2023-09-20 DIAGNOSIS — M17.11 PRIMARY LOCALIZED OSTEOARTHROSIS OF RIGHT LOWER LEG: ICD-10-CM

## 2023-09-20 DIAGNOSIS — Z79.899 ENCOUNTER FOR LONG-TERM (CURRENT) USE OF HIGH-RISK MEDICATION: ICD-10-CM

## 2023-09-20 DIAGNOSIS — G89.4 CHRONIC PAIN SYNDROME: ICD-10-CM

## 2023-09-20 PROCEDURE — 99213 OFFICE O/P EST LOW 20 MIN: CPT | Performed by: INTERNAL MEDICINE

## 2023-09-20 RX ORDER — GABAPENTIN 600 MG/1
600 TABLET ORAL 3 TIMES DAILY
Qty: 90 TABLET | Refills: 1 | Status: SHIPPED | OUTPATIENT
Start: 2023-09-20 | End: 2023-11-19

## 2023-09-20 RX ORDER — TRAMADOL HYDROCHLORIDE 50 MG/1
50 TABLET ORAL 3 TIMES DAILY
Qty: 84 TABLET | Refills: 0 | Status: SHIPPED | OUTPATIENT
Start: 2023-09-20 | End: 2023-10-18

## 2023-09-20 RX ORDER — DICLOFENAC SODIUM 75 MG/1
75 TABLET, DELAYED RELEASE ORAL DAILY PRN
Qty: 30 TABLET | Refills: 1 | Status: SHIPPED | OUTPATIENT
Start: 2023-09-20

## 2023-09-20 NOTE — PROGRESS NOTES
disability,emotional distress, health care utilization and  decreased medication consumption. Will continue to monitor progress towards achieving/maintaining therapeutic goals with special emphasis on  1. Improvement in perceived interfernce  of pain with ADL's. Ability to do home exercises independently. Ability to do household chores indoor and/or outdoor work and social and leisure activities. Improve psychosocial and physical functioning. - she is maintaining her treatment goal with the current regimen. 2. Ability to focus/concentrate at work and perform the duties required of her at work  Sit through a workday without lower extremity symptoms. Stand 30-60 minutes without lower extremity symptoms. Ability to lift up to 10-20 lbs. Ability to go up and down stairs. Sit 30-60 minutes  Without having to stand up frequently. - she is maintaining/progressing towards her work related goals with the current regimen. She was advised against drinking alcohol with the narcotic pain medicines, advised against driving or handling machinery while adjusting the dose of medicines or if having cognitive  issues related to the current medications. Risk of overdose and death, if medicines not taken as prescribed, were also discussed. If the patient develops new symptoms or if the symptoms worsen, the patient should call the office. While transcribing every attempt was made to maintain the accuracy of the note in terms of it's contents,there may have been some errors made inadvertently. Thank you for allowing me to participate in the care of this patient.     Darin West MD.    Cc: Rock Skaggs MD

## 2023-10-18 ENCOUNTER — OFFICE VISIT (OUTPATIENT)
Dept: PAIN MANAGEMENT | Age: 58
End: 2023-10-18
Payer: COMMERCIAL

## 2023-10-18 VITALS
SYSTOLIC BLOOD PRESSURE: 122 MMHG | HEART RATE: 83 BPM | BODY MASS INDEX: 30.87 KG/M2 | DIASTOLIC BLOOD PRESSURE: 79 MMHG | WEIGHT: 203 LBS

## 2023-10-18 DIAGNOSIS — Z79.899 ENCOUNTER FOR LONG-TERM (CURRENT) USE OF HIGH-RISK MEDICATION: ICD-10-CM

## 2023-10-18 DIAGNOSIS — M17.11 PRIMARY LOCALIZED OSTEOARTHROSIS OF RIGHT LOWER LEG: ICD-10-CM

## 2023-10-18 DIAGNOSIS — M79.7 FIBROMYALGIA: ICD-10-CM

## 2023-10-18 DIAGNOSIS — M54.16 LUMBAR RADICULOPATHY: ICD-10-CM

## 2023-10-18 DIAGNOSIS — G89.4 CHRONIC PAIN SYNDROME: ICD-10-CM

## 2023-10-18 DIAGNOSIS — T84.84XS PAIN IN PROSTHETIC JOINT, SEQUELA: ICD-10-CM

## 2023-10-18 PROCEDURE — 99213 OFFICE O/P EST LOW 20 MIN: CPT | Performed by: INTERNAL MEDICINE

## 2023-10-18 RX ORDER — TRAMADOL HYDROCHLORIDE 50 MG/1
50 TABLET ORAL 3 TIMES DAILY
Qty: 84 TABLET | Refills: 0 | Status: SHIPPED | OUTPATIENT
Start: 2023-10-18 | End: 2023-11-15

## 2023-10-18 RX ORDER — GABAPENTIN 600 MG/1
600 TABLET ORAL 3 TIMES DAILY
Qty: 90 TABLET | Refills: 1 | Status: SHIPPED | OUTPATIENT
Start: 2023-10-18 | End: 2023-12-17

## 2023-10-18 RX ORDER — DICLOFENAC SODIUM 75 MG/1
75 TABLET, DELAYED RELEASE ORAL DAILY PRN
Qty: 30 TABLET | Refills: 1 | Status: SHIPPED | OUTPATIENT
Start: 2023-10-18

## 2023-10-18 RX ORDER — LANOLIN ALCOHOL/MO/W.PET/CERES
400 CREAM (GRAM) TOPICAL NIGHTLY
Qty: 30 TABLET | Refills: 1 | Status: SHIPPED | OUTPATIENT
Start: 2023-10-18

## 2023-10-18 NOTE — PROGRESS NOTES
4. 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 Encounter for long-term (current) use of high-risk medication        PLAN:  Informed verbal consent was obtained. Risks and benefits of the medications and other alternative treatments  including no treatment have been discussed with the patient. Any questions related to these were addressed. The common side effects of these medications were also explained to the patient. -ROM/Stretching exercises   -Continue with Ultram 3 per day   -Continue with Neurontin, can increase it to 100 mg   -Walking/Swimming exercises as advised    Current Outpatient Medications   Medication Sig Dispense Refill    traMADol (ULTRAM) 50 MG tablet Take 1 tablet by mouth 3 times daily for 28 days. 84 tablet 0    gabapentin (NEURONTIN) 600 MG tablet Take 1 tablet by mouth 3 times daily for 60 days. 90 tablet 1    diclofenac (VOLTAREN) 75 MG EC tablet Take 1 tablet by mouth daily as needed for Pain 30 tablet 1    magnesium oxide (MGO) 400 (240 Mg) MG tablet Take 1 tablet by mouth nightly 30 tablet 1    naloxone 4 MG/0.1ML LIQD nasal spray 1 spray by Nasal route as needed for Opioid Reversal 1 each 0    albuterol sulfate HFA (VENTOLIN HFA) 108 (90 Base) MCG/ACT inhaler Inhale 2 puffs into the lungs every 6 hours as needed for Wheezing 1 each 1    amLODIPine (NORVASC) 5 MG tablet Take 1 tablet by mouth daily 90 tablet 1    Fluticasone Propionate (FLONASE NA) by Nasal route       melatonin 3 MG TABS tablet Take 1 tablet by mouth nightly       No current facility-administered medications for this visit. General Goals of current treatment regimen include improvement in pain, restoration of functioning- with focus on improvement in physical performance, general activity, work or disability,emotional distress, health care utilization and  decreased medication consumption.    Will continue to monitor progress towards achieving/maintaining therapeutic goals with special emphasis

## 2023-11-15 ENCOUNTER — OFFICE VISIT (OUTPATIENT)
Dept: PAIN MANAGEMENT | Age: 58
End: 2023-11-15
Payer: COMMERCIAL

## 2023-11-15 VITALS
BODY MASS INDEX: 31.63 KG/M2 | OXYGEN SATURATION: 97 % | SYSTOLIC BLOOD PRESSURE: 130 MMHG | WEIGHT: 208 LBS | DIASTOLIC BLOOD PRESSURE: 90 MMHG | HEART RATE: 72 BPM

## 2023-11-15 DIAGNOSIS — M17.11 PRIMARY LOCALIZED OSTEOARTHROSIS OF RIGHT LOWER LEG: ICD-10-CM

## 2023-11-15 DIAGNOSIS — M79.7 FIBROMYALGIA: ICD-10-CM

## 2023-11-15 DIAGNOSIS — G89.4 CHRONIC PAIN SYNDROME: ICD-10-CM

## 2023-11-15 DIAGNOSIS — T84.84XS PAIN IN PROSTHETIC JOINT, SEQUELA: ICD-10-CM

## 2023-11-15 DIAGNOSIS — M54.16 LUMBAR RADICULOPATHY: ICD-10-CM

## 2023-11-15 PROCEDURE — 99213 OFFICE O/P EST LOW 20 MIN: CPT | Performed by: INTERNAL MEDICINE

## 2023-11-15 RX ORDER — TRAMADOL HYDROCHLORIDE 50 MG/1
50 TABLET ORAL 3 TIMES DAILY
Qty: 105 TABLET | Refills: 0 | Status: SHIPPED | OUTPATIENT
Start: 2023-11-15 | End: 2023-12-20

## 2023-11-15 RX ORDER — LANOLIN ALCOHOL/MO/W.PET/CERES
400 CREAM (GRAM) TOPICAL NIGHTLY
Qty: 30 TABLET | Refills: 1 | Status: SHIPPED | OUTPATIENT
Start: 2023-11-15

## 2023-11-15 RX ORDER — DICLOFENAC SODIUM 75 MG/1
75 TABLET, DELAYED RELEASE ORAL DAILY PRN
Qty: 30 TABLET | Refills: 1 | Status: SHIPPED | OUTPATIENT
Start: 2023-11-15

## 2023-11-15 RX ORDER — LIDOCAINE 50 MG/G
1 PATCH TOPICAL DAILY
Qty: 60 PATCH | Refills: 0 | Status: SHIPPED | OUTPATIENT
Start: 2023-11-15 | End: 2024-01-14

## 2023-11-15 RX ORDER — GABAPENTIN 600 MG/1
600 TABLET ORAL 2 TIMES DAILY
Qty: 60 TABLET | Refills: 1 | Status: SHIPPED | OUTPATIENT
Start: 2023-11-15 | End: 2024-01-14

## 2023-11-15 NOTE — PROGRESS NOTES
[facility]  1965  [id number]    HISTORY OF PRESENT ILLNESS:  Ms. Mae Mata is a 60 y.o. female returns for a follow up visit for multiple medical problems. Her  presenting problems are   1. Chronic pain syndrome    2. Fibromyalgia    3. Primary insomnia    4. 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 Mood disorder ([address])    7. Primary localized osteoarthrosis of right lower leg    . As per information/history obtained from the PADT(patient assessment and documentation tool) -  She complains of pain in the  generalize pain all over   She rates the pain 7/10 and describes it as sharp, aching, burning. Pain is made worse by: movement, walking, standing, sitting, bending. Current treatment regimen has helped relieve about 70% of the pain. She denies side effects from the current pain regimen. Patient reports that since last follow up visit the physical functioning is unchanged, family/social relationships are unchanged, mood is unchanged sleep patterns are unchanged. Ms. Mae Mata states that since starting the treatment with the current regimen the  overall functioning  in the above aspects is  better,Patient denies neurological bowel or bladder. Patient denies misusing/abusing her narcotic pain medications or using any illegal drugs. There are No indicators for possible drug abuse, addiction or diversion problems. Upon obtaining the medical history from Ms. Mae Mata regarding today's office visit for her presenting problems, patient states she has been having pain in the right knee. Ms. Mae Mata states she is using Ultram 3 per day along with Voltaren PRN only, she denies any side effects. She mentions she is using Neurontin 600 mg BID only. ALLERGIES: Patients list of allergies were reviewed     MEDICATIONS: Ms. Mae Mata list of medications were reviewed. Her current medications are   Outpatient Medications Prior to Visit   Medication Sig Dispense Refill    traMADol Queta Hard)

## 2024-01-09 ENCOUNTER — TELEPHONE (OUTPATIENT)
Dept: PRIMARY CARE CLINIC | Age: 59
End: 2024-01-09

## 2024-01-09 DIAGNOSIS — J45.20 MILD INTERMITTENT ASTHMA WITHOUT COMPLICATION: ICD-10-CM

## 2024-01-09 DIAGNOSIS — Z00.00 PREVENTATIVE HEALTH CARE: Primary | ICD-10-CM

## 2024-01-09 RX ORDER — ALBUTEROL SULFATE 90 UG/1
2 AEROSOL, METERED RESPIRATORY (INHALATION) EVERY 6 HOURS PRN
Qty: 1 EACH | Refills: 1 | Status: SHIPPED | OUTPATIENT
Start: 2024-01-09

## 2024-01-09 NOTE — TELEPHONE ENCOUNTER
----- Message from Laure Lucas sent at 1/9/2024 12:05 PM EST -----  Subject: Message to Provider    QUESTIONS  Information for Provider? Pt is asking to verify that labs are still done   at the office and if she can walk in and get her blood drawn   ---------------------------------------------------------------------------  --------------  CALL BACK INFO  [phone]; Do not leave any message, patient will call back for answer,OK   to respond with electronic message via [employer] portal (only for patients   who have registered [employer] account)  ---------------------------------------------------------------------------  --------------  SCRIPT ANSWERS  Relationship to Patient? Self

## 2024-01-09 NOTE — TELEPHONE ENCOUNTER
Called and D/W pt.        She will get labs done  this week.  And make a f/u appt with Dr Gonzáles for next week. Reviewed labs from Dr Espitia-   we are ordering the same things.

## 2024-01-11 DIAGNOSIS — Z00.00 PREVENTATIVE HEALTH CARE: ICD-10-CM

## 2024-01-11 DIAGNOSIS — G89.4 CHRONIC PAIN SYNDROME: ICD-10-CM

## 2024-01-11 LAB
ALBUMIN SERPL-MCNC: 4.5 G/DL (ref 3.4–5)
ALBUMIN/GLOB SERPL: 1.6 {RATIO} (ref 1.1–2.2)
ALP SERPL-CCNC: 90 U/L (ref 40–129)
ALT SERPL-CCNC: 23 U/L (ref 10–40)
ANION GAP SERPL CALCULATED.3IONS-SCNC: 13 MMOL/L (ref 3–16)
AST SERPL-CCNC: 21 U/L (ref 15–37)
BASOPHILS # BLD: 0 K/UL (ref 0–0.2)
BASOPHILS NFR BLD: 0.6 %
BILIRUB SERPL-MCNC: <0.2 MG/DL (ref 0–1)
BUN SERPL-MCNC: 18 MG/DL (ref 7–20)
CALCIUM SERPL-MCNC: 9 MG/DL (ref 8.3–10.6)
CHLORIDE SERPL-SCNC: 105 MMOL/L (ref 99–110)
CHOLEST SERPL-MCNC: 212 MG/DL (ref 0–199)
CO2 SERPL-SCNC: 25 MMOL/L (ref 21–32)
CREAT SERPL-MCNC: 1 MG/DL (ref 0.6–1.1)
DEPRECATED RDW RBC AUTO: 14.6 % (ref 12.4–15.4)
EOSINOPHIL # BLD: 0.2 K/UL (ref 0–0.6)
EOSINOPHIL NFR BLD: 3.5 %
GFR SERPLBLD CREATININE-BSD FMLA CKD-EPI: >60 ML/MIN/{1.73_M2}
GLUCOSE P FAST SERPL-MCNC: 98 MG/DL (ref 70–99)
GLUCOSE SERPL-MCNC: 98 MG/DL (ref 70–99)
HCT VFR BLD AUTO: 40.4 % (ref 36–48)
HDLC SERPL-MCNC: 79 MG/DL (ref 40–60)
HGB BLD-MCNC: 13 G/DL (ref 12–16)
LDL CHOLESTEROL CALCULATED: 97 MG/DL
LYMPHOCYTES NFR BLD: 37 %
MCH RBC QN AUTO: 27 PG (ref 26–34)
MCHC RBC AUTO-ENTMCNC: 32.2 G/DL (ref 31–36)
MCV RBC AUTO: 84 FL (ref 80–100)
MONOCYTES # BLD: 0.5 K/UL (ref 0–1.3)
MONOCYTES NFR BLD: 7.1 %
NEUTROPHILS # BLD: 3.4 K/UL (ref 1.7–7.7)
NEUTROPHILS NFR BLD: 51.8 %
PLATELET # BLD AUTO: 346 K/UL (ref 135–450)
PMV BLD AUTO: 8.9 FL (ref 5–10.5)
POTASSIUM SERPL-SCNC: 4.8 MMOL/L (ref 3.5–5.1)
PROT SERPL-MCNC: 7.4 G/DL (ref 6.4–8.2)
RBC # BLD AUTO: 4.8 M/UL (ref 4–5.2)
SODIUM SERPL-SCNC: 143 MMOL/L (ref 136–145)
T4 FREE SERPL-MCNC: 1.2 NG/DL (ref 0.9–1.8)
TRIGL SERPL-MCNC: 182 MG/DL (ref 0–150)
TSH SERPL DL<=0.005 MIU/L-ACNC: 0.96 UIU/ML (ref 0.27–4.2)
VLDLC SERPL CALC-MCNC: 36 MG/DL
WBC # BLD AUTO: 6.6 K/UL (ref 4–11)

## 2024-01-12 LAB
EST. AVERAGE GLUCOSE BLD GHB EST-MCNC: 128.4 MG/DL
HBA1C MFR BLD: 6.1 %

## 2024-01-17 ENCOUNTER — OFFICE VISIT (OUTPATIENT)
Dept: PAIN MANAGEMENT | Age: 59
End: 2024-01-17
Payer: COMMERCIAL

## 2024-01-17 VITALS
SYSTOLIC BLOOD PRESSURE: 135 MMHG | HEART RATE: 58 BPM | BODY MASS INDEX: 31.93 KG/M2 | DIASTOLIC BLOOD PRESSURE: 75 MMHG | WEIGHT: 210 LBS | OXYGEN SATURATION: 99 %

## 2024-01-17 DIAGNOSIS — M79.7 FIBROMYALGIA: ICD-10-CM

## 2024-01-17 DIAGNOSIS — T84.84XS PAIN IN PROSTHETIC JOINT, SEQUELA: ICD-10-CM

## 2024-01-17 DIAGNOSIS — M54.16 LUMBAR RADICULOPATHY: ICD-10-CM

## 2024-01-17 DIAGNOSIS — G89.4 CHRONIC PAIN SYNDROME: ICD-10-CM

## 2024-01-17 DIAGNOSIS — M17.11 PRIMARY LOCALIZED OSTEOARTHROSIS OF RIGHT LOWER LEG: ICD-10-CM

## 2024-01-17 PROCEDURE — 99213 OFFICE O/P EST LOW 20 MIN: CPT | Performed by: INTERNAL MEDICINE

## 2024-01-17 RX ORDER — TRAMADOL HYDROCHLORIDE 50 MG/1
50 TABLET ORAL 3 TIMES DAILY
Qty: 84 TABLET | Refills: 0 | Status: SHIPPED | OUTPATIENT
Start: 2024-01-17 | End: 2024-02-16

## 2024-01-17 RX ORDER — LANOLIN ALCOHOL/MO/W.PET/CERES
400 CREAM (GRAM) TOPICAL NIGHTLY
Qty: 30 TABLET | Refills: 1 | Status: SHIPPED | OUTPATIENT
Start: 2024-01-17

## 2024-01-17 RX ORDER — GABAPENTIN 600 MG/1
600 TABLET ORAL 2 TIMES DAILY
Qty: 60 TABLET | Refills: 1 | Status: SHIPPED | OUTPATIENT
Start: 2024-01-17 | End: 2024-03-17

## 2024-01-17 RX ORDER — DICLOFENAC SODIUM 75 MG/1
75 TABLET, DELAYED RELEASE ORAL DAILY PRN
Qty: 30 TABLET | Refills: 1 | Status: SHIPPED | OUTPATIENT
Start: 2024-01-17

## 2024-01-19 NOTE — PROGRESS NOTES
Neutral/Euthymic(normal) .    IMPRESSION:   1. Chronic pain syndrome    2. Fibromyalgia    3. 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 Primary localized osteoarthrosis of right lower leg        PLAN:  Informed verbal consent was obtained.  Risks and benefits of the medications and other alternative treatments  including no treatment have been discussed with the patient. Any questions related to these were addressed. The common side effects of these medications were also explained to the patient.    -ROM/Stretching exercises as advised   -Continue with Ultram 3 per day   -She was advised to increase fluids ( 5-7  glasses of fluid daily), limit caffeine, avoid cheese products, increase dietary fiber, increase activity and exercise as tolerated and relax regularly and enjoy meals   -Continue with Voltaren along with other adjuvants    Current Outpatient Medications   Medication Sig Dispense Refill    diclofenac (VOLTAREN) 75 MG EC tablet Take 1 tablet by mouth daily as needed for Pain 30 tablet 1    gabapentin (NEURONTIN) 600 MG tablet Take 1 tablet by mouth 2 times daily for 60 days. 60 tablet 1    magnesium oxide (MGO) 400 (240 Mg) MG tablet Take 1 tablet by mouth nightly 30 tablet 1    traMADol (ULTRAM) 50 MG tablet Take 1 tablet by mouth 3 times daily for 30 days. Max Daily Amount: 150 mg 84 tablet 0    albuterol sulfate HFA (VENTOLIN HFA) 108 (90 Base) MCG/ACT inhaler Inhale 2 puffs into the lungs every 6 hours as needed for Wheezing 1 each 1    naloxone 4 MG/0.1ML LIQD nasal spray 1 spray by Nasal route as needed for Opioid Reversal 1 each 0    amLODIPine (NORVASC) 5 MG tablet Take 1 tablet by mouth daily 90 tablet 1    Fluticasone Propionate (FLONASE NA) by Nasal route       melatonin 3 MG TABS tablet Take 1 tablet by mouth nightly       No current facility-administered medications for this visit.       General Goals of current treatment regimen include improvement in pain, restoration

## 2024-02-14 ENCOUNTER — OFFICE VISIT (OUTPATIENT)
Dept: PAIN MANAGEMENT | Age: 59
End: 2024-02-14
Payer: COMMERCIAL

## 2024-02-14 VITALS
HEART RATE: 69 BPM | WEIGHT: 210 LBS | DIASTOLIC BLOOD PRESSURE: 83 MMHG | BODY MASS INDEX: 31.93 KG/M2 | SYSTOLIC BLOOD PRESSURE: 136 MMHG | OXYGEN SATURATION: 97 %

## 2024-02-14 DIAGNOSIS — M79.7 FIBROMYALGIA: ICD-10-CM

## 2024-02-14 DIAGNOSIS — T84.84XS PAIN IN PROSTHETIC JOINT, SEQUELA: ICD-10-CM

## 2024-02-14 DIAGNOSIS — G89.4 CHRONIC PAIN SYNDROME: ICD-10-CM

## 2024-02-14 DIAGNOSIS — M54.16 LUMBAR RADICULOPATHY: ICD-10-CM

## 2024-02-14 DIAGNOSIS — M17.11 PRIMARY LOCALIZED OSTEOARTHROSIS OF RIGHT LOWER LEG: ICD-10-CM

## 2024-02-14 PROCEDURE — 99213 OFFICE O/P EST LOW 20 MIN: CPT | Performed by: INTERNAL MEDICINE

## 2024-02-14 RX ORDER — LANOLIN ALCOHOL/MO/W.PET/CERES
400 CREAM (GRAM) TOPICAL NIGHTLY
Qty: 30 TABLET | Refills: 1 | Status: SHIPPED | OUTPATIENT
Start: 2024-02-14

## 2024-02-14 RX ORDER — TRAMADOL HYDROCHLORIDE 50 MG/1
50 TABLET ORAL 3 TIMES DAILY
Qty: 105 TABLET | Refills: 0 | Status: SHIPPED | OUTPATIENT
Start: 2024-02-14 | End: 2024-03-20

## 2024-02-14 NOTE — PROGRESS NOTES
Dixie Lazcano  1965  [id number]      HISTORY OF PRESENT ILLNESS:  Ms. Lazcano is a 58 y.o. female returns for a follow up visit for pain management  She has a diagnosis of   1. Chronic pain syndrome    2. Fibromyalgia    3. Lumbar radiculopathy    4. Primary localized osteoarthrosis of right lower leg    5. Primary insomnia    6. Mood disorder (HCC)    7. 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      As per information/history obtained from the PADT(patient assessment and documentation tool) -  She complains of pain in the  generalize pain all over  She rates the pain 7/10 and describes it as sharp, aching, burning, numbness, pins and needles.  Pain is made worse by: walking, standing, sitting, bending. She denies any side effects from the current pain regimen. Patient reports that since last follow up visit the physical functioning is worse, family/social relationships are unchanged, mood is unchanged sleep patterns are worse. Ms. Lazcano states that since starting the treatment with the current regimen the  overall functioning  in the above aspects is  better, Patient denies misusing/abusing her narcotic pain medications or using any illegal drugs.  There are No indicators for possible drug abuse, addiction or diversion problems. Upon obtaining the medical history from Ms. Lazcano regarding today's office visit for her presenting problems, patient states she has been doing fair. Ms. Lazcano states she is working full time, she is working 40+ hours a week.Patient denies any constipation symptoms. She states she is using Ultram 3 per day along with Neurontin, she denies any side effects.       ALLERGIES: Patients list of allergies were reviewed     MEDICATIONS: Ms. Lazcano list of medications were reviewed.Her current medications are   Outpatient Medications Prior to Visit   Medication Sig Dispense Refill    diclofenac (VOLTAREN) 75 MG EC tablet Take 1 tablet by mouth daily as needed for Pain 30 tablet 1

## 2024-03-11 ENCOUNTER — TELEPHONE (OUTPATIENT)
Dept: ORTHOPEDIC SURGERY | Age: 59
End: 2024-03-11

## 2024-03-11 NOTE — TELEPHONE ENCOUNTER
Dr. Adarsh Munson (dentist office called) they need to know if patient needs to be pre medicated before her dental appt?  Patient use to be a Dr. Goodwin patient she had a knee replacement in 2015.      Barrett at the doctors office asked if you can fax over the info a Yes or No on patient needing medication.  And also how many years does she need to continue medication before dental appts?    Please fax to [phone]          Or call Barrett [phone], Thanks!

## 2024-03-15 ENCOUNTER — OFFICE VISIT (OUTPATIENT)
Dept: PRIMARY CARE CLINIC | Age: 59
End: 2024-03-15
Payer: COMMERCIAL

## 2024-03-15 VITALS
SYSTOLIC BLOOD PRESSURE: 122 MMHG | HEART RATE: 70 BPM | DIASTOLIC BLOOD PRESSURE: 79 MMHG | BODY MASS INDEX: 31.17 KG/M2 | RESPIRATION RATE: 18 BRPM | OXYGEN SATURATION: 97 % | WEIGHT: 205 LBS

## 2024-03-15 DIAGNOSIS — F39 MOOD DISORDER (HCC): ICD-10-CM

## 2024-03-15 DIAGNOSIS — J45.20 MILD INTERMITTENT ASTHMA WITHOUT COMPLICATION: Primary | ICD-10-CM

## 2024-03-15 DIAGNOSIS — M79.7 FIBROMYALGIA: ICD-10-CM

## 2024-03-15 DIAGNOSIS — R03.0 ELEVATED BP WITHOUT DIAGNOSIS OF HYPERTENSION: ICD-10-CM

## 2024-03-15 DIAGNOSIS — G89.4 CHRONIC PAIN SYNDROME: ICD-10-CM

## 2024-03-15 DIAGNOSIS — R73.03 PREDIABETES: ICD-10-CM

## 2024-03-15 PROCEDURE — 99214 OFFICE O/P EST MOD 30 MIN: CPT | Performed by: FAMILY MEDICINE

## 2024-03-15 RX ORDER — DULOXETIN HYDROCHLORIDE 60 MG/1
60 CAPSULE, DELAYED RELEASE ORAL DAILY
Qty: 90 CAPSULE | Refills: 1 | Status: SHIPPED | OUTPATIENT
Start: 2024-03-15

## 2024-03-15 RX ORDER — ALBUTEROL SULFATE 90 UG/1
2 AEROSOL, METERED RESPIRATORY (INHALATION) EVERY 6 HOURS PRN
Qty: 54 G | Refills: 1 | Status: SHIPPED | OUTPATIENT
Start: 2024-03-15

## 2024-03-15 ASSESSMENT — PATIENT HEALTH QUESTIONNAIRE - PHQ9
SUM OF ALL RESPONSES TO PHQ QUESTIONS 1-9: 2
SUM OF ALL RESPONSES TO PHQ QUESTIONS 1-9: 2
1. LITTLE INTEREST OR PLEASURE IN DOING THINGS: 1
2. FEELING DOWN, DEPRESSED OR HOPELESS: 1
SUM OF ALL RESPONSES TO PHQ QUESTIONS 1-9: 2
SUM OF ALL RESPONSES TO PHQ9 QUESTIONS 1 & 2: 2
SUM OF ALL RESPONSES TO PHQ QUESTIONS 1-9: 2

## 2024-03-15 ASSESSMENT — ENCOUNTER SYMPTOMS
DIARRHEA: 0
SHORTNESS OF BREATH: 0
BLOOD IN STOOL: 0
CONSTIPATION: 0
ABDOMINAL PAIN: 0

## 2024-03-15 NOTE — PROGRESS NOTES
3/15/2024     Dixie Lazcano (:  1965) is a 58 y.o. female, here for evaluation of the following medical concerns:    HPI  Patient is 58 years old female medical history significant for asthma, hypertension, mood disorder, fibromyalgia, chronic pain syndrome, and prediabetes presented to the office for follow-up and refill.  She requested refill of albuterol inhaler, reported that he cannot afford the Symbicort due to high co-pay.  She reported that she is stressed out taking care of her dog who controlling her apartment and needs needs to be in obedience school.  She was on Cymbalta in the past but she quit.  She has a chronic pain syndrome and takes tramadol and diclofenac..    Review of Systems   Constitutional:  Negative for activity change and appetite change.   Eyes:  Negative for visual disturbance.   Respiratory:  Negative for shortness of breath.    Cardiovascular:  Negative for chest pain and leg swelling.   Gastrointestinal:  Negative for abdominal pain, blood in stool, constipation and diarrhea.   Genitourinary:  Negative for difficulty urinating, frequency, hematuria, menstrual problem and urgency.   Neurological:  Negative for dizziness and syncope.   Psychiatric/Behavioral:  Negative for behavioral problems.        Prior to Visit Medications    Medication Sig Taking? Authorizing Provider   albuterol sulfate HFA (VENTOLIN HFA) 108 (90 Base) MCG/ACT inhaler Inhale 2 puffs into the lungs every 6 hours as needed for Wheezing Yes Sergey Gonzáles MD   DULoxetine (CYMBALTA) 60 MG extended release capsule Take 1 capsule by mouth daily Yes Sergey Gonzáles MD   magnesium oxide (MGO) 400 (240 Mg) MG tablet Take 1 tablet by mouth nightly  Dennis Espitia MD   traMADol (ULTRAM) 50 MG tablet Take 1 tablet by mouth 3 times daily for 35 days. Max Daily Amount: 150 mg  Dennis Espitia MD   diclofenac (VOLTAREN) 75 MG EC tablet Take 1 tablet by mouth daily as needed for Pain  Dennis Espitia MD

## 2024-03-15 NOTE — PATIENT INSTRUCTIONS
GENERAL OFFICE POLICIES        Telephone Calls: Messages will be answered within 1-2 business days, unless the provider is out of the office.  If it is urgent a covering provider will answer. (this does not include Medication refills).      MyChart:  We recommend all patients sign up for [employer].  Through this portal you can see your lab results, request refills, schedule appointments, pay your bill and send messages to the office.   [employer] messages will be answered within 1-2 business days unless the provider is out of the office.  For urgent matters, please call the office.    Appointments:  All appointments must be scheduled.  We ask all patients to schedule their next follow up appointment before they leave the office to make sure you will be able to be seen before you run out of medications.  24 hours' notice is required to cancel or reschedule an appointment to avoid being marked as a no show.  You may be dismissed from the practice after 3 no shows.      LATE for Appointment: If you are 15 or more minutes late for your appointment, you may be asked to reschedule.    MA/LAB APPTS: Must be scheduled, cannot accept walk in lab visits.  We only draw labs for patients established in our office.  We only do injections for medications ordered by our office.    Acute Sick Visits:  Nothing other than acute complaint will be addressed at this visit.    TRADITIONAL MEDICARE DOES NOT COVER PHYSICALS  MEDICARE WELLNESS VISITS: These are NOT physicals, but the free annual visit offered by Medicare to discuss wellness issues. Medication refills, checkups, etc. will not be addressed during this visit.    Medication Refills: Refills are handled electronically; please contact your pharmacy for refills even if current refills have been exhausted. If you are on a controlled medication, you will be referred to a specialist (pain specialist, psychiatry, etc).     Forms: There is a $35 fee to fill out FMLA/Disability paperwork,

## 2024-03-20 ENCOUNTER — OFFICE VISIT (OUTPATIENT)
Dept: PAIN MANAGEMENT | Age: 59
End: 2024-03-20
Payer: COMMERCIAL

## 2024-03-20 VITALS
DIASTOLIC BLOOD PRESSURE: 76 MMHG | OXYGEN SATURATION: 94 % | HEART RATE: 72 BPM | WEIGHT: 207 LBS | BODY MASS INDEX: 31.47 KG/M2 | SYSTOLIC BLOOD PRESSURE: 124 MMHG

## 2024-03-20 DIAGNOSIS — M17.11 PRIMARY LOCALIZED OSTEOARTHROSIS OF RIGHT LOWER LEG: ICD-10-CM

## 2024-03-20 DIAGNOSIS — F39 MOOD DISORDER (HCC): ICD-10-CM

## 2024-03-20 DIAGNOSIS — M79.7 FIBROMYALGIA: ICD-10-CM

## 2024-03-20 DIAGNOSIS — M54.16 LUMBAR RADICULOPATHY: ICD-10-CM

## 2024-03-20 DIAGNOSIS — G89.4 CHRONIC PAIN SYNDROME: ICD-10-CM

## 2024-03-20 DIAGNOSIS — F51.01 PRIMARY INSOMNIA: ICD-10-CM

## 2024-03-20 DIAGNOSIS — T84.84XS PAIN IN PROSTHETIC JOINT, SEQUELA: ICD-10-CM

## 2024-03-20 PROCEDURE — 99213 OFFICE O/P EST LOW 20 MIN: CPT | Performed by: INTERNAL MEDICINE

## 2024-03-20 RX ORDER — DICLOFENAC SODIUM 75 MG/1
75 TABLET, DELAYED RELEASE ORAL DAILY PRN
Qty: 30 TABLET | Refills: 1 | Status: SHIPPED | OUTPATIENT
Start: 2024-03-20

## 2024-03-20 RX ORDER — GABAPENTIN 600 MG/1
600 TABLET ORAL 2 TIMES DAILY
Qty: 60 TABLET | Refills: 1 | Status: SHIPPED | OUTPATIENT
Start: 2024-03-20 | End: 2024-05-19

## 2024-03-20 RX ORDER — LANOLIN ALCOHOL/MO/W.PET/CERES
400 CREAM (GRAM) TOPICAL NIGHTLY
Qty: 30 TABLET | Refills: 1 | Status: SHIPPED | OUTPATIENT
Start: 2024-03-20

## 2024-03-20 RX ORDER — TRAMADOL HYDROCHLORIDE 50 MG/1
50 TABLET ORAL 3 TIMES DAILY
Qty: 84 TABLET | Refills: 0 | Status: SHIPPED | OUTPATIENT
Start: 2024-03-20 | End: 2024-04-17

## 2024-03-20 NOTE — PROGRESS NOTES
Dixie Lazcano  1965  [id number]    HISTORY OF PRESENT ILLNESS:  Ms. Lazcano is a 58 y.o. female returns for a follow up visit for multiple medical problems.  Her  presenting problems are   1. Chronic pain syndrome    2. Fibromyalgia    3. Primary insomnia    4. 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 Primary localized osteoarthrosis of right lower leg    7. Mood disorder (HCC)    .    As per information/history obtained from the PADT(patient assessment and documentation tool) -  She complains of pain in the  generalize pain all over  She rates the pain 10/10 and describes it as sharp, aching, burning, numbness, pins and needles.  Pain is made worse by: walking, standing, sitting, bending. She denies any side effects from the current pain regimen. Patient reports that since last follow up visit the physical functioning is worse, family/social relationships are unchanged, mood is unchanged sleep patterns are worse. Ms. Lazcano states that since starting the treatment with the current regimen the  overall functioning  in the above aspects is  better, Patient denies misusing/abusing her narcotic pain medications or using any illegal drugs.  There are No indicators for possible drug abuse, addiction or diversion problems, Upon obtaining the medical history from Ms. Lazcano regarding today's office visit for her presenting problems, patient states she has been doing fair, she is managing with the medications. Ms. Lazcano reports she is working full time. She states she is using Neurontin along with Voltaren. Patient states she is having a fibromyalgia flare up. She states she saw her PCP recently, she was given Cymbalta 60 mg but not started it yet.         ALLERGIES: Patients list of allergies were reviewed     MEDICATIONS: Ms. Lazcano list of medications were reviewed.Her current medications are   Outpatient Medications Prior to Visit   Medication Sig Dispense Refill    albuterol sulfate

## 2024-04-17 ENCOUNTER — OFFICE VISIT (OUTPATIENT)
Dept: PAIN MANAGEMENT | Age: 59
End: 2024-04-17
Payer: COMMERCIAL

## 2024-04-17 VITALS
SYSTOLIC BLOOD PRESSURE: 137 MMHG | HEART RATE: 60 BPM | BODY MASS INDEX: 31.78 KG/M2 | WEIGHT: 209 LBS | DIASTOLIC BLOOD PRESSURE: 80 MMHG | OXYGEN SATURATION: 96 %

## 2024-04-17 DIAGNOSIS — G89.4 CHRONIC PAIN SYNDROME: ICD-10-CM

## 2024-04-17 DIAGNOSIS — M79.7 FIBROMYALGIA: ICD-10-CM

## 2024-04-17 DIAGNOSIS — M54.16 LUMBAR RADICULOPATHY: ICD-10-CM

## 2024-04-17 DIAGNOSIS — T84.84XS PAIN IN PROSTHETIC JOINT, SEQUELA: ICD-10-CM

## 2024-04-17 DIAGNOSIS — M17.11 PRIMARY LOCALIZED OSTEOARTHROSIS OF RIGHT LOWER LEG: ICD-10-CM

## 2024-04-17 PROCEDURE — 99213 OFFICE O/P EST LOW 20 MIN: CPT | Performed by: INTERNAL MEDICINE

## 2024-04-17 RX ORDER — TRAMADOL HYDROCHLORIDE 50 MG/1
50 TABLET ORAL 3 TIMES DAILY
Qty: 84 TABLET | Refills: 0 | Status: SHIPPED | OUTPATIENT
Start: 2024-04-17 | End: 2024-05-15

## 2024-04-17 RX ORDER — DICLOFENAC SODIUM 75 MG/1
75 TABLET, DELAYED RELEASE ORAL DAILY PRN
Qty: 30 TABLET | Refills: 1 | Status: SHIPPED | OUTPATIENT
Start: 2024-04-17

## 2024-04-17 RX ORDER — LANOLIN ALCOHOL/MO/W.PET/CERES
400 CREAM (GRAM) TOPICAL NIGHTLY
Qty: 30 TABLET | Refills: 1 | Status: SHIPPED | OUTPATIENT
Start: 2024-04-17

## 2024-04-17 NOTE — PROGRESS NOTES
Dixie Lazcano  1965  [id number]      HISTORY OF PRESENT ILLNESS:  Ms. Lazcano is a 58 y.o. female returns for a follow up visit for pain management  She has a diagnosis of   1. Chronic pain syndrome    2. Lumbar radiculopathy    3. Mood disorder (HCC)    4. 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 Primary insomnia    7. Primary localized osteoarthrosis of right lower leg    .      As per information/history obtained from the PADT(patient assessment and documentation tool) -  She complains of pain in the  generalize pain all over   She rates the pain 5/10 and describes it as sharp, aching, burning.  Pain is made worse by: movement, walking, standing, sitting, bending, lifting. She denies any side effects from the current pain regimen. Patient reports that since last follow up visit the physical functioning is worse, family/social relationships are unchanged, mood is unchanged sleep patterns are worse. Ms. Lazcano states that since starting the treatment with the current regimen the  overall functioning  in the above aspects is  better, Patient denies misusing/abusing her narcotic pain medications or using any illegal drugs.  There are No indicators for possible drug abuse, addiction or diversion problems. Upon obtaining the medical history from Ms. Lazcano regarding today's office visit for her presenting problems, patient states she is having increase pain, she is hurting more. Ms. Lazcano states her left knee is hurting more for 2-3 days. Patient reports she is working full time. She mentions she is using Neurontin along with Ultram 3 per day. Patient reports her weight has been stable.       ALLERGIES: Patients list of allergies were reviewed     MEDICATIONS: Ms. Lazcano list of medications were reviewed.Her current medications are   Outpatient Medications Prior to Visit   Medication Sig Dispense Refill    diclofenac (VOLTAREN) 75 MG EC tablet Take 1 tablet by mouth daily as needed for

## 2024-05-13 ENCOUNTER — TELEPHONE (OUTPATIENT)
Dept: PRIMARY CARE CLINIC | Age: 59
End: 2024-05-13

## 2024-05-13 DIAGNOSIS — J45.20 MILD INTERMITTENT ASTHMA WITHOUT COMPLICATION: Primary | ICD-10-CM

## 2024-05-13 RX ORDER — NEBULIZER ACCESSORIES
1 KIT MISCELLANEOUS EVERY 6 HOURS PRN
Qty: 1 KIT | Refills: 0 | Status: SHIPPED | OUTPATIENT
Start: 2024-05-13

## 2024-05-13 RX ORDER — ALBUTEROL SULFATE 2.5 MG/3ML
2.5 SOLUTION RESPIRATORY (INHALATION) EVERY 6 HOURS PRN
Qty: 120 EACH | Refills: 3 | Status: SHIPPED | OUTPATIENT
Start: 2024-05-13

## 2024-05-13 NOTE — TELEPHONE ENCOUNTER
Patient states that she is having difficulty with her breathing due to her asthma and was requesting that prescription for an nebulizer be sent over to her pharmacy. Pt did not want to make an appt.      [id number] - [place], [place] - [address] - P [phone] - F [phone]   [address] [facility] [id number]       Last visit 3/15/24

## 2024-05-13 NOTE — TELEPHONE ENCOUNTER
Refill request sent to Dr Gonzáles.        Spoke with pt.  Asked that she check with [facility] to see if they have the nebulizer machine or if her insurance prefers to use them for the neb meds.

## 2024-05-15 ENCOUNTER — OFFICE VISIT (OUTPATIENT)
Dept: PAIN MANAGEMENT | Age: 59
End: 2024-05-15
Payer: COMMERCIAL

## 2024-05-15 VITALS
WEIGHT: 209 LBS | DIASTOLIC BLOOD PRESSURE: 77 MMHG | SYSTOLIC BLOOD PRESSURE: 122 MMHG | OXYGEN SATURATION: 96 % | BODY MASS INDEX: 31.78 KG/M2 | HEART RATE: 83 BPM

## 2024-05-15 DIAGNOSIS — M54.16 LUMBAR RADICULOPATHY: ICD-10-CM

## 2024-05-15 DIAGNOSIS — G89.4 CHRONIC PAIN SYNDROME: ICD-10-CM

## 2024-05-15 DIAGNOSIS — T84.84XS PAIN IN PROSTHETIC JOINT, SEQUELA: ICD-10-CM

## 2024-05-15 DIAGNOSIS — M17.11 PRIMARY LOCALIZED OSTEOARTHROSIS OF RIGHT LOWER LEG: ICD-10-CM

## 2024-05-15 DIAGNOSIS — M79.7 FIBROMYALGIA: ICD-10-CM

## 2024-05-15 DIAGNOSIS — Z51.81 ENCOUNTER FOR THERAPEUTIC DRUG MONITORING: ICD-10-CM

## 2024-05-15 PROCEDURE — 99213 OFFICE O/P EST LOW 20 MIN: CPT | Performed by: INTERNAL MEDICINE

## 2024-05-15 RX ORDER — DICLOFENAC SODIUM 75 MG/1
75 TABLET, DELAYED RELEASE ORAL DAILY PRN
Qty: 30 TABLET | Refills: 1 | Status: SHIPPED | OUTPATIENT
Start: 2024-05-15

## 2024-05-15 RX ORDER — TRAMADOL HYDROCHLORIDE 50 MG/1
50 TABLET ORAL EVERY 6 HOURS PRN
Qty: 105 TABLET | Refills: 0 | Status: SHIPPED | OUTPATIENT
Start: 2024-05-15 | End: 2024-06-19

## 2024-05-15 RX ORDER — GABAPENTIN 600 MG/1
600 TABLET ORAL 2 TIMES DAILY
Qty: 60 TABLET | Refills: 1 | Status: SHIPPED | OUTPATIENT
Start: 2024-05-15 | End: 2024-07-14

## 2024-05-15 RX ORDER — LANOLIN ALCOHOL/MO/W.PET/CERES
400 CREAM (GRAM) TOPICAL NIGHTLY
Qty: 30 TABLET | Refills: 1 | Status: SHIPPED | OUTPATIENT
Start: 2024-05-15

## 2024-05-15 NOTE — PROGRESS NOTES
Dixie Lazcano  1965  [id number]      HISTORY OF PRESENT ILLNESS:  Ms. Lazcano is a 58 y.o. female returns for a follow up visit for pain management  She has a diagnosis of   1. Encounter for therapeutic drug monitoring    2. 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 Mood disorder (HCC)    5. Lumbar radiculopathy    6. Primary localized osteoarthrosis of right lower leg    7. Fibromyalgia    8. Primary insomnia    .      As per information/history obtained from the PADT(patient assessment and documentation tool) -  She complains of pain in the  generalize pain all over  She rates the pain 5/10 and describes it as sharp, aching, burning.  Pain is made worse by: movement, walking, standing, sitting, bending. She denies any side effects from the current pain regimen. Patient reports that since last follow up visit the physical functioning is worse, family/social relationships are unchanged, mood is worse sleep patterns are worse. Ms. Lazcano states that since starting the treatment with the current regimen the  overall functioning  in the above aspects is  better, Patient denies misusing/abusing her narcotic pain medications or using any illegal drugs.  There are No indicators for possible drug abuse, addiction or diversion problems. Upon obtaining the medical history from Ms. Lazcano regarding today's office visit for her presenting problems, patient states she is having increase pain, She complains of increased pain with changes in weather. Extreme temperatures- rain, cold and damp weather causes increased pain.  Ms. Lazcano reports she is working full time still but is getting hard to continue working. Patient complains of her legs hurting a lot. She mentions she is using Neurontin along with Ultram. Patient denies any constipation symptoms. She says she is using a heating pad and ice.       ALLERGIES: Patients list of allergies were reviewed     MEDICATIONS: Ms. Lazcano list of

## 2024-05-31 ENCOUNTER — TELEPHONE (OUTPATIENT)
Dept: PRIMARY CARE CLINIC | Age: 59
End: 2024-05-31

## 2024-05-31 DIAGNOSIS — J45.20 MILD INTERMITTENT ASTHMA WITHOUT COMPLICATION: ICD-10-CM

## 2024-05-31 RX ORDER — NEBULIZER ACCESSORIES
1 KIT MISCELLANEOUS EVERY 6 HOURS PRN
Qty: 1 KIT | Refills: 0 | Status: SHIPPED | OUTPATIENT
Start: 2024-05-31

## 2024-05-31 RX ORDER — ALBUTEROL SULFATE 2.5 MG/3ML
2.5 SOLUTION RESPIRATORY (INHALATION) EVERY 6 HOURS PRN
Qty: 120 EACH | Refills: 3 | Status: SHIPPED | OUTPATIENT
Start: 2024-05-31

## 2024-05-31 NOTE — TELEPHONE ENCOUNTER
Rx's printed.      Will have Dr Gonzáles sign them, then put them in an envelope @ .       Called and informed pt.

## 2024-05-31 NOTE — TELEPHONE ENCOUNTER
PT called in regarding her request for a nebulizer kit and solution that she asked for back on 5/13.     [facility] in [place] does not carry those items and PT is asking for a paper prescription that she can  today to take to [facility] in [place].     PT says that she is still having issues breathing & is relying on her inhaler more.     Please print paper script off today & call PT so she can come pick this up.     Best call back number: [phone]

## 2024-06-07 ENCOUNTER — OFFICE VISIT (OUTPATIENT)
Dept: PRIMARY CARE CLINIC | Age: 59
End: 2024-06-07
Payer: COMMERCIAL

## 2024-06-07 VITALS
BODY MASS INDEX: 31.93 KG/M2 | DIASTOLIC BLOOD PRESSURE: 87 MMHG | HEART RATE: 61 BPM | RESPIRATION RATE: 18 BRPM | WEIGHT: 210 LBS | SYSTOLIC BLOOD PRESSURE: 151 MMHG

## 2024-06-07 DIAGNOSIS — J45.20 MILD INTERMITTENT ASTHMA WITHOUT COMPLICATION: ICD-10-CM

## 2024-06-07 DIAGNOSIS — R03.0 ELEVATED BP WITHOUT DIAGNOSIS OF HYPERTENSION: ICD-10-CM

## 2024-06-07 DIAGNOSIS — M54.50 CHRONIC RIGHT-SIDED LOW BACK PAIN WITHOUT SCIATICA: Primary | ICD-10-CM

## 2024-06-07 DIAGNOSIS — F39 MOOD DISORDER (HCC): ICD-10-CM

## 2024-06-07 DIAGNOSIS — G89.29 CHRONIC RIGHT-SIDED LOW BACK PAIN WITHOUT SCIATICA: Primary | ICD-10-CM

## 2024-06-07 DIAGNOSIS — G89.4 CHRONIC PAIN SYNDROME: ICD-10-CM

## 2024-06-07 DIAGNOSIS — M79.7 FIBROMYALGIA: ICD-10-CM

## 2024-06-07 PROCEDURE — 99214 OFFICE O/P EST MOD 30 MIN: CPT | Performed by: FAMILY MEDICINE

## 2024-06-07 RX ORDER — PREDNISONE 20 MG/1
20 TABLET ORAL DAILY
Qty: 10 TABLET | Refills: 0 | Status: SHIPPED | OUTPATIENT
Start: 2024-06-07 | End: 2024-06-17

## 2024-06-07 RX ORDER — TIZANIDINE HYDROCHLORIDE 2 MG/1
2 CAPSULE, GELATIN COATED ORAL 3 TIMES DAILY PRN
Qty: 30 CAPSULE | Refills: 1 | Status: SHIPPED | OUTPATIENT
Start: 2024-06-07

## 2024-06-07 ASSESSMENT — ENCOUNTER SYMPTOMS
SHORTNESS OF BREATH: 0
CONSTIPATION: 0
BLOOD IN STOOL: 0
ABDOMINAL PAIN: 0
DIARRHEA: 0
BACK PAIN: 1

## 2024-06-07 NOTE — PROGRESS NOTES
2024     Dixie Lazcano (:  1965) is a 58 y.o. female, here for evaluation of the following medical concerns:    Back Pain  Pertinent negatives include no abdominal pain or chest pain.   Fatigue  Associated symptoms include fatigue. Pertinent negatives include no abdominal pain or chest pain.   Generalized Body Aches  Associated symptoms include fatigue. Pertinent negatives include no abdominal pain or chest pain.     Patient is 58 years old female medical history significant for chronic back pain, fibromyalgia, mood disorder and asthma.  She presented to the office for follow-up..  She reported that back pain is acting up once on the right side and may be related to working at [employer].  Also complains of general aches and pains as well as chronic fatigue and occasional shortness of breath.  She is wondering if she should apply for permanent disability.  She stated that she cannot live like this for tomorrow.  She goes to pain management and takes diclofenac, gabapentin and tramadol.  She has anxiety and takes Cymbalta 60 mg daily.  She has asthma and takes albuterol inhaler or nebulizer she complain of the high cost of the maintenance medicine Symbicort.  She denies fever and chills denies wheezing    Review of Systems   Constitutional:  Positive for fatigue. Negative for activity change and appetite change.   Eyes:  Negative for visual disturbance.   Respiratory:  Negative for shortness of breath.    Cardiovascular:  Negative for chest pain and leg swelling.   Gastrointestinal:  Negative for abdominal pain, blood in stool, constipation and diarrhea.   Genitourinary:  Negative for difficulty urinating, frequency, hematuria, menstrual problem and urgency.   Musculoskeletal:  Positive for back pain.   Neurological:  Negative for dizziness and syncope.   Psychiatric/Behavioral:  Negative for behavioral problems.        Prior to Visit Medications    Medication Sig Taking? Authorizing Provider

## 2024-06-07 NOTE — PATIENT INSTRUCTIONS
GENERAL OFFICE POLICIES        Telephone Calls: Messages will be answered within 1-2 business days, unless the provider is out of the office.  If it is urgent a covering provider will answer. (this does not include Medication refills).      MyChart:  We recommend all patients sign up for [employer] [facility] [employer].  Through this portal you can see your lab results, request refills, schedule appointments, pay your bill and send messages to the office.   [employer] messages will be answered within 1-2 business days unless the provider is out of the office.  For urgent matters, please call the office.    Appointments:  All appointments must be scheduled.  We ask all patients to schedule their next follow up appointment before they leave the office to make sure you will be able to be seen before you run out of medications.  24 hours' notice is required to cancel or reschedule an appointment to avoid being marked as a no show.  You may be dismissed from the practice after 3 no shows.      LATE for Appointment: If you are 15 or more minutes late for your appointment, you may be asked to reschedule.    MA/LAB APPTS: Must be scheduled, cannot accept walk in lab visits.  We only draw labs for patients established in our office.  We only do injections for medications ordered by our office.    Acute Sick Visits:  Nothing other than acute complaint will be addressed at this visit.    TRADITIONAL MEDICARE DOES NOT COVER PHYSICALS  MEDICARE WELLNESS VISITS: These are NOT physicals, but the free annual visit offered by Medicare to discuss wellness issues. Medication refills, checkups, etc. will not be addressed during this visit.    Medication Refills: Refills are handled electronically; please contact your pharmacy for refills even if current refills have been exhausted. If you are on a controlled medication, you will be referred to a specialist (pain specialist, psychiatry, etc).     Forms: There is a $35 fee to fill out FMLA/Disability paperwork,

## 2024-06-19 ENCOUNTER — OFFICE VISIT (OUTPATIENT)
Dept: PAIN MANAGEMENT | Age: 59
End: 2024-06-19
Payer: COMMERCIAL

## 2024-06-19 VITALS
HEART RATE: 87 BPM | SYSTOLIC BLOOD PRESSURE: 128 MMHG | OXYGEN SATURATION: 95 % | BODY MASS INDEX: 31.63 KG/M2 | DIASTOLIC BLOOD PRESSURE: 77 MMHG | WEIGHT: 208 LBS

## 2024-06-19 DIAGNOSIS — M17.11 PRIMARY LOCALIZED OSTEOARTHROSIS OF RIGHT LOWER LEG: ICD-10-CM

## 2024-06-19 DIAGNOSIS — T84.84XS PAIN IN PROSTHETIC JOINT, SEQUELA: ICD-10-CM

## 2024-06-19 DIAGNOSIS — M54.16 LUMBAR RADICULOPATHY: ICD-10-CM

## 2024-06-19 DIAGNOSIS — G89.4 CHRONIC PAIN SYNDROME: ICD-10-CM

## 2024-06-19 DIAGNOSIS — M79.7 FIBROMYALGIA: ICD-10-CM

## 2024-06-19 PROCEDURE — 99213 OFFICE O/P EST LOW 20 MIN: CPT | Performed by: INTERNAL MEDICINE

## 2024-06-19 RX ORDER — DICLOFENAC SODIUM 75 MG/1
75 TABLET, DELAYED RELEASE ORAL DAILY PRN
Qty: 30 TABLET | Refills: 1 | Status: SHIPPED | OUTPATIENT
Start: 2024-06-19

## 2024-06-19 RX ORDER — TRAMADOL HYDROCHLORIDE 50 MG/1
50 TABLET ORAL 3 TIMES DAILY PRN
Qty: 84 TABLET | Refills: 0 | Status: SHIPPED | OUTPATIENT
Start: 2024-06-19 | End: 2024-07-17

## 2024-06-19 NOTE — PROGRESS NOTES
capsule Take 1 capsule by mouth 3 times daily as needed for Muscle spasms May cause drowsiness. 30 capsule 1    mometasone-formoterol (DULERA) 200-5 MCG/ACT inhaler Inhale 2 puffs into the lungs 2 times daily 1 each 3    albuterol (PROVENTIL) (2.5 MG/3ML) 0.083% nebulizer solution Take 3 mLs by nebulization every 6 hours as needed for Wheezing 120 each 3    Respiratory Therapy Supplies (NEBULIZER/TUBING/MOUTHPIECE) KIT 1 kit by Does not apply route every 6 hours as needed (shortness of breath/ wheezing) 1 kit 0    diclofenac (VOLTAREN) 75 MG EC tablet Take 1 tablet by mouth daily as needed for Pain 30 tablet 1    gabapentin (NEURONTIN) 600 MG tablet Take 1 tablet by mouth 2 times daily for 60 days. 60 tablet 1    magnesium oxide (MGO) 400 (240 Mg) MG tablet Take 1 tablet by mouth nightly 30 tablet 1    traMADol (ULTRAM) 50 MG tablet Take 1 tablet by mouth every 6 hours as needed for Pain for up to 35 days. 105 tablet 0    albuterol sulfate HFA (VENTOLIN HFA) 108 (90 Base) MCG/ACT inhaler Inhale 2 puffs into the lungs every 6 hours as needed for Wheezing 54 g 1    DULoxetine (CYMBALTA) 60 MG extended release capsule Take 1 capsule by mouth daily 90 capsule 1    naloxone 4 MG/0.1ML LIQD nasal spray 1 spray by Nasal route as needed for Opioid Reversal 1 each 0    amLODIPine (NORVASC) 5 MG tablet Take 1 tablet by mouth daily 90 tablet 1    Fluticasone Propionate (FLONASE NA) by Nasal route       melatonin 3 MG TABS tablet Take 1 tablet by mouth nightly       No facility-administered medications prior to visit.        REVIEW OF SYSTEMS:    Respiratory: Negative for apnea, chest tightness and shortness of breath or change in baseline breathing.      PHYSICAL EXAM:   Nursing note and vitals reviewed. /77   Pulse 87   Wt 94.3 kg (208 lb)   SpO2 95%   BMI 31.63 kg/m²   Constitutional: She appears well-developed and well-nourished. No acute distress.   Cardiovascular: Normal rate, regular rhythm, normal heart

## 2024-07-17 ENCOUNTER — OFFICE VISIT (OUTPATIENT)
Dept: PAIN MANAGEMENT | Age: 59
End: 2024-07-17
Payer: COMMERCIAL

## 2024-07-17 VITALS
OXYGEN SATURATION: 97 % | DIASTOLIC BLOOD PRESSURE: 88 MMHG | SYSTOLIC BLOOD PRESSURE: 159 MMHG | BODY MASS INDEX: 31.93 KG/M2 | HEART RATE: 52 BPM | WEIGHT: 210 LBS

## 2024-07-17 DIAGNOSIS — G89.4 CHRONIC PAIN SYNDROME: ICD-10-CM

## 2024-07-17 DIAGNOSIS — Z51.81 ENCOUNTER FOR THERAPEUTIC DRUG MONITORING: ICD-10-CM

## 2024-07-17 DIAGNOSIS — M79.7 FIBROMYALGIA: ICD-10-CM

## 2024-07-17 DIAGNOSIS — T84.84XS PAIN IN PROSTHETIC JOINT, SEQUELA: ICD-10-CM

## 2024-07-17 DIAGNOSIS — M54.16 LUMBAR RADICULOPATHY: ICD-10-CM

## 2024-07-17 DIAGNOSIS — M17.11 PRIMARY LOCALIZED OSTEOARTHROSIS OF RIGHT LOWER LEG: ICD-10-CM

## 2024-07-17 PROCEDURE — 99213 OFFICE O/P EST LOW 20 MIN: CPT | Performed by: INTERNAL MEDICINE

## 2024-07-17 RX ORDER — GABAPENTIN 600 MG/1
600 TABLET ORAL 2 TIMES DAILY
Qty: 60 TABLET | Refills: 1 | Status: SHIPPED | OUTPATIENT
Start: 2024-07-17 | End: 2024-09-15

## 2024-07-17 RX ORDER — DICLOFENAC SODIUM 75 MG/1
75 TABLET, DELAYED RELEASE ORAL DAILY PRN
Qty: 30 TABLET | Refills: 1 | Status: SHIPPED | OUTPATIENT
Start: 2024-07-17

## 2024-07-17 RX ORDER — TRAMADOL HYDROCHLORIDE 50 MG/1
50 TABLET ORAL 3 TIMES DAILY PRN
Qty: 84 TABLET | Refills: 0 | Status: SHIPPED | OUTPATIENT
Start: 2024-07-17 | End: 2024-08-14

## 2024-07-17 NOTE — PROGRESS NOTES
Dixie Lazcano  1965  [id number]      HISTORY OF PRESENT ILLNESS:  Ms. Lazcano is a 58 y.o. female returns for a follow up visit for pain management  She has a diagnosis of   1. Chronic pain syndrome    2. Fibromyalgia    3. Primary insomnia    4. Primary localized osteoarthrosis of right lower leg    5. Lumbar radiculopathy    6. Mood disorder (HCC)    7. 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 Encounter for therapeutic drug monitoring    .      As per information/history obtained from the PADT(patient assessment and documentation tool) -  She complains of pain in the  generalize pain all over  She rates the pain 6/10 and describes it as sharp, aching, burning, numbness.  Pain is made worse by: movement, walking, standing, sitting, bending, lifting. She denies any side effects from the current pain regimen. Patient reports that since last follow up visit the physical functioning is worse, family/social relationships are unchanged, mood is unchanged sleep patterns are worse. Ms. Lazcano states that since starting the treatment with the current regimen the  overall functioning  in the above aspects is  better, Patient denies misusing/abusing her narcotic pain medications or using any illegal drugs.  There are No indicators for possible drug abuse, addiction or diversion problems. Upon obtaining the medical history from Ms. Lazcano regarding today's office visit for her presenting problems, patient states she has been doing fair. 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 Ms. Lazcano mentions she is using Ultram along with Neurontin. Patient denies any constipation symptoms. She states she had a spasm around a week ago, she states her backed locked up and having increased pain since. Patient states she feels Ultram is not helping as well overall.     ALLERGIES: Patients list of allergies were reviewed     MEDICATIONS: Ms. Lazcano list of medications were reviewed.Her current medications are

## 2024-08-14 ENCOUNTER — OFFICE VISIT (OUTPATIENT)
Dept: PAIN MANAGEMENT | Age: 59
End: 2024-08-14
Payer: COMMERCIAL

## 2024-08-14 VITALS
BODY MASS INDEX: 31.47 KG/M2 | WEIGHT: 207 LBS | SYSTOLIC BLOOD PRESSURE: 133 MMHG | DIASTOLIC BLOOD PRESSURE: 77 MMHG | HEART RATE: 53 BPM | OXYGEN SATURATION: 96 %

## 2024-08-14 DIAGNOSIS — F51.01 PRIMARY INSOMNIA: ICD-10-CM

## 2024-08-14 DIAGNOSIS — T84.84XS PAIN IN PROSTHETIC JOINT, SEQUELA: ICD-10-CM

## 2024-08-14 DIAGNOSIS — M54.16 LUMBAR RADICULOPATHY: ICD-10-CM

## 2024-08-14 DIAGNOSIS — M79.7 FIBROMYALGIA: ICD-10-CM

## 2024-08-14 DIAGNOSIS — G89.4 CHRONIC PAIN SYNDROME: ICD-10-CM

## 2024-08-14 DIAGNOSIS — M17.11 PRIMARY LOCALIZED OSTEOARTHROSIS OF RIGHT LOWER LEG: ICD-10-CM

## 2024-08-14 DIAGNOSIS — F39 MOOD DISORDER (HCC): ICD-10-CM

## 2024-08-14 PROCEDURE — 99214 OFFICE O/P EST MOD 30 MIN: CPT | Performed by: INTERNAL MEDICINE

## 2024-08-14 RX ORDER — DICLOFENAC SODIUM 75 MG/1
75 TABLET, DELAYED RELEASE ORAL DAILY PRN
Qty: 30 TABLET | Refills: 1 | Status: SHIPPED | OUTPATIENT
Start: 2024-08-14

## 2024-08-14 RX ORDER — GABAPENTIN 600 MG/1
600 TABLET ORAL 2 TIMES DAILY
Qty: 60 TABLET | Refills: 1 | Status: SHIPPED | OUTPATIENT
Start: 2024-08-14 | End: 2024-10-13

## 2024-08-14 RX ORDER — HYDROCODONE BITARTRATE AND ACETAMINOPHEN 5; 325 MG/1; MG/1
1 TABLET ORAL 3 TIMES DAILY PRN
Qty: 84 TABLET | Refills: 0 | Status: SHIPPED | OUTPATIENT
Start: 2024-08-14 | End: 2024-09-11

## 2024-08-14 NOTE — PROGRESS NOTES
the dose when possible.she is showing progression towards this treatment goal with the current regimen.    4. Ability to focus/concentrate at work and perform the duties required of her at work  Sit through a workday without lower extremity symptoms.  Stand 30-60 minutes without lower extremity symptoms.  Ability to lift up to 10-20 lbs. Ability to go up and down stairs.  Sit 30-60 minutes  Without having to stand up frequently. - she is maintaining/progressing towards her work related goals with the current regimen.     Risks and benefits of the medications and other alternative treatments have been/were  discussed with the patient. Any questions on the  common side effects of these medications were also answered.  She was advised against drinking alcohol with the narcotic pain medicines, advised against driving or handling machinery when  starting or adjusting the dose of medicines, feeling groggy or drowsy, or if having any cognitive issues related to the current medications. Sheis fully aware of the risk of overdose and death, if medicines are misused and not taken as prescribed. If she develops new symptoms or if the symptoms worsen, she was told to call the office. .  Thank you for allowing me to participate in the care of this patient.    Dennis Espitia MD    Cc: Sergey Heard MD

## 2024-08-19 ENCOUNTER — TELEPHONE (OUTPATIENT)
Dept: PAIN MANAGEMENT | Age: 59
End: 2024-08-19

## 2024-08-19 NOTE — TELEPHONE ENCOUNTER
Pt called saying that she needs to be put back on her tramadal and pt also stated that the norco isn't working and that she is in a lot of pain. Pt is also asking what she should do with the remaining pain meds?

## 2024-08-19 NOTE — TELEPHONE ENCOUNTER
Called patient to advise her that per [facility] she can return the Hydrocodone and we can send in a Rx for the Tramadol. I advised patient we would be at our [place] office tomorrow. Patient states that she will call back if she wants to change the medications, has to figure her finances out, since she had just bought the medication and would have to buy the Tramadol.

## 2024-08-20 ENCOUNTER — TELEPHONE (OUTPATIENT)
Dept: PAIN MANAGEMENT | Age: 59
End: 2024-08-20

## 2024-08-20 DIAGNOSIS — G89.4 CHRONIC PAIN SYNDROME: ICD-10-CM

## 2024-08-20 DIAGNOSIS — T84.84XS PAIN IN PROSTHETIC JOINT, SEQUELA: ICD-10-CM

## 2024-08-20 DIAGNOSIS — M54.16 LUMBAR RADICULOPATHY: ICD-10-CM

## 2024-08-20 DIAGNOSIS — M17.11 PRIMARY LOCALIZED OSTEOARTHROSIS OF RIGHT LOWER LEG: ICD-10-CM

## 2024-08-20 DIAGNOSIS — M79.7 FIBROMYALGIA: ICD-10-CM

## 2024-08-20 RX ORDER — TRAMADOL HYDROCHLORIDE 50 MG/1
50 TABLET ORAL 3 TIMES DAILY PRN
Qty: 66 TABLET | Refills: 0 | Status: SHIPPED | OUTPATIENT
Start: 2024-08-20 | End: 2024-09-11

## 2024-08-20 NOTE — TELEPHONE ENCOUNTER
[id number] [place], [place] - [address] - P [phone] - F [phone] called,stated pt filled a script on 8/14/24 for HYDROcodone-acetaminophen (NORCO) 5-325 MG per tablet and they received a new script today for traMADol (ULTRAM) 50 MG tablet.     Pharmacy wants to know if they should fill the tramadol    Please advise

## 2024-08-20 NOTE — TELEPHONE ENCOUNTER
Patient came into the office today to return Norco 5/325 mg. Patient returned 61 tablets     Medication disposal was witnessed by Shama HAIRSTON and Patient     A Rx for Tramadol 50 mg  1 tablet po TID # 66 was sent to

## 2024-08-20 NOTE — TELEPHONE ENCOUNTER
Called patient to advise her that she can bring back the hydrocodone and exchange it for Tramadol. Patient voiced her understanding.

## 2024-08-20 NOTE — TELEPHONE ENCOUNTER
Pt states the HYDROcodone-acetaminophen (NORCO) 5-325 MG per tablet is doing nothing for the pain, and making her head hurt. Pt would like to go back to the original Tramadol that [facility] was prescribing.     Please advise

## 2024-08-20 NOTE — TELEPHONE ENCOUNTER
Called [facility] and gave okay to fill Tramadol, patient brought in the Hydrocodone because it was not helping. And medications was discarded per office protocol

## 2024-09-11 ENCOUNTER — OFFICE VISIT (OUTPATIENT)
Dept: PAIN MANAGEMENT | Age: 59
End: 2024-09-11
Payer: COMMERCIAL

## 2024-09-11 VITALS
HEART RATE: 63 BPM | SYSTOLIC BLOOD PRESSURE: 130 MMHG | BODY MASS INDEX: 31.63 KG/M2 | WEIGHT: 208 LBS | DIASTOLIC BLOOD PRESSURE: 87 MMHG | OXYGEN SATURATION: 96 %

## 2024-09-11 DIAGNOSIS — T84.84XS PAIN IN PROSTHETIC JOINT, SEQUELA: ICD-10-CM

## 2024-09-11 DIAGNOSIS — M17.11 PRIMARY LOCALIZED OSTEOARTHROSIS OF RIGHT LOWER LEG: ICD-10-CM

## 2024-09-11 DIAGNOSIS — M54.16 LUMBAR RADICULOPATHY: ICD-10-CM

## 2024-09-11 DIAGNOSIS — G89.4 CHRONIC PAIN SYNDROME: ICD-10-CM

## 2024-09-11 DIAGNOSIS — M79.7 FIBROMYALGIA: ICD-10-CM

## 2024-09-11 PROCEDURE — 99213 OFFICE O/P EST LOW 20 MIN: CPT | Performed by: INTERNAL MEDICINE

## 2024-09-11 RX ORDER — TRAMADOL HYDROCHLORIDE 50 MG/1
50 TABLET ORAL 3 TIMES DAILY PRN
Qty: 105 TABLET | Refills: 0 | Status: SHIPPED | OUTPATIENT
Start: 2024-09-11 | End: 2024-10-16

## 2024-09-11 RX ORDER — TRAMADOL HYDROCHLORIDE 50 MG/1
TABLET ORAL
Qty: 66 TABLET | OUTPATIENT
Start: 2024-09-11

## 2024-09-11 RX ORDER — METHYLPREDNISOLONE 4 MG
TABLET, DOSE PACK ORAL
Qty: 1 KIT | Refills: 0 | Status: SHIPPED | OUTPATIENT
Start: 2024-09-11

## 2024-09-26 DIAGNOSIS — G89.29 CHRONIC RIGHT-SIDED LOW BACK PAIN WITHOUT SCIATICA: ICD-10-CM

## 2024-09-26 DIAGNOSIS — M54.50 CHRONIC RIGHT-SIDED LOW BACK PAIN WITHOUT SCIATICA: ICD-10-CM

## 2024-09-26 RX ORDER — TIZANIDINE 2 MG/1
2 TABLET ORAL EVERY 8 HOURS PRN
Qty: 30 TABLET | Refills: 2 | Status: SHIPPED | OUTPATIENT
Start: 2024-09-26

## 2024-10-16 ENCOUNTER — OFFICE VISIT (OUTPATIENT)
Dept: PAIN MANAGEMENT | Age: 59
End: 2024-10-16
Payer: COMMERCIAL

## 2024-10-16 VITALS
BODY MASS INDEX: 31.93 KG/M2 | WEIGHT: 210 LBS | HEART RATE: 73 BPM | SYSTOLIC BLOOD PRESSURE: 138 MMHG | OXYGEN SATURATION: 99 % | DIASTOLIC BLOOD PRESSURE: 84 MMHG

## 2024-10-16 DIAGNOSIS — M17.11 PRIMARY LOCALIZED OSTEOARTHROSIS OF RIGHT LOWER LEG: ICD-10-CM

## 2024-10-16 DIAGNOSIS — G89.4 CHRONIC PAIN SYNDROME: ICD-10-CM

## 2024-10-16 DIAGNOSIS — M79.7 FIBROMYALGIA: ICD-10-CM

## 2024-10-16 DIAGNOSIS — Z91.89 AT RISK FOR RESPIRATORY DEPRESSION DUE TO OPIOID: ICD-10-CM

## 2024-10-16 DIAGNOSIS — M54.16 LUMBAR RADICULOPATHY: ICD-10-CM

## 2024-10-16 DIAGNOSIS — T84.84XS PAIN IN PROSTHETIC JOINT, SEQUELA: ICD-10-CM

## 2024-10-16 PROCEDURE — 99213 OFFICE O/P EST LOW 20 MIN: CPT | Performed by: INTERNAL MEDICINE

## 2024-10-16 RX ORDER — TRAMADOL HYDROCHLORIDE 50 MG/1
50 TABLET ORAL 3 TIMES DAILY PRN
Qty: 105 TABLET | Refills: 0 | Status: SHIPPED | OUTPATIENT
Start: 2024-10-16 | End: 2024-11-20

## 2024-10-16 RX ORDER — LIDOCAINE 50 MG/G
1 PATCH TOPICAL DAILY
Qty: 30 PATCH | Refills: 0 | Status: SHIPPED | OUTPATIENT
Start: 2024-10-16

## 2024-10-16 NOTE — PROGRESS NOTES
against driving or handling machinery while adjusting the dose of medicines or if having cognitive  issues related to the current medications.Risk of overdose and death, if medicines not taken as prescribed, were also discussed. If the patient develops new symptoms or if the symptoms worsen, the patient should call the office.    Thank you for allowing me to participate in the care of this patient.      Cc: Sergey Heard MD

## 2024-11-20 ENCOUNTER — OFFICE VISIT (OUTPATIENT)
Dept: PAIN MANAGEMENT | Age: 59
End: 2024-11-20
Payer: COMMERCIAL

## 2024-11-20 VITALS
SYSTOLIC BLOOD PRESSURE: 139 MMHG | BODY MASS INDEX: 31.84 KG/M2 | WEIGHT: 209.4 LBS | HEART RATE: 56 BPM | DIASTOLIC BLOOD PRESSURE: 87 MMHG | OXYGEN SATURATION: 94 %

## 2024-11-20 DIAGNOSIS — M54.16 LUMBAR RADICULOPATHY: ICD-10-CM

## 2024-11-20 DIAGNOSIS — M79.7 FIBROMYALGIA: ICD-10-CM

## 2024-11-20 DIAGNOSIS — M17.11 PRIMARY LOCALIZED OSTEOARTHROSIS OF RIGHT LOWER LEG: ICD-10-CM

## 2024-11-20 DIAGNOSIS — G89.4 CHRONIC PAIN SYNDROME: ICD-10-CM

## 2024-11-20 DIAGNOSIS — T84.84XS PAIN IN PROSTHETIC JOINT, SEQUELA: ICD-10-CM

## 2024-11-20 PROCEDURE — 99213 OFFICE O/P EST LOW 20 MIN: CPT | Performed by: INTERNAL MEDICINE

## 2024-11-20 RX ORDER — TRAMADOL HYDROCHLORIDE 50 MG/1
50 TABLET ORAL 3 TIMES DAILY PRN
Qty: 90 TABLET | Refills: 0 | Status: SHIPPED | OUTPATIENT
Start: 2024-11-20 | End: 2024-12-20

## 2024-11-20 RX ORDER — DICLOFENAC SODIUM 75 MG/1
75 TABLET, DELAYED RELEASE ORAL DAILY PRN
Qty: 30 TABLET | Refills: 1 | Status: SHIPPED | OUTPATIENT
Start: 2024-11-20

## 2024-11-20 RX ORDER — GABAPENTIN 600 MG/1
600 TABLET ORAL 2 TIMES DAILY
Qty: 60 TABLET | Refills: 1 | Status: SHIPPED | OUTPATIENT
Start: 2024-11-20 | End: 2025-01-19

## 2024-11-20 NOTE — PROGRESS NOTES
Dixie Lazcano  1965  [id number]      HISTORY OF PRESENT ILLNESS:  Ms. Lazcano is a 59 y.o. female returns for a follow up visit for pain management  She has a diagnosis of   1. Chronic pain syndrome    2. Primary localized osteoarthrosis of right lower leg    3. 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 Fibromyalgia    5. Lumbar radiculopathy    6. Mood disorder (HCC)    7. Primary insomnia    .      As per information/history obtained from the PADT(patient assessment and documentation tool) -  She complains of pain in the neck, upper back, mid back, and lower back with radiation to the shoulders Bilateral, arms Bilateral, elbows Bilateral, hands Bilateral, buttocks, hips Bilateral, upper leg Bilateral, knees Bilateral, lower leg Bilateral, ankles Bilateral, and feet Bilateral She rates the pain 8/10 and describes it as sharp, aching, burning, numbness, pins and needles.  Pain is made worse by: movement, walking, standing, bending, lifting. She denies any side effects from the current pain regimen. Patient reports that since last follow up visit the physical functioning is worse, family/social relationships are unchanged, mood is unchanged sleep patterns are unchanged. Ms. Lazcano states that since starting the treatment with the current regimen the  overall functioning  in the above aspects is  worse, Patient denies misusing/abusing her narcotic pain medications or using any illegal drugs.  There are No indicators for possible drug abuse, addiction or diversion problems.  Upon obtaining the medical history from Ms. Lazcano regarding today's office visit for her presenting problems, Patient reports she has been doing fair, managing with the medications. She complains she has been hurting from waist down, states hands hurt also. 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 She mentions she's using Neurontin 1200 mg.       ALLERGIES: Patients list of allergies were reviewed     MEDICATIONS: Ms. Lazcano list of

## 2024-12-18 ENCOUNTER — OFFICE VISIT (OUTPATIENT)
Dept: PAIN MANAGEMENT | Age: 59
End: 2024-12-18
Payer: COMMERCIAL

## 2024-12-18 VITALS
OXYGEN SATURATION: 95 % | HEART RATE: 69 BPM | WEIGHT: 215 LBS | BODY MASS INDEX: 32.69 KG/M2 | SYSTOLIC BLOOD PRESSURE: 175 MMHG | DIASTOLIC BLOOD PRESSURE: 92 MMHG

## 2024-12-18 DIAGNOSIS — M54.16 LUMBAR RADICULOPATHY: ICD-10-CM

## 2024-12-18 DIAGNOSIS — G89.4 CHRONIC PAIN SYNDROME: ICD-10-CM

## 2024-12-18 DIAGNOSIS — F51.01 PRIMARY INSOMNIA: ICD-10-CM

## 2024-12-18 DIAGNOSIS — Z79.899 ENCOUNTER FOR LONG-TERM (CURRENT) USE OF HIGH-RISK MEDICATION: ICD-10-CM

## 2024-12-18 DIAGNOSIS — Z91.89 AT RISK FOR RESPIRATORY DEPRESSION DUE TO OPIOID: ICD-10-CM

## 2024-12-18 DIAGNOSIS — M17.11 PRIMARY LOCALIZED OSTEOARTHROSIS OF RIGHT LOWER LEG: ICD-10-CM

## 2024-12-18 DIAGNOSIS — M79.7 FIBROMYALGIA: ICD-10-CM

## 2024-12-18 DIAGNOSIS — F39 MOOD DISORDER (HCC): ICD-10-CM

## 2024-12-18 DIAGNOSIS — T84.84XS PAIN IN PROSTHETIC JOINT, SEQUELA: ICD-10-CM

## 2024-12-18 PROCEDURE — 99214 OFFICE O/P EST MOD 30 MIN: CPT | Performed by: INTERNAL MEDICINE

## 2024-12-18 RX ORDER — METHYLPREDNISOLONE 4 MG/1
TABLET ORAL
Qty: 1 KIT | Refills: 0 | Status: SHIPPED | OUTPATIENT
Start: 2024-12-18

## 2024-12-18 RX ORDER — GABAPENTIN 600 MG/1
600 TABLET ORAL 2 TIMES DAILY
Qty: 60 TABLET | Refills: 1 | Status: SHIPPED | OUTPATIENT
Start: 2024-12-18 | End: 2025-02-16

## 2024-12-18 RX ORDER — DICLOFENAC SODIUM 75 MG/1
75 TABLET, DELAYED RELEASE ORAL DAILY PRN
Qty: 30 TABLET | Refills: 1 | Status: SHIPPED | OUTPATIENT
Start: 2024-12-18

## 2024-12-18 RX ORDER — TRAMADOL HYDROCHLORIDE 50 MG/1
50 TABLET ORAL 3 TIMES DAILY PRN
Qty: 84 TABLET | Refills: 0 | Status: SHIPPED | OUTPATIENT
Start: 2024-12-18 | End: 2025-01-15

## 2024-12-18 NOTE — PROGRESS NOTES
Dixie Lazcano  1965  [id number]    HISTORY OF PRESENT ILLNESS:  Ms. Lazcano is a 59 y.o. female returns for a follow up visit for multiple medical problems.  Her  presenting problems are   1. Chronic pain syndrome    2. Fibromyalgia    3. Mood disorder (HCC)    4. Encounter for long-term (current) use of high-risk medication    5. Primary localized osteoarthrosis of right lower leg    6. Lumbar radiculopathy    7. Primary insomnia    8. 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 At risk for respiratory depression due to opioid    .    As per information/history obtained from the PADT(patient assessment and documentation tool) -  She complains of pain in the  generalize pain all over   She rates the pain 10/10 and describes it as sharp, aching, burning, numbness, pins and needles.  Pain is made worse by: movement, walking, standing, sitting, bending, lifting.  Current treatment regimen has helped relieve about 20% of the pain.  She denies side effects from the current pain regimen.   Patient reports that since last follow up visit the physical functioning is worse, family/social relationships are unchanged, mood is worse sleep patterns are worse.  Ms. Lazcano states that since starting the treatment with the current regimen the  overall functioning  in the above aspects is  better,Patient denies neurological bowel or bladder. Patient denies misusing/abusing her narcotic pain medications or using any illegal drugs.  There are No indicators for possible drug abuse, addiction or diversion problems.     Upon obtaining the medical history from Ms. Lazcano regarding today's office visit for her presenting problems, patient states she has been doing fair. Ms. Lazcano is complaining of pain in the back and right leg. She states she is scared of injections. Patient states she has to lift at work, she is lifting 20-30 pounds. She mentons she is using Voltaren along with Neurontin and Ultram 3 per day. Patient states her

## 2025-01-15 ENCOUNTER — OFFICE VISIT (OUTPATIENT)
Dept: PAIN MANAGEMENT | Age: 60
End: 2025-01-15
Payer: COMMERCIAL

## 2025-01-15 VITALS
BODY MASS INDEX: 32.54 KG/M2 | WEIGHT: 214 LBS | OXYGEN SATURATION: 97 % | HEART RATE: 61 BPM | SYSTOLIC BLOOD PRESSURE: 164 MMHG | DIASTOLIC BLOOD PRESSURE: 85 MMHG

## 2025-01-15 DIAGNOSIS — Z91.89 AT RISK FOR RESPIRATORY DEPRESSION DUE TO OPIOID: ICD-10-CM

## 2025-01-15 DIAGNOSIS — M17.11 PRIMARY LOCALIZED OSTEOARTHROSIS OF RIGHT LOWER LEG: ICD-10-CM

## 2025-01-15 DIAGNOSIS — G89.4 CHRONIC PAIN SYNDROME: ICD-10-CM

## 2025-01-15 DIAGNOSIS — M54.16 LUMBAR RADICULOPATHY: ICD-10-CM

## 2025-01-15 DIAGNOSIS — M79.7 FIBROMYALGIA: ICD-10-CM

## 2025-01-15 DIAGNOSIS — Z79.899 ENCOUNTER FOR LONG-TERM (CURRENT) USE OF HIGH-RISK MEDICATION: ICD-10-CM

## 2025-01-15 DIAGNOSIS — F51.01 PRIMARY INSOMNIA: ICD-10-CM

## 2025-01-15 DIAGNOSIS — T84.84XS PAIN IN PROSTHETIC JOINT, SEQUELA: ICD-10-CM

## 2025-01-15 DIAGNOSIS — F39 MOOD DISORDER (HCC): ICD-10-CM

## 2025-01-15 PROCEDURE — 99213 OFFICE O/P EST LOW 20 MIN: CPT | Performed by: INTERNAL MEDICINE

## 2025-01-15 RX ORDER — GABAPENTIN 600 MG/1
600 TABLET ORAL 2 TIMES DAILY
Qty: 60 TABLET | Refills: 1 | Status: SHIPPED | OUTPATIENT
Start: 2025-01-15 | End: 2025-03-16

## 2025-01-15 RX ORDER — TRAMADOL HYDROCHLORIDE 50 MG/1
50 TABLET ORAL 3 TIMES DAILY PRN
Qty: 84 TABLET | Refills: 0 | Status: SHIPPED | OUTPATIENT
Start: 2025-01-15 | End: 2025-02-12

## 2025-01-15 RX ORDER — DICLOFENAC SODIUM 75 MG/1
75 TABLET, DELAYED RELEASE ORAL DAILY PRN
Qty: 30 TABLET | Refills: 1 | Status: SHIPPED | OUTPATIENT
Start: 2025-01-15

## 2025-01-15 NOTE — PROGRESS NOTES
164/85   Pulse 61   Wt 97.1 kg (214 lb)   SpO2 97%   BMI 32.54 kg/m²   Constitutional: She appears well-developed and well-nourished. No acute distress.   Cardiovascular: Normal rate, regular rhythm, normal heart sounds, and does not have murmur.     Pulmonary/Chest: Effort normal. No respiratory distress. She does not have wheezes in the lung fields. She has no rales.     Musculo-Skeletal/Extremities:Gait is normal, assistive devices use: none.    She exhibits edema: none, and no tenderness.   Neurological/Psychiatric:She is alert and oriented to person, place, and time. Coordination is  normal.  Her mood isAppropriate and affect is Neutral/Euthymic(normal) .    IMPRESSION:   1. Chronic pain syndrome    2. Encounter for long-term (current) use of high-risk medication    3. Primary insomnia    4. Fibromyalgia    5. Primary localized osteoarthrosis of right lower leg    6. 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 Mood disorder (HCC)    8. Lumbar radiculopathy    9. At risk for respiratory depression due to opioid        PLAN:  Informed verbal consent was obtained.  Risks and benefits of the medications and other alternative treatments  including no treatment have been discussed with the patient. Any questions related to these were addressed. The common side effects of these medications were also explained to the patient.    -Continue with Ultram 3 per day   -She was advised to increase fluids ( 5-7  glasses of fluid daily), limit caffeine, avoid cheese products, increase dietary fiber, increase activity and exercise as tolerated and relax regularly and enjoy meals   -Xray lumbar spine not available for review   -Last UDS 5/24 -consistent  -Sarah exercises given   -Monitor BP regularly    Current Outpatient Medications   Medication Sig Dispense Refill    diclofenac (VOLTAREN) 75 MG EC tablet Take 1 tablet by mouth daily as needed for Pain 30 tablet 1    gabapentin (NEURONTIN) 600 MG tablet Take 1 tablet by mouth

## 2025-02-11 ENCOUNTER — OFFICE VISIT (OUTPATIENT)
Dept: PAIN MANAGEMENT | Age: 60
End: 2025-02-11
Payer: COMMERCIAL

## 2025-02-11 VITALS
BODY MASS INDEX: 32.57 KG/M2 | OXYGEN SATURATION: 97 % | WEIGHT: 214.2 LBS | HEART RATE: 63 BPM | SYSTOLIC BLOOD PRESSURE: 164 MMHG | DIASTOLIC BLOOD PRESSURE: 92 MMHG

## 2025-02-11 DIAGNOSIS — Z79.899 ENCOUNTER FOR LONG-TERM (CURRENT) USE OF HIGH-RISK MEDICATION: ICD-10-CM

## 2025-02-11 DIAGNOSIS — G89.4 CHRONIC PAIN SYNDROME: ICD-10-CM

## 2025-02-11 DIAGNOSIS — M17.11 PRIMARY LOCALIZED OSTEOARTHROSIS OF RIGHT LOWER LEG: ICD-10-CM

## 2025-02-11 DIAGNOSIS — T84.84XS PAIN IN PROSTHETIC JOINT, SEQUELA: ICD-10-CM

## 2025-02-11 DIAGNOSIS — M54.16 LUMBAR RADICULOPATHY: ICD-10-CM

## 2025-02-11 DIAGNOSIS — Z91.89 AT RISK FOR RESPIRATORY DEPRESSION DUE TO OPIOID: ICD-10-CM

## 2025-02-11 DIAGNOSIS — M79.7 FIBROMYALGIA: ICD-10-CM

## 2025-02-11 PROCEDURE — 99213 OFFICE O/P EST LOW 20 MIN: CPT | Performed by: INTERNAL MEDICINE

## 2025-02-11 RX ORDER — GABAPENTIN 600 MG/1
600 TABLET ORAL 2 TIMES DAILY
Qty: 60 TABLET | Refills: 1 | Status: SHIPPED | OUTPATIENT
Start: 2025-02-11 | End: 2025-04-12

## 2025-02-11 RX ORDER — TRAMADOL HYDROCHLORIDE 50 MG/1
50 TABLET ORAL 3 TIMES DAILY PRN
Qty: 84 TABLET | Refills: 0 | Status: SHIPPED | OUTPATIENT
Start: 2025-02-11 | End: 2025-03-11

## 2025-02-11 RX ORDER — DICLOFENAC SODIUM 75 MG/1
75 TABLET, DELAYED RELEASE ORAL DAILY PRN
Qty: 30 TABLET | Refills: 1 | Status: SHIPPED | OUTPATIENT
Start: 2025-02-11

## 2025-02-11 NOTE — PROGRESS NOTES
general activity, work or disability,emotional distress, health care utilization and  decreased medication consumption.   Will continue to monitor progress towards achieving/maintaining therapeutic goals with special emphasis on  1. -Improvement in perceived interfernce  of pain with ADL's. YES  -Ability to do home exercises independently. YES  -Ability to do household chores, indoor work and social and leisure activities. YES  -Improve psychosocial and physical functioning.YES  -Ability to do outside chores/ yard work YES    She was advised against drinking alcohol with the narcotic pain medicines, advised against driving or handling machinery while adjusting the dose of medicines or if having cognitive  issues related to the current medications.Risk of overdose and death, if medicines not taken as prescribed, were also discussed. If the patient develops new symptoms or if the symptoms worsen, the patient should call the office.    Thank you for allowing me to participate in the care of this patient.      Cc: Sergey Heard MD

## 2025-03-11 ENCOUNTER — OFFICE VISIT (OUTPATIENT)
Dept: PAIN MANAGEMENT | Age: 60
End: 2025-03-11
Payer: COMMERCIAL

## 2025-03-11 VITALS
WEIGHT: 213 LBS | SYSTOLIC BLOOD PRESSURE: 159 MMHG | OXYGEN SATURATION: 95 % | BODY MASS INDEX: 32.39 KG/M2 | DIASTOLIC BLOOD PRESSURE: 70 MMHG | HEART RATE: 64 BPM

## 2025-03-11 DIAGNOSIS — M54.16 LUMBAR RADICULOPATHY: ICD-10-CM

## 2025-03-11 DIAGNOSIS — G89.4 CHRONIC PAIN SYNDROME: ICD-10-CM

## 2025-03-11 DIAGNOSIS — M17.11 PRIMARY LOCALIZED OSTEOARTHROSIS OF RIGHT LOWER LEG: ICD-10-CM

## 2025-03-11 DIAGNOSIS — M79.7 FIBROMYALGIA: ICD-10-CM

## 2025-03-11 DIAGNOSIS — T84.84XS PAIN IN PROSTHETIC JOINT, SEQUELA: ICD-10-CM

## 2025-03-11 DIAGNOSIS — Z79.899 ENCOUNTER FOR LONG-TERM (CURRENT) USE OF HIGH-RISK MEDICATION: ICD-10-CM

## 2025-03-11 DIAGNOSIS — Z91.89 AT RISK FOR RESPIRATORY DEPRESSION DUE TO OPIOID: ICD-10-CM

## 2025-03-11 DIAGNOSIS — Z51.81 ENCOUNTER FOR THERAPEUTIC DRUG MONITORING: ICD-10-CM

## 2025-03-11 PROCEDURE — 99213 OFFICE O/P EST LOW 20 MIN: CPT | Performed by: INTERNAL MEDICINE

## 2025-03-11 RX ORDER — GABAPENTIN 600 MG/1
600 TABLET ORAL 2 TIMES DAILY
Qty: 60 TABLET | Refills: 1 | Status: SHIPPED | OUTPATIENT
Start: 2025-03-11 | End: 2025-05-10

## 2025-03-11 RX ORDER — TRAMADOL HYDROCHLORIDE 50 MG/1
50 TABLET ORAL 3 TIMES DAILY PRN
Qty: 84 TABLET | Refills: 0 | Status: SHIPPED | OUTPATIENT
Start: 2025-03-11 | End: 2025-04-08

## 2025-03-11 RX ORDER — DICLOFENAC SODIUM 75 MG/1
75 TABLET, DELAYED RELEASE ORAL DAILY PRN
Qty: 30 TABLET | Refills: 1 | Status: SHIPPED | OUTPATIENT
Start: 2025-03-11

## 2025-03-11 NOTE — PROGRESS NOTES
distress. She does not have wheezes in the lung fields. She has no rales.     Musculo-Skeletal/Extremities:Gait is normal, assistive devices use: none.    She exhibits edema: none, and no tenderness.   Neurological/Psychiatric:She is alert and oriented to person, place, and time. Coordination is  normal.  Her mood isAppropriate and affect is Neutral/Euthymic(normal) .    IMPRESSION:   1. Chronic pain syndrome    2. Primary localized osteoarthrosis of right lower leg    3. At risk for respiratory depression due to opioid    4. Fibromyalgia    5. 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 Encounter for long-term (current) use of high-risk medication    8. Encounter for therapeutic drug monitoring        PLAN:  Informed verbal consent was obtained.  Risks and benefits of the medications and other alternative treatments  including no treatment have been discussed with the patient. Any questions related to these were addressed. The common side effects of these medications were also explained to the patient.    -[place] record was obtained and reviewed  for the last one year and no indicators of drug misuse  were found. Any other controlled substance prescriptions  seen on the record have been accounted for, I am aware of the patient receiving these medications. [place] record will be rechecked as part of office protocol.    -Urine drug screen with GC/MS for opiates and drugs of abuse was ordered and will follow up on results.   -Last UDS was on 5/24-consistent   -She was advised to increase fluids ( 5-7  glasses of fluid daily), limit caffeine, avoid cheese products, increase dietary fiber, increase activity and exercise as tolerated and relax regularly and enjoy meals   -Continue with Ultram, 3 per day  -Walking 20 minutes daily as tolerated    Current Outpatient Medications   Medication Sig Dispense Refill    diclofenac (VOLTAREN) 75 MG EC tablet Take 1 tablet by mouth daily as needed for Pain 30

## 2025-03-16 LAB
1,3 CHLOROPHENYL PIPERAZINE, URINE: NOT DETECTED
6-MONOACETYLMORPHINE: NEGATIVE NG/ML
7-AMINOCLONAZEPAM/CREATININE URINE: NOT DETECTED NG/MG CREAT
8-HYDROXYAMOXAPINE, URINE: NOT DETECTED
8-HYDROXYLOXAPINE, URINE: NOT DETECTED
ACETAMINOPHEN, URINE: NORMAL UG/ML
ACETAMINOPHEN, URINE: PRESENT
ALPHA HYDROXYALPRAZOLAM/CREATININE URINE: NOT DETECTED NG/MG CREAT
ALPHA HYDROXYTRIAZOLAM/CREAT UR CFM: NOT DETECTED NG/MG CREAT
ALPHA-HYDROXYMIDAZOLAM, URINE: NOT DETECTED NG/MG CREAT
ALPRAZOLAM/CREATININE URINE: NOT DETECTED NG/MG CREAT
AMINO CHLOROPYRIDINE, URINE: NOT DETECTED
AMITRIPTYLINE: NOT DETECTED
AMOXAPINE, URINE: NOT DETECTED
AMPHETAMINE SCREEN URINE: NEGATIVE NG/ML
ANALGESICS SCREEN URINE: NORMAL
ANTIDEPRESSANTS SCREEN URINE: NEGATIVE
ANTIPSYCHOTICS SCREEN URINE: NEGATIVE
ARIPIPRAZOLE, URINE: NOT DETECTED
ASENAPINE, URINE: NOT DETECTED
BACLOFEN, URINE: NOT DETECTED
BARBITURATE SCREEN URINE: NEGATIVE NG/ML
BENZODIAZEPINE SCREEN, URINE: NEGATIVE
BUPRENORPHINE URINE: NEGATIVE
BUPRENORPHINE/CREATININE URINE: NOT DETECTED NG/MG CREAT
BUPROPION, URINE: NOT DETECTED
CANNABINOID SCREEN URINE: NEGATIVE NG/ML
CARBAMAZEPINE, URINE: NOT DETECTED
CARISOPRODOL, UR: NOT DETECTED
CHLORPROMAZINE, URINE: NOT DETECTED
CITALOPRAM, URINE: NOT DETECTED
CLOBAZAM, URINE: NOT DETECTED
CLOMIPRAMINE, URINE: NOT DETECTED
CLONAZEPAM/CREATININE URINE: NOT DETECTED NG/MG CREAT
CLOZAPINE, URINE: NOT DETECTED
COCAINE METABOLITE SCREEN URINE: NEGATIVE NG/ML
CREATININE URINE: 144 MG/DL
CYCLOBENZAPRINE, URINE: NOT DETECTED
DESALKYLFLURAZEPAM/CREATININE URINE: NOT DETECTED NG/MG CREAT
DESIPRAMINE: NOT DETECTED
DESMETHYLCITALOPRAM, URINE: NOT DETECTED
DESMETHYLCLOMIPRAMINE, URINE: NOT DETECTED
DESMETHYLCLOZAPINE, URINE: NOT DETECTED
DESMETHYLCYCLOBENZAPRINE, URINE: NOT DETECTED
DESMETHYLDOXEPIN, URINE: NOT DETECTED
DESMETHYLFLUNITRAZEPAM, URINE: NOT DETECTED NG/MG CREAT
DESMETHYLSERTRALINE, URINE: NOT DETECTED
DESMETHYLVENLAFAXINE, URINE: NOT DETECTED
DIAZEPAM/CREATININE URINE: NOT DETECTED NG/MG CREAT
DOXEPIN, URINE: NOT DETECTED
DULOXETINE, URINE: NOT DETECTED
EZOGABINE, URINE: NOT DETECTED
FENTANYL / ANALOGUES SCREEN URINE: NEGATIVE
FENTANYL/CREATININE URINE: NOT DETECTED NG/MG CREAT
FLUNITRAZEPAM, URINE: NOT DETECTED NG/MG CREAT
FLUOXETINE, URINE: NOT DETECTED
FLUPHENAZINE, URINE: NOT DETECTED
FLUVOXAMINE, URINE: NOT DETECTED
GABAPENTIN: PRESENT
HALOPERIDOL, URINE: NOT DETECTED
HYDROXYBUPROPION, URINE: NOT DETECTED
ILOPERIDONE, URINE: NOT DETECTED
IMIPRAMINE, URINE: NOT DETECTED
KETAMINE, URINE: NOT DETECTED
LABORATORY REPORT: NORMAL
LAMOTRIGINE, URINE: NOT DETECTED
LEVETIRACETAM, URINE: NOT DETECTED
LORAZEPAM/CREATININE URINE: NOT DETECTED NG/MG CREAT
LOXAPINE, URINE: NOT DETECTED
LURASIDONE, URINE: NOT DETECTED
MAPROTILINE, URINE: NOT DETECTED
MEPERIDINE: NEGATIVE NG/ML
MEPROBAMATE, URINE: NOT DETECTED
MESORIDAZINE, URINE: NOT DETECTED
METAXALONE, URINE: NOT DETECTED
METHADONE AND METABOLITES, URINE: NEGATIVE NG/ML
METHADONE SCREEN, URINE: NEGATIVE NG/ML
METHOCARBAMOL, URINE: NOT DETECTED
METHYLPHENIDATE, URINE: NOT DETECTED
MIDAZOLAM/CREATININE URINE: NOT DETECTED NG/MG CREAT
MIRTAZAPINE, URINE: NOT DETECTED
MOLINDONE, URINE: NOT DETECTED
MUSCLE RELAXANTS SCREEN URINE: NEGATIVE
N-NORTRAMADOL/CREAT UR CFM: >3472 NG/MG CREAT
NEFAZODONE, URINE: NOT DETECTED
NORBUPRENORPHINE/CREATININE URINE: NOT DETECTED NG/MG CREAT
NORDIAZEPAM/CREATININE URINE: NOT DETECTED NG/MG CREAT
NORFENTANYL/CREATININE URINE: NOT DETECTED NG/MG CREAT
NORFLUOXETINE, URINE: NOT DETECTED
NORKETAMINE, URINE: NOT DETECTED
NORTRIPTYLINE: NOT DETECTED
O-NORTRAMADOL, URINE: >3472 NG/MG CREAT
OLANZAPINE, URINE: NOT DETECTED
OPIATE SCREEN URINE: NEGATIVE NG/ML
ORPHENADRINE, URINE: NOT DETECTED
OTHER HALLUCINOGENS SCREEN URINE: NEGATIVE
OXAZEPAM/CREATININE URINE: NOT DETECTED NG/MG CREAT
OXCARBAZEPINE MHD, URINE: NOT DETECTED
OXYCODONE SCREEN URINE: NEGATIVE NG/ML
PAROXETINE, URINE: NOT DETECTED
PERPHENAZINE, URINE: NOT DETECTED
PHENCYCLIDINE SCREEN URINE: NEGATIVE NG/ML
PHENYTOIN, URINE: NOT DETECTED
PIMOZIDE, URINE: NOT DETECTED
PREGABALIN, URINE: NOT DETECTED
PREGABALIN: NORMAL
PRESCRIBED MEDICATIONS: NORMAL
PRIMIDONE, URINE: NOT DETECTED
PROCHLORPERAZINE, URINE: NOT DETECTED
PROPOXYPHENE: NEGATIVE NG/ML
PROTRIPTYLINE, URINE: NOT DETECTED
QUETIAPINE, URINE: NOT DETECTED
RISPERIDONE, URINE: NOT DETECTED
RITALINIC ACID, UR: NOT DETECTED
RUFINAMIDE, URINE: NOT DETECTED
SEDATIVES/HYPNOTICS SCREEN URINE: NEGATIVE
SERTRALINE, URINE: NOT DETECTED
SYMPATHOMIMETICS SCREEN URINE: NEGATIVE
TAPENTADOL SCREEN URINE: NEGATIVE NG/ML
TEMAZEPAM/CREATININE URINE: NOT DETECTED NG/MG CREAT
THIORIDAZINE, URINE: NOT DETECTED
THIOTHIXENE, URINE: NOT DETECTED
TIAGABINE, URINE: NOT DETECTED
TIZANIDINE, URINE: NOT DETECTED
TOPIRAMATE, URINE: NOT DETECTED
TRAMADOL SCREEN URINE: NORMAL NG/ML
TRAMADOL, URINE: NORMAL
TRAMADOL: >3472 NG/MG CREAT
TRAZODONE, URINE: NOT DETECTED
TRIFLUOPERAZINE, URINE: NOT DETECTED
TRIMIPRAMINE, URINE: NOT DETECTED
VENLAFAXINE, URINE: NOT DETECTED
VILAZODONE, URINE: NOT DETECTED
ZALEPLON, URINE: NOT DETECTED
ZIPRASIDONE, URINE: NOT DETECTED
ZOLPIDEM ACID, URINE: NOT DETECTED
ZOLPIDEM, URINE: NOT DETECTED
ZONISAMIDE, URINE: NOT DETECTED
ZOPICLONE/ESZOPICLONE, URINE: NOT DETECTED

## 2025-04-09 ENCOUNTER — OFFICE VISIT (OUTPATIENT)
Dept: PAIN MANAGEMENT | Age: 60
End: 2025-04-09
Payer: COMMERCIAL

## 2025-04-09 VITALS
OXYGEN SATURATION: 98 % | WEIGHT: 208 LBS | HEART RATE: 54 BPM | DIASTOLIC BLOOD PRESSURE: 84 MMHG | SYSTOLIC BLOOD PRESSURE: 175 MMHG | BODY MASS INDEX: 31.63 KG/M2

## 2025-04-09 DIAGNOSIS — J45.20 MILD INTERMITTENT ASTHMA WITHOUT COMPLICATION: ICD-10-CM

## 2025-04-09 DIAGNOSIS — T84.84XS PAIN IN PROSTHETIC JOINT, SEQUELA: ICD-10-CM

## 2025-04-09 DIAGNOSIS — M79.7 FIBROMYALGIA: ICD-10-CM

## 2025-04-09 DIAGNOSIS — M17.11 PRIMARY LOCALIZED OSTEOARTHROSIS OF RIGHT LOWER LEG: ICD-10-CM

## 2025-04-09 DIAGNOSIS — G89.4 CHRONIC PAIN SYNDROME: ICD-10-CM

## 2025-04-09 DIAGNOSIS — M54.16 LUMBAR RADICULOPATHY: ICD-10-CM

## 2025-04-09 PROCEDURE — 99213 OFFICE O/P EST LOW 20 MIN: CPT | Performed by: INTERNAL MEDICINE

## 2025-04-09 RX ORDER — LANOLIN ALCOHOL/MO/W.PET/CERES
400 CREAM (GRAM) TOPICAL NIGHTLY
Qty: 30 TABLET | Refills: 1 | Status: SHIPPED | OUTPATIENT
Start: 2025-04-09

## 2025-04-09 RX ORDER — GABAPENTIN 600 MG/1
600 TABLET ORAL 2 TIMES DAILY
Qty: 60 TABLET | Refills: 1 | Status: SHIPPED | OUTPATIENT
Start: 2025-04-09 | End: 2025-06-08

## 2025-04-09 RX ORDER — ALBUTEROL SULFATE 90 UG/1
2 INHALANT RESPIRATORY (INHALATION) EVERY 6 HOURS PRN
Qty: 54 G | Refills: 1 | Status: SHIPPED | OUTPATIENT
Start: 2025-04-09

## 2025-04-09 RX ORDER — TRAMADOL HYDROCHLORIDE 50 MG/1
50 TABLET ORAL 3 TIMES DAILY PRN
Qty: 84 TABLET | Refills: 0 | Status: SHIPPED | OUTPATIENT
Start: 2025-04-09 | End: 2025-05-07

## 2025-04-09 RX ORDER — ALBUTEROL SULFATE 90 UG/1
2 INHALANT RESPIRATORY (INHALATION) EVERY 6 HOURS PRN
Qty: 18 G | OUTPATIENT
Start: 2025-04-09

## 2025-04-09 RX ORDER — DICLOFENAC SODIUM 75 MG/1
75 TABLET, DELAYED RELEASE ORAL DAILY PRN
Qty: 30 TABLET | Refills: 1 | Status: SHIPPED | OUTPATIENT
Start: 2025-04-09

## 2025-04-09 NOTE — PROGRESS NOTES
Dixie Lazcano  1965  [id number]      HISTORY OF PRESENT ILLNESS:  Ms. Lazcano is a 59 y.o. female returns for a follow up visit for pain management  She has a diagnosis of   1. Chronic pain syndrome    2. Fibromyalgia    3. Mood disorder    4. Primary localized osteoarthrosis of right lower leg    5. 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 Primary insomnia    .      As per information/history obtained from the PADT(patient assessment and documentation tool) -  She complains of pain in the  generalize pain all over   She rates the pain 8/10 and describes it as sharp, aching, burning, numbness, pins and needles.  Pain is made worse by: walking, standing, sitting, bending, lifting. She has anxiety any side effects from the current pain regimen. Patient reports that since last follow up visit the physical functioning is worse, family/social relationships are unchanged, mood is unchanged sleep patterns are worse. Ms. Lazcano states that since starting the treatment with the current regimen the  overall functioning  in the above aspects is  better, Patient denies misusing/abusing her narcotic pain medications or using any illegal drugs.  There are No indicators for possible drug abuse, addiction or diversion problems.   Upon obtaining the medical history from Ms. Lazcano regarding today's office visit for her presenting problems, patient states she has been doing fair. Ms. Lazcano BP is high, she is not on medications, she states it runs okay at her PCP. She mentions she is using Ultram along with Neurontin and Voltaren. Patient says she is having a lot of work stressors.       ALLERGIES: Patients list of allergies were reviewed     MEDICATIONS: Ms. Lazcano list of medications were reviewed.Her current medications are   Outpatient Medications Prior to Visit   Medication Sig Dispense Refill    Tens Unit MISC by Does not apply route Use as directed. 1 each 0    naloxone 4 MG/0.1ML LIQD

## 2025-04-09 NOTE — TELEPHONE ENCOUNTER
Last OV:  June 2024.       All recent BP's > 140/90      When pt makes an appt, please let me know.  Will send Rx for 30 days until she comes in.

## 2025-04-15 ENCOUNTER — OFFICE VISIT (OUTPATIENT)
Dept: PRIMARY CARE CLINIC | Age: 60
End: 2025-04-15
Payer: COMMERCIAL

## 2025-04-15 VITALS
DIASTOLIC BLOOD PRESSURE: 74 MMHG | WEIGHT: 213 LBS | RESPIRATION RATE: 18 BRPM | HEART RATE: 71 BPM | BODY MASS INDEX: 32.39 KG/M2 | OXYGEN SATURATION: 98 % | SYSTOLIC BLOOD PRESSURE: 141 MMHG

## 2025-04-15 DIAGNOSIS — J45.20 MILD INTERMITTENT ASTHMA WITHOUT COMPLICATION: ICD-10-CM

## 2025-04-15 DIAGNOSIS — M79.7 FIBROMYALGIA: ICD-10-CM

## 2025-04-15 DIAGNOSIS — F41.9 ANXIETY: ICD-10-CM

## 2025-04-15 DIAGNOSIS — R73.03 PREDIABETES: ICD-10-CM

## 2025-04-15 DIAGNOSIS — F39 MOOD DISORDER: ICD-10-CM

## 2025-04-15 DIAGNOSIS — Z00.00 PREVENTATIVE HEALTH CARE: Primary | ICD-10-CM

## 2025-04-15 DIAGNOSIS — G89.4 CHRONIC PAIN SYNDROME: ICD-10-CM

## 2025-04-15 DIAGNOSIS — I10 ESSENTIAL HYPERTENSION: ICD-10-CM

## 2025-04-15 DIAGNOSIS — E78.2 MIXED HYPERLIPIDEMIA: ICD-10-CM

## 2025-04-15 PROCEDURE — 3078F DIAST BP <80 MM HG: CPT | Performed by: FAMILY MEDICINE

## 2025-04-15 PROCEDURE — 3077F SYST BP >= 140 MM HG: CPT | Performed by: FAMILY MEDICINE

## 2025-04-15 PROCEDURE — 99214 OFFICE O/P EST MOD 30 MIN: CPT | Performed by: FAMILY MEDICINE

## 2025-04-15 RX ORDER — AMLODIPINE BESYLATE 5 MG/1
5 TABLET ORAL DAILY
Qty: 90 TABLET | Refills: 1 | Status: SHIPPED | OUTPATIENT
Start: 2025-04-15

## 2025-04-15 ASSESSMENT — ENCOUNTER SYMPTOMS
CONSTIPATION: 0
SHORTNESS OF BREATH: 0
BLOOD IN STOOL: 0
DIARRHEA: 0
ABDOMINAL PAIN: 0

## 2025-04-15 ASSESSMENT — PATIENT HEALTH QUESTIONNAIRE - PHQ9
SUM OF ALL RESPONSES TO PHQ QUESTIONS 1-9: 2
SUM OF ALL RESPONSES TO PHQ QUESTIONS 1-9: 2
1. LITTLE INTEREST OR PLEASURE IN DOING THINGS: SEVERAL DAYS
2. FEELING DOWN, DEPRESSED OR HOPELESS: SEVERAL DAYS
SUM OF ALL RESPONSES TO PHQ QUESTIONS 1-9: 2
SUM OF ALL RESPONSES TO PHQ QUESTIONS 1-9: 2

## 2025-04-15 NOTE — PROGRESS NOTES
inhaler Inhale 2 puffs into the lungs every 6 hours as needed for Wheezing  Sergey Gonzáles MD   Tens Unit MIS by Does not apply route Use as directed.  Dennis Espitia MD   naloxone 4 MG/0.1ML LIQD nasal spray 1 spray by Nasal route as needed for Opioid Reversal  Dennis Espitia MD   tiZANidine (ZANAFLEX) 2 MG tablet Take 1 tablet by mouth every 8 hours as needed (muscle pain)  Sergey Gonzáles MD   mometasone-formoterol (DULERA) 200-5 MCG/ACT inhaler Inhale 2 puffs into the lungs 2 times daily  Sergey Gonzáles MD   albuterol (PROVENTIL) (2.5 MG/3ML) 0.083% nebulizer solution Take 3 mLs by nebulization every 6 hours as needed for Wheezing  Sergey Gonzáles MD   Respiratory Therapy Supplies (NEBULIZER/TUBING/MOUTHPIECE) KIT 1 kit by Does not apply route every 6 hours as needed (shortness of breath/ wheezing)  Sergey Gonzáles MD   Fluticasone Propionate (FLONASE NA) by Nasal route   ProviderDomingo MD   melatonin 3 MG TABS tablet Take 1 tablet by mouth nightly  Provider, MD Domingo        Social History     Tobacco Use    Smoking status: Never     Passive exposure: Never    Smokeless tobacco: Never   Substance Use Topics    Alcohol use: No     Alcohol/week: 0.0 standard drinks of alcohol        Vitals:    04/15/25 0903   BP: (!) 151/80   Pulse: 71   Resp: 18   SpO2: 98%   Weight: 96.6 kg (213 lb)     Estimated body mass index is 32.39 kg/m² as calculated from the following:    Height as of 3/1/23: 1.727 m (5' 8\").    Weight as of this encounter: 96.6 kg (213 lb).    Physical Exam  Constitutional:       General: She is not in acute distress.     Appearance: She is well-developed.   HENT:      Head: Normocephalic.   Eyes:      Conjunctiva/sclera: Conjunctivae normal.   Neck:      Thyroid: No thyromegaly.   Cardiovascular:      Rate and Rhythm: Normal rate and regular rhythm.      Heart sounds: Normal heart sounds. No murmur heard.  Pulmonary:      Effort: No respiratory distress.      Breath sounds:

## 2025-04-17 ENCOUNTER — TELEPHONE (OUTPATIENT)
Dept: ADMINISTRATIVE | Age: 60
End: 2025-04-17

## 2025-04-17 NOTE — TELEPHONE ENCOUNTER
Submitted PA for Rybelsus 7MG tablets  Via CMM Key: [id number] STATUS: PENDING.    Follow up done daily; if no decision with in three days we will refax.  If another three days goes by with no decision will call the insurance for status.

## 2025-04-24 NOTE — TELEPHONE ENCOUNTER
The medication was DENIED; DENIAL letter is uploaded to [employer].    Generic Denial: Drug is not covered for off label usage.  This drug is FDA approved for :type 2 diabetes mellitus      Note :        If you want an APPEAL; please note in this encounter what new information you would like to APPEAL with.  Once complete route back to PA POOL.    If this requires a response please respond to the pool ( P [facility] MEDICATION PRE-AUTH).      Thank you please advise patient.

## 2025-05-07 ENCOUNTER — OFFICE VISIT (OUTPATIENT)
Dept: PAIN MANAGEMENT | Age: 60
End: 2025-05-07
Payer: COMMERCIAL

## 2025-05-07 VITALS
HEART RATE: 68 BPM | DIASTOLIC BLOOD PRESSURE: 89 MMHG | SYSTOLIC BLOOD PRESSURE: 168 MMHG | WEIGHT: 213 LBS | BODY MASS INDEX: 32.39 KG/M2 | OXYGEN SATURATION: 96 %

## 2025-05-07 DIAGNOSIS — Z79.899 ENCOUNTER FOR LONG-TERM (CURRENT) USE OF HIGH-RISK MEDICATION: ICD-10-CM

## 2025-05-07 DIAGNOSIS — T84.84XS PAIN IN PROSTHETIC JOINT, SEQUELA: ICD-10-CM

## 2025-05-07 DIAGNOSIS — Z51.81 ENCOUNTER FOR THERAPEUTIC DRUG MONITORING: ICD-10-CM

## 2025-05-07 DIAGNOSIS — Z91.89 AT RISK FOR RESPIRATORY DEPRESSION DUE TO OPIOID: ICD-10-CM

## 2025-05-07 DIAGNOSIS — G89.4 CHRONIC PAIN SYNDROME: ICD-10-CM

## 2025-05-07 DIAGNOSIS — M17.11 PRIMARY LOCALIZED OSTEOARTHROSIS OF RIGHT LOWER LEG: ICD-10-CM

## 2025-05-07 DIAGNOSIS — M79.7 FIBROMYALGIA: ICD-10-CM

## 2025-05-07 DIAGNOSIS — M54.16 LUMBAR RADICULOPATHY: ICD-10-CM

## 2025-05-07 PROCEDURE — 99213 OFFICE O/P EST LOW 20 MIN: CPT | Performed by: INTERNAL MEDICINE

## 2025-05-07 RX ORDER — TRAMADOL HYDROCHLORIDE 50 MG/1
50 TABLET ORAL 3 TIMES DAILY PRN
Qty: 84 TABLET | Refills: 0 | Status: SHIPPED | OUTPATIENT
Start: 2025-05-07 | End: 2025-06-04

## 2025-05-07 NOTE — PROGRESS NOTES
Dixie Lazcano  1965  [id number]      HISTORY OF PRESENT ILLNESS:  Ms. Lazcano is a 59 y.o. female returns for a follow up visit for pain management  She has a diagnosis of   1. Chronic pain syndrome    2. At risk for respiratory depression due to opioid    3. Encounter for long-term (current) use of high-risk medication    4. Primary localized osteoarthrosis of right lower leg    5. Encounter for therapeutic drug monitoring    6. Lumbar radiculopathy    7. 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 Primary insomnia    9. Fibromyalgia    10. Mood disorder    .      As per information/history obtained from the PADT(patient assessment and documentation tool) -  She complains of generalize pain all overShe rates the pain 8/10 and describes it as sharp, aching, burning, numbness, pins and needles.  Pain is made worse by: movement, walking, standing, sitting, bending, lifting. She denies any side effects from the current pain regimen. Patient reports that since last follow up visit the physical functioning is worse, family/social relationships are unchanged, mood is worse sleep patterns are worse. Ms. Lazcano states that since starting the treatment with the current regimen the  overall functioning  in the above aspects is  better, Patient denies misusing/abusing her narcotic pain medications or using any illegal drugs.  There are No indicators for possible drug abuse, addiction or diversion problems.   Upon obtaining the medical history from Ms. Lazcano regarding today's office visit for her presenting problems, patient states her body has been aching a lot. She complains of increased pain with changes in weather. Extreme temperatures- rain, cold and damp weather causes increased pain.  She mentions she is working full time. She says she's is thinking of filing disability. She mentions she's using Ultram still 3 x per day.  BP is high, she says she was started on new medications. She states she's lives on

## 2025-06-04 ENCOUNTER — OFFICE VISIT (OUTPATIENT)
Dept: PAIN MANAGEMENT | Age: 60
End: 2025-06-04
Payer: COMMERCIAL

## 2025-06-04 VITALS
HEART RATE: 58 BPM | BODY MASS INDEX: 32.08 KG/M2 | DIASTOLIC BLOOD PRESSURE: 89 MMHG | OXYGEN SATURATION: 95 % | SYSTOLIC BLOOD PRESSURE: 138 MMHG | WEIGHT: 211 LBS

## 2025-06-04 DIAGNOSIS — M54.16 LUMBAR RADICULOPATHY: ICD-10-CM

## 2025-06-04 DIAGNOSIS — G89.4 CHRONIC PAIN SYNDROME: ICD-10-CM

## 2025-06-04 DIAGNOSIS — T84.84XS PAIN IN PROSTHETIC JOINT, SEQUELA: ICD-10-CM

## 2025-06-04 DIAGNOSIS — M79.7 FIBROMYALGIA: ICD-10-CM

## 2025-06-04 DIAGNOSIS — M17.11 PRIMARY LOCALIZED OSTEOARTHROSIS OF RIGHT LOWER LEG: ICD-10-CM

## 2025-06-04 PROCEDURE — 99213 OFFICE O/P EST LOW 20 MIN: CPT | Performed by: INTERNAL MEDICINE

## 2025-06-04 NOTE — PROGRESS NOTES
mouth daily 90 tablet 1    Semaglutide 7 MG TABS Take 7 mg by mouth daily 30 tablet 5    diclofenac (VOLTAREN) 75 MG EC tablet Take 1 tablet by mouth daily as needed for Pain 30 tablet 1    gabapentin (NEURONTIN) 600 MG tablet Take 1 tablet by mouth 2 times daily for 60 days. 60 tablet 1    magnesium oxide (MGO) 400 (240 Mg) MG tablet Take 1 tablet by mouth nightly 30 tablet 1    albuterol sulfate HFA (VENTOLIN HFA) 108 (90 Base) MCG/ACT inhaler Inhale 2 puffs into the lungs every 6 hours as needed for Wheezing 54 g 1    Tens Unit MISC by Does not apply route Use as directed. 1 each 0    naloxone 4 MG/0.1ML LIQD nasal spray 1 spray by Nasal route as needed for Opioid Reversal 1 each 0    tiZANidine (ZANAFLEX) 2 MG tablet Take 1 tablet by mouth every 8 hours as needed (muscle pain) 30 tablet 2    mometasone-formoterol (DULERA) 200-5 MCG/ACT inhaler Inhale 2 puffs into the lungs 2 times daily 1 each 3    albuterol (PROVENTIL) (2.5 MG/3ML) 0.083% nebulizer solution Take 3 mLs by nebulization every 6 hours as needed for Wheezing 120 each 3    Respiratory Therapy Supplies (NEBULIZER/TUBING/MOUTHPIECE) KIT 1 kit by Does not apply route every 6 hours as needed (shortness of breath/ wheezing) 1 kit 0    Fluticasone Propionate (FLONASE NA) by Nasal route       melatonin 3 MG TABS tablet Take 1 tablet by mouth nightly       No current facility-administered medications for this visit.       General Goals of current treatment regimen include improvement in pain, restoration of functioning- with focus on improvement in physical performance, general activity, work or disability,emotional distress, health care utilization and  decreased medication consumption.   Will continue to monitor progress towards achieving/maintaining therapeutic goals with special emphasis on  1. -Improvement in perceived interfernce  of pain with ADL's. YES  -Ability to do home exercises independently. YES  -Ability to do household chores, indoor work and

## 2025-06-05 RX ORDER — TRAMADOL HYDROCHLORIDE 50 MG/1
50 TABLET ORAL 3 TIMES DAILY PRN
Qty: 84 TABLET | Refills: 0 | Status: SHIPPED | OUTPATIENT
Start: 2025-06-05 | End: 2025-07-03

## 2025-06-05 RX ORDER — GABAPENTIN 600 MG/1
600 TABLET ORAL 2 TIMES DAILY
Qty: 60 TABLET | Refills: 1 | Status: SHIPPED | OUTPATIENT
Start: 2025-06-05 | End: 2025-08-04

## 2025-06-05 RX ORDER — DICLOFENAC SODIUM 75 MG/1
75 TABLET, DELAYED RELEASE ORAL DAILY PRN
Qty: 30 TABLET | Refills: 1 | Status: SHIPPED | OUTPATIENT
Start: 2025-06-05

## 2025-06-05 RX ORDER — LANOLIN ALCOHOL/MO/W.PET/CERES
400 CREAM (GRAM) TOPICAL NIGHTLY
Qty: 30 TABLET | Refills: 1 | Status: SHIPPED | OUTPATIENT
Start: 2025-06-05

## 2025-07-02 ENCOUNTER — OFFICE VISIT (OUTPATIENT)
Dept: PAIN MANAGEMENT | Age: 60
End: 2025-07-02
Payer: COMMERCIAL

## 2025-07-02 VITALS
WEIGHT: 212 LBS | DIASTOLIC BLOOD PRESSURE: 76 MMHG | HEART RATE: 69 BPM | BODY MASS INDEX: 32.23 KG/M2 | SYSTOLIC BLOOD PRESSURE: 138 MMHG

## 2025-07-02 DIAGNOSIS — M54.16 LUMBAR RADICULOPATHY: ICD-10-CM

## 2025-07-02 DIAGNOSIS — T84.84XS PAIN IN PROSTHETIC JOINT, SEQUELA: ICD-10-CM

## 2025-07-02 DIAGNOSIS — M17.11 PRIMARY LOCALIZED OSTEOARTHROSIS OF RIGHT LOWER LEG: ICD-10-CM

## 2025-07-02 DIAGNOSIS — M79.7 FIBROMYALGIA: ICD-10-CM

## 2025-07-02 DIAGNOSIS — G89.4 CHRONIC PAIN SYNDROME: ICD-10-CM

## 2025-07-02 PROCEDURE — 99213 OFFICE O/P EST LOW 20 MIN: CPT | Performed by: INTERNAL MEDICINE

## 2025-07-02 RX ORDER — TRAMADOL HYDROCHLORIDE 50 MG/1
50 TABLET ORAL 3 TIMES DAILY PRN
Qty: 90 TABLET | Refills: 0 | Status: SHIPPED | OUTPATIENT
Start: 2025-07-02 | End: 2025-08-01

## 2025-07-02 RX ORDER — GABAPENTIN 600 MG/1
600 TABLET ORAL 2 TIMES DAILY
Qty: 60 TABLET | Refills: 1 | Status: SHIPPED | OUTPATIENT
Start: 2025-07-02 | End: 2025-08-31

## 2025-07-02 RX ORDER — DICLOFENAC SODIUM 75 MG/1
75 TABLET, DELAYED RELEASE ORAL DAILY PRN
Qty: 30 TABLET | Refills: 1 | Status: SHIPPED | OUTPATIENT
Start: 2025-07-02

## 2025-07-02 NOTE — PROGRESS NOTES
Dixie Lazcano  1965  [id number]      HISTORY OF PRESENT ILLNESS:  Ms. Lazcano is a 59 y.o. female returns for a follow up visit for pain management  She has a diagnosis of   1. Chronic pain syndrome    2. Fibromyalgia    3. Primary insomnia    4. Lumbar radiculopathy    5. 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 Primary localized osteoarthrosis of right lower leg    .      As per information/history obtained from the PADT(patient assessment and documentation tool) -  She complains of pain in the generalize pain all over She rates the pain 8/10 and describes it as sharp, aching, burning, numbness, pins and needles.  Pain is made worse by: nothing, movement, walking, standing, sitting, bending, lifting. She denies any side effects from the current pain regimen. Patient reports that since last follow up visit the physical functioning is worse, family/social relationships are unchanged, mood is worse sleep patterns are worse. Ms. Lazcano states that since starting the treatment with the current regimen the  overall functioning  in the above aspects is  better, Patient denies misusing/abusing her narcotic pain medications or using any illegal drugs.  There are No indicators for possible drug abuse, addiction or diversion problems.   Upon obtaining the medical history from Ms. Lazcano regarding today's office visit for her presenting problems, patient states she has been doing fair, she states she is doing better than last time. Ms. Lazcano mentions she is using Ultram along with her non opioid regimen. She reports she is working full time. Patient states she can't move if she skips her medications. She states she lives on her own.       ALLERGIES: Patients list of allergies were reviewed     MEDICATIONS: Ms. Lazcano list of medications were reviewed.Her current medications are   Outpatient Medications Prior to Visit   Medication Sig Dispense Refill    diclofenac (VOLTAREN) 75 MG EC tablet Take

## 2025-07-30 ENCOUNTER — OFFICE VISIT (OUTPATIENT)
Dept: PAIN MANAGEMENT | Age: 60
End: 2025-07-30
Payer: COMMERCIAL

## 2025-07-30 VITALS
OXYGEN SATURATION: 97 % | DIASTOLIC BLOOD PRESSURE: 84 MMHG | HEART RATE: 64 BPM | SYSTOLIC BLOOD PRESSURE: 157 MMHG | WEIGHT: 213 LBS | BODY MASS INDEX: 32.39 KG/M2

## 2025-07-30 DIAGNOSIS — T84.84XS PAIN IN PROSTHETIC JOINT, SEQUELA: ICD-10-CM

## 2025-07-30 DIAGNOSIS — M79.7 FIBROMYALGIA: ICD-10-CM

## 2025-07-30 DIAGNOSIS — M54.16 LUMBAR RADICULOPATHY: ICD-10-CM

## 2025-07-30 DIAGNOSIS — G89.4 CHRONIC PAIN SYNDROME: ICD-10-CM

## 2025-07-30 DIAGNOSIS — Z91.89 AT RISK FOR RESPIRATORY DEPRESSION DUE TO OPIOID: ICD-10-CM

## 2025-07-30 DIAGNOSIS — M17.11 PRIMARY LOCALIZED OSTEOARTHROSIS OF RIGHT LOWER LEG: ICD-10-CM

## 2025-07-30 PROCEDURE — 99213 OFFICE O/P EST LOW 20 MIN: CPT | Performed by: INTERNAL MEDICINE

## 2025-07-30 RX ORDER — GABAPENTIN 600 MG/1
600 TABLET ORAL 2 TIMES DAILY
Qty: 60 TABLET | Refills: 1 | Status: SHIPPED | OUTPATIENT
Start: 2025-07-30 | End: 2025-09-28

## 2025-07-30 RX ORDER — LANOLIN ALCOHOL/MO/W.PET/CERES
400 CREAM (GRAM) TOPICAL NIGHTLY
Qty: 30 TABLET | Refills: 1 | Status: SHIPPED | OUTPATIENT
Start: 2025-07-30

## 2025-07-30 RX ORDER — TRAMADOL HYDROCHLORIDE 50 MG/1
50 TABLET ORAL EVERY 6 HOURS PRN
Qty: 105 TABLET | Refills: 0 | Status: SHIPPED | OUTPATIENT
Start: 2025-07-30 | End: 2025-09-03

## 2025-07-30 RX ORDER — DICLOFENAC SODIUM 75 MG/1
75 TABLET, DELAYED RELEASE ORAL DAILY PRN
Qty: 30 TABLET | Refills: 1 | Status: SHIPPED | OUTPATIENT
Start: 2025-07-30